# Patient Record
Sex: MALE | Race: OTHER | Employment: OTHER | URBAN - METROPOLITAN AREA
[De-identification: names, ages, dates, MRNs, and addresses within clinical notes are randomized per-mention and may not be internally consistent; named-entity substitution may affect disease eponyms.]

---

## 2017-01-15 RX ORDER — AMPICILLIN TRIHYDRATE 500 MG
CAPSULE ORAL
Qty: 30 CAP | Refills: 5 | Status: SHIPPED | OUTPATIENT
Start: 2017-01-15 | End: 2017-07-28 | Stop reason: SDUPTHER

## 2017-02-13 ENCOUNTER — OFFICE VISIT (OUTPATIENT)
Dept: INTERNAL MEDICINE CLINIC | Age: 77
End: 2017-02-13

## 2017-02-13 VITALS
HEIGHT: 64 IN | OXYGEN SATURATION: 97 % | SYSTOLIC BLOOD PRESSURE: 155 MMHG | RESPIRATION RATE: 20 BRPM | HEART RATE: 74 BPM | TEMPERATURE: 96.8 F | BODY MASS INDEX: 26.43 KG/M2 | WEIGHT: 154.8 LBS | DIASTOLIC BLOOD PRESSURE: 68 MMHG

## 2017-02-13 DIAGNOSIS — G57.90 NEUROPATHY OF FOOT, UNSPECIFIED LATERALITY: Primary | ICD-10-CM

## 2017-02-13 DIAGNOSIS — E11.9 TYPE 2 DIABETES MELLITUS WITHOUT COMPLICATION, WITHOUT LONG-TERM CURRENT USE OF INSULIN (HCC): ICD-10-CM

## 2017-02-13 DIAGNOSIS — E78.00 PURE HYPERCHOLESTEROLEMIA: ICD-10-CM

## 2017-02-13 DIAGNOSIS — I10 ESSENTIAL HYPERTENSION: ICD-10-CM

## 2017-02-13 NOTE — PROGRESS NOTES
Health Maintenance Due   Topic Date Due    DTaP/Tdap/Td series (1 - Tdap) 01/07/1960    ZOSTER VACCINE AGE 60>  01/07/1999    Pneumococcal 65+ Low/Medium Risk (2 of 2 - PCV13) 10/29/2015    EYE EXAM RETINAL OR DILATED Q1  02/14/2016    GLAUCOMA SCREENING Q2Y  02/14/2017       Chief Complaint   Patient presents with    Follow-up     4 month    Hypertension    Diabetes    Cholesterol Problem       1. Have you been to the ER, urgent care clinic since your last visit? Hospitalized since your last visit? No    2. Have you seen or consulted any other health care providers outside of the [address] [place] [address] since your last visit? Include any pap smears or colon screening. No    3) Do you have an Advance Directive on file? no    4) Are you interested in receiving information on Advance Directives? NO      Patient is accompanied by self I have received verbal consent from [facility] [place]  to discuss any/all medical information while they are present in the room.

## 2017-02-13 NOTE — MR AVS SNAPSHOT
Visit Information Date & Time Provider Department Dept. Phone Encounter #  
 2/13/2017 10:00 AM Nabeel Casillas, [address] [facility] Internal Medicine [id number] Upcoming Health Maintenance Date Due DTaP/Tdap/Td series (1 - Tdap) 1/7/1960 ZOSTER VACCINE AGE 60> 1/7/1999 Pneumococcal 65+ Low/Medium Risk (2 of 2 - PCV13) 10/29/2015 EYE EXAM RETINAL OR DILATED Q1 2/14/2016 GLAUCOMA SCREENING Q2Y 2/14/2017 HEMOGLOBIN A1C Q6M 4/10/2017 MEDICARE YEARLY EXAM 5/3/2017 MICROALBUMIN Q1 10/10/2017 LIPID PANEL Q1 10/10/2017 FOOT EXAM Q1 1/18/2018 Allergies as of 2/13/2017  Review Complete On: 2/13/2017 By: Nabeel Casillas MD  
  
 Severity Noted Reaction Type Reactions Amoxicillin  02/23/2012   Systemic Nausea and Vomiting Hydrocod-cpm-pe-acetaminophen  03/19/2010    Other (comments) GI Upset Seafood [Shellfish Containing Products]  04/20/2012   Topical Rash Current Immunizations  Reviewed on 11/20/2014 Name Date Influenza Vaccine 11/18/2014,  Incomplete Influenza Vaccine Split 11/2/2012, 9/27/2011, 10/14/2010 Pneumococcal Polysaccharide (PPSV-23) 10/29/2014 Not reviewed this visit You Were Diagnosed With   
  
 Codes Comments Neuropathy of foot, unspecified laterality    -  Primary ICD-10-CM: G57.90 ICD-9-CM: 355.8 Essential hypertension     ICD-10-CM: I10 
ICD-9-CM: 401.9 Type 2 diabetes mellitus without complication, without long-term current use of insulin (HCC)     ICD-10-CM: E11.9 ICD-9-CM: 250.00 Pure hypercholesterolemia     ICD-10-CM: E78.00 ICD-9-CM: 272.0 Vitals BP Pulse Temp Resp Height(growth percentile) Weight(growth percentile) 155/68 (BP 1 Location: Left arm, BP Patient Position: Sitting) 74 96.8 °F (36 °C) (Oral) 20 5' 4\" (1.626 m) 154 lb 12.8 oz (70.2 kg) SpO2 BMI Smoking Status 97% 26.57 kg/m2 Former Smoker Vitals History BMI and BSA Data Body Mass Index Body Surface Area  
 26.57 kg/m 2 1.78 m 2 Preferred Pharmacy Pharmacy Name Phone [id number] [phone] Your Updated Medication List  
  
   
This list is accurate as of: 2/13/17 10:22 AM.  Always use your most recent med list.  
  
  
  
  
 acetaminophen 500 mg tablet Commonly known as:  TYLENOL Take  by mouth every six (6) hours as needed. aspirin 81 mg chewable tablet Take 1 Tab by mouth every other day. clobetasol 0.05 % ointment Commonly known as:  Caryle Killer Apply  to affected area two (2) times a day. fluticasone 50 mcg/actuation nasal spray Commonly known as:  Cathlyn Bryon INHALE 1 SPRAY EACH NOSTRIL AT BEDTIME  
  
 glipiZIDE SR 2.5 mg CR tablet Commonly known as:  GLUCOTROL XL  
TAKE 1 TABLET BY MOUTH EVERY DAY  
  
 hydrocortisone 2.5 % lotion Commonly known as:  HYTONE  
APPLY A THIN LAYER TO AFFECTED AREA TWICE DAILY Iron 325 mg (65 mg iron) tablet Generic drug:  ferrous sulfate TAKE 1 TABLET BY MOUTH DAILY (BEFORE BREAKFAST). lisinopril 10 mg tablet Commonly known as:  PRINIVIL, ZESTRIL  
TAKE 1 TABLET BY MOUTH DAILY DISCONTINUE HCTZ  
  
 meloxicam 7.5 mg tablet Commonly known as:  MOBIC Take 1 Tab by mouth daily. OTHER  
500 mg. [facility] pain relief SENNA PLUS 8.6-50 mg per tablet Generic drug:  senna-docusate TAKE 1 TAB BY MOUTH DAILY AS NEEDED FOR CONSTIPATION. simvastatin 10 mg tablet Commonly known as:  ZOCOR  
TAKE 1 TABLET BY MOUTH AT BEDTIME  
  
 * JANUMET XR 50-1,000 mg Tm24 Generic drug:  SITagliptin-metFORMIN  
TAKE 2 TABLETS BY MOUTH EVERY DAY  
  
 * SITagliptin-metFORMIN 50-1,000 mg Tm24 Commonly known as:  JANUMET XR  
TAKE 2 TABLETS BY MOUTH EVERY DAY  
  
 SYSTANE (PROPYLENE GLYCOL) 0.4-0.3 % Drop Generic drug:  peg 400-propylene glycol Administer 1 Drop to left eye as needed. tamsulosin 0.4 mg capsule Commonly known as:  FLOMAX TAKE ONE CAPSULE BY MOUTH EVERY DAY  
  
 traMADol 50 mg tablet Commonly known as:  ULTRAM  
TAKE 1 TABLET BY MOUTH EVERY DAY AS NEEDED FOR PAIN  
  
 triamcinolone 55 mcg nasal inhaler Commonly known as:  NASACORT AQ  
1 [place] by Both Nostrils route nightly. VITAMIN D3 1,000 unit Cap Generic drug:  cholecalciferol TAKE ONE CAPSULE BY MOUTH EVERY DAY * VOLTAREN 1 % Gel Generic drug:  diclofenac APPLY 2 G TO AFFECTED AREA TWO (2) TIMES A DAY. * diclofenac 1 % Gel Commonly known as:  VOLTAREN Apply 2 g to affected area two (2) times a day. * Notice: This list has 4 medication(s) that are the same as other medications prescribed for you. Read the directions carefully, and ask your doctor or other care provider to review them with you. We Performed the Following HEMOGLOBIN A1C WITH EAG [49492 CPT(R)] METABOLIC PANEL, COMPREHENSIVE [26290 CPT(R)] Introducing \[facility]\"" & [facility] SERVICES! Dear Sudhir Valente: 
Thank you for requesting a [employer] account. Our records indicate that you have previously registered for a [employer] account but its currently inactive. Please call our [employer] support line at [phone]. Additional Information If you have questions, please visit the Frequently Asked Questions section of the [employer] website at [email or web address] Remember, [employer] is NOT to be used for urgent needs. For medical emergencies, dial 911. Now available from your iPhone and Android! Please provide this summary of care documentation to your next provider. Your primary care clinician is listed as Kristina Plascencia. If you have any questions after today's visit, please call [phone].

## 2017-02-13 NOTE — LETTER
2/15/2017 8:50 AM 
 
Mr. Stacey Ariza 
[address] [place] [address] [address] Dear Stacey Ariza: 
 
Please find your most recent results below. Resulted Orders METABOLIC PANEL, COMPREHENSIVE Result Value Ref Range Glucose 162 (H) 65 - 99 mg/dL BUN 16 8 - 27 mg/dL Creatinine 0.86 0.76 - 1.27 mg/dL GFR est non-AA 83 >59 mL/min/1.73 GFR est AA 96 >59 mL/min/1.73  
 BUN/Creatinine ratio 19 10 - 22 Sodium 144 134 - 144 mmol/L Potassium 4.1 3.5 - 5.2 mmol/L Chloride 101 96 - 106 mmol/L  
 CO2 28 18 - 29 mmol/L Calcium 9.4 8.6 - 10.2 mg/dL Protein, total 6.9 6.0 - 8.5 g/dL Albumin 4.3 3.5 - 4.8 g/dL GLOBULIN, TOTAL 2.6 1.5 - 4.5 g/dL A-G Ratio 1.7 1.1 - 2.5 Comment: **Effective March 13, 2017 the reference interval** 
  for A/G Ratio will be changing to: Age                Male          Female 0 -  7 days       1.1 - 2.3       1.1 - 2.3 
          8 - 30 days       1.2 - 2.8       1.2 - 2.8 
          1 -  6 months     1.3 - 3.6       1.3 - 3.6 
   7 months -  5 years      1.5 - 2.6       1.5 - 2.6 
             > 5 years      1.2 - 2.2       1.2 - 2.2 Bilirubin, total 0.3 0.0 - 1.2 mg/dL Alk. phosphatase 55 39 - 117 IU/L  
 AST (SGOT) 20 0 - 40 IU/L  
 ALT (SGPT) 16 0 - 44 IU/L Narrative Performed at:  [facility] [address]  [id number] : Lokesh Shelley MD, Phone:  [phone] HEMOGLOBIN A1C WITH EAG Result Value Ref Range Hemoglobin A1c 7.9 (H) 4.8 - 5.6 % Comment:  
            Pre-diabetes: 5.7 - 6.4 Diabetes: >6.4 Glycemic control for adults with diabetes: <7.0 Estimated average glucose 180 mg/dL Narrative Performed at:  [address]  [id number] : Lokesh Shelley MD, Phone:  [phone] RECOMMENDATIONS: 
 [address] your labs indicate that your diabetes is better, all other labs are stable. Please call me if you have any questions: [phone] Sincerely, Elle Camacho MD

## 2017-02-13 NOTE — PROGRESS NOTES
HISTORY OF PRESENT ILLNESS  Adrian Liriano is a 65 y.o. male here for follow up. Reports foot pain all the time. he is diabetic.askign to have special diabetic shoe. He is seen for diabetes. Bernarda High He reports medication compliance: compliant all of the time. Diabetic diet compliance: compliant most of the time. Home glucose monitoring: is performed sporadically, values are usually normal. Further diabetic ROS: no polyuria or polydipsia, no chest pain, dyspnea or TIA's, no numbness, tingling or pain in extremities, no hypoglycemia. Lab review: labs are reviewed, up to date and normal, labs reviewed and discussed with patient. Eye exam: up to date. Reports pain in lower back. on voltarin gel. liloderm patch do not cover. Has HTN,BP is stable. Follow-up   Pertinent negatives include no chest pain. Hypertension    Pertinent negatives include no chest pain, no palpitations and no blurred vision. Diabetes   Pertinent negatives include no chest pain. Cholesterol Problem   Pertinent negatives include no chest pain. Pertinent negatives include no chest pain. Review of Systems   Constitutional: Negative. Negative for chills and fever. HENT: Negative. Eyes: Negative. Negative for blurred vision and double vision. Respiratory: Negative. Cardiovascular: Negative. Negative for chest pain and palpitations. Gastrointestinal: Negative. Genitourinary: Negative. Musculoskeletal: Positive for joint pain. Skin: Negative. Neurological: Negative. Psychiatric/Behavioral: Negative. Physical Exam   Constitutional: He appears well-developed and well-nourished. No distress. Neck: Normal range of motion. Neck supple. No JVD present. No thyromegaly present. Cardiovascular: Normal rate, regular rhythm, normal heart sounds and intact distal pulses. Pulmonary/Chest: Effort normal and breath sounds normal. No respiratory distress. He has no wheezes. He has no rales. Musculoskeletal: He exhibits tenderness. He exhibits no edema. Diabetic foot exam:     Left: Reflexes 2+     Vibratory sensation diminished    Proprioception normal   Sharp/dull discrimination diminished    Filament test reduced sensation with micro filament   Pulse DP: 2+ (normal)   Pulse PT: 2+ (normal)   Deformities: None  Right: Reflexes 2+   Vibratory sensation diminished   Proprioception diminished   Sharp/dull discrimination normal   Filament test reduced sensation with micro filament   Pulse DP: 2+ (normal)   Pulse PT: 2+ (normal)   Deformities: None   Skin: Rash noted. Small discoid sandpipper lesion in arms and legs. itchy   Psychiatric: He has a normal mood and affect. His behavior is normal.       ASSESSMENT and PLAN    Isabelle Padilla was seen today for follow-up, hypertension, diabetes and cholesterol problem. Diagnoses and all orders for this visit:    Neuropathy of foot, unspecified laterality    Need to use diabetic shoe. Will fill out papers. Essential hypertension    Stable. will do,  -     METABOLIC PANEL, COMPREHENSIVE    Type 2 diabetes mellitus without complication, without long-term current use of insulin (HCC)    On janumet XR. Will do,  -     METABOLIC PANEL, COMPREHENSIVE  -     HEMOGLOBIN A1C WITH EAG    Pure hypercholesterolemia    On lofibra. Issues reviewed with her: referral to Diabetic Education department, diabetic diet discussed in detail, written exchange diet given, home glucose monitoring emphasized, all medications, side effects and compliance discussed carefully, foot care discussed and Podiatry visits discussed, annual eye examinations at Ophthalmology discussed, long term diabetic complications discussed and labs immediately prior to next visit.

## 2017-02-14 DIAGNOSIS — L30.9 DERMATITIS: ICD-10-CM

## 2017-02-14 LAB
ALBUMIN SERPL-MCNC: 4.3 G/DL (ref 3.5–4.8)
ALBUMIN/GLOB SERPL: 1.7 {RATIO} (ref 1.1–2.5)
ALP SERPL-CCNC: 55 IU/L (ref 39–117)
ALT SERPL-CCNC: 16 IU/L (ref 0–44)
AST SERPL-CCNC: 20 IU/L (ref 0–40)
BILIRUB SERPL-MCNC: 0.3 MG/DL (ref 0–1.2)
BUN SERPL-MCNC: 16 MG/DL (ref 8–27)
BUN/CREAT SERPL: 19 (ref 10–22)
CALCIUM SERPL-MCNC: 9.4 MG/DL (ref 8.6–10.2)
CHLORIDE SERPL-SCNC: 101 MMOL/L (ref 96–106)
CO2 SERPL-SCNC: 28 MMOL/L (ref 18–29)
CREAT SERPL-MCNC: 0.86 MG/DL (ref 0.76–1.27)
EST. AVERAGE GLUCOSE BLD GHB EST-MCNC: 180 MG/DL
GLOBULIN SER CALC-MCNC: 2.6 G/DL (ref 1.5–4.5)
GLUCOSE SERPL-MCNC: 162 MG/DL (ref 65–99)
HBA1C MFR BLD: 7.9 % (ref 4.8–5.6)
POTASSIUM SERPL-SCNC: 4.1 MMOL/L (ref 3.5–5.2)
PROT SERPL-MCNC: 6.9 G/DL (ref 6–8.5)
SODIUM SERPL-SCNC: 144 MMOL/L (ref 134–144)

## 2017-02-14 RX ORDER — CLOBETASOL PROPIONATE 0.5 MG/G
OINTMENT TOPICAL
Qty: 15 G | Refills: 0 | Status: SHIPPED | OUTPATIENT
Start: 2017-02-14 | End: 2017-04-12

## 2017-02-21 ENCOUNTER — TELEPHONE (OUTPATIENT)
Dept: INTERNAL MEDICINE CLINIC | Age: 77
End: 2017-02-21

## 2017-02-21 NOTE — TELEPHONE ENCOUNTER
Something is missing on the forms please call them and discuss. Its something about the visit has to be on the same month that they are ordered  Please call and discuss.  The daughterr called and did not know the speciifics

## 2017-02-22 NOTE — TELEPHONE ENCOUNTER
All forms sent and writer verified with [facility] that they have everything they need

## 2017-02-22 NOTE — TELEPHONE ENCOUNTER
----- Message from Urban Bennett sent at 2/21/2017 10:46 AM EST -----  Regarding: /Telephone   The [facility] C)[phone] (S)[phone], needs a physicians's note sent over for pt's diabetic shoe. Pt needs this sent as soon as possible. Please call the [facility] for more detail.  The best number for pt's son is  [phone] (Jv Hernandez)

## 2017-03-13 RX ORDER — TAMSULOSIN HYDROCHLORIDE 0.4 MG/1
CAPSULE ORAL
Qty: 90 CAP | Refills: 1 | Status: SHIPPED | OUTPATIENT
Start: 2017-03-13 | End: 2017-09-20 | Stop reason: SDUPTHER

## 2017-03-27 DIAGNOSIS — M15.9 PRIMARY OSTEOARTHRITIS INVOLVING MULTIPLE JOINTS: ICD-10-CM

## 2017-03-27 RX ORDER — DICLOFENAC SODIUM 10 MG/G
GEL TOPICAL
Qty: 100 G | Refills: 3 | Status: SHIPPED | OUTPATIENT
Start: 2017-03-27 | End: 2017-08-23 | Stop reason: SDUPTHER

## 2017-03-29 DIAGNOSIS — K59.00 CONSTIPATION, UNSPECIFIED CONSTIPATION TYPE: ICD-10-CM

## 2017-03-29 RX ORDER — AMOXICILLIN 250 MG
1 CAPSULE ORAL DAILY
Qty: 30 TAB | Refills: 3 | Status: SHIPPED | OUTPATIENT
Start: 2017-03-29 | End: 2017-07-28 | Stop reason: SDUPTHER

## 2017-04-12 ENCOUNTER — OFFICE VISIT (OUTPATIENT)
Dept: INTERNAL MEDICINE CLINIC | Age: 77
End: 2017-04-12

## 2017-04-12 VITALS
HEART RATE: 89 BPM | HEIGHT: 64 IN | TEMPERATURE: 97.9 F | DIASTOLIC BLOOD PRESSURE: 75 MMHG | SYSTOLIC BLOOD PRESSURE: 154 MMHG | OXYGEN SATURATION: 96 % | RESPIRATION RATE: 20 BRPM | BODY MASS INDEX: 25.27 KG/M2 | WEIGHT: 148 LBS

## 2017-04-12 DIAGNOSIS — R42 DIZZINESS: ICD-10-CM
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
## 2017-04-12 DIAGNOSIS — T14.8XXA HEMATOMA: Primary | ICD-10-CM

## 2017-04-12 NOTE — LETTER
4/25/2017 4:03 PM 
 
Mr. Ciara [id number] Dear Joe Lima: 
 
Please find your most recent results below. Resulted Orders CBC WITH AUTOMATED DIFF Result Value Ref Range WBC 5.9 3.4 - 10.8 x10E3/uL  
 RBC 4.81 4.14 - 5.80 x10E6/uL HGB 13.6 12.6 - 17.7 g/dL HCT 42.5 37.5 - 51.0 % MCV 88 79 - 97 fL  
 MCH 28.3 26.6 - 33.0 pg  
 MCHC 32.0 31.5 - 35.7 g/dL  
 RDW 14.0 12.3 - 15.4 % PLATELET 221 355 - 301 x10E3/uL NEUTROPHILS 61 % Lymphocytes 29 % MONOCYTES 7 % EOSINOPHILS 3 % BASOPHILS 0 %  
 ABS. NEUTROPHILS 3.6 1.4 - 7.0 x10E3/uL Abs Lymphocytes 1.7 0.7 - 3.1 x10E3/uL  
 ABS. MONOCYTES 0.4 0.1 - 0.9 x10E3/uL  
 ABS. EOSINOPHILS 0.2 0.0 - 0.4 x10E3/uL  
 ABS. BASOPHILS 0.0 0.0 - 0.2 x10E3/uL IMMATURE GRANULOCYTES 0 %  
 ABS. IMM. GRANS. 0.0 0.0 - 0.1 x10E3/uL Narrative Performed at:  [address]  [id number] : Erendira Mcgrath MD, Phone:  [phone] METABOLIC PANEL, COMPREHENSIVE Result Value Ref Range Glucose 196 (H) 65 - 99 mg/dL BUN 19 8 - 27 mg/dL Creatinine 0.90 0.76 - 1.27 mg/dL GFR est non-AA 82 >59 mL/min/1.73 GFR est AA 94 >59 mL/min/1.73  
 BUN/Creatinine ratio 21 10 - 24 Sodium 141 134 - 144 mmol/L Potassium 4.3 3.5 - 5.2 mmol/L Chloride 102 96 - 106 mmol/L  
 CO2 28 18 - 29 mmol/L Calcium 9.1 8.6 - 10.2 mg/dL Protein, total 6.7 6.0 - 8.5 g/dL Albumin 3.8 3.5 - 4.8 g/dL GLOBULIN, TOTAL 2.9 1.5 - 4.5 g/dL A-G Ratio 1.3 1.2 - 2.2 Bilirubin, total 0.2 0.0 - 1.2 mg/dL Alk. phosphatase 55 39 - 117 IU/L  
 AST (SGOT) 23 0 - 40 IU/L  
 ALT (SGPT) 14 0 - 44 IU/L Narrative Performed at:  [address]  [id number] : Erendira Mcgrath MD, Phone:  [phone] TSH 3RD GENERATION Result Value Ref Range TSH 0.253 (L) 0.450 - 4.500 uIU/mL Narrative Performed at:  [address]  [id number] : Claire Raya MD, Phone:  [phone] PROTHROMBIN TIME + INR Result Value Ref Range INR 1.0 0.8 - 1.2 Comment:  
   Reference interval is for non-anticoagulated patients. Suggested INR therapeutic range for Vitamin K 
antagonist therapy: 
   Standard Dose (moderate intensity 
                  therapeutic range):       2.0 - 3.0 Higher intensity therapeutic range       2.5 - 3.5 Prothrombin time 10.8 9.1 - 12.0 sec Narrative Performed at:  [address]  [id number] : Claire Raya MD, Phone:  [phone] DIABETES PATIENT EDUCATION Result Value Ref Range PDF Image WILL FOLLOW Narrative Performed at:  [facility] [place] [address] [place] [address]  [id number] : Pebbles Angel PhD, Phone:  [phone] DIABETES PATIENT EDUCATION Result Value Ref Range PDF Image Not applicable Narrative Performed at:  [facility] [place] [address] [place] [address]  [id number] : Pebbles Angel PhD, Phone:  [phone] HEMOGLOBIN A1C W/O EAG Result Value Ref Range Hemoglobin A1c 8.4 (H) 4.8 - 5.6 % Comment:  
            Pre-diabetes: 5.7 - 6.4 Diabetes: >6.4 Glycemic control for adults with diabetes: <7.0 Narrative Performed at:  [address]  [id number] : Claire Raya MD, Phone:  [phone] T4, FREE Result Value Ref Range T4, Free 1.10 0.82 - 1.77 ng/dL Narrative Performed at:  [address]  [id number] : Claire Raya MD, Phone:  [phone] SPECIMEN STATUS REPORT Result Value Ref Range SPECIMEN STATUS REPORT COMMENT Comment:  
   Written Authorization Written Authorization Written Authorization Received. Authorization received from [address] [place] [address] 04- Logged by Charity Kirkland Narrative Performed at:  [address] [place] [address]  [id number] : Amarjit Batista PhD, Phone:  [phone] RECOMMENDATIONS: 
My nurse left a message on your phone: 
HgbA1c is more elevated to 8.4, from 7.9 two months ago. Please watch diet for sugars and carbohydrates.  Continue Janumet as ordered. Follow-up with Dr. John Berkowitz in 3 months for re-check Please call me if you have any questions: [phone] Sincerely, Hernando Zhang NP

## 2017-04-12 NOTE — PROGRESS NOTES
Chief Complaint   Patient presents with    Fall     fell (10 days ago) trying to move table--stumbling some now    Bleeding/Bruising     check right arm     Reviewed record  In preparation for visit and have obtained necessary documentation. 1. Have you been to the ER, urgent care clinic since your last visit? Hospitalized since your last visit?  no    2. Have you seen or consulted any other health care providers outside of the [address] [place] [address] since your last visit? Include any pap smears or colon screening. No    Used 2 patient I. D. 's  Patient is here today with daughter, Melody Johnson, she works at [employer], though, lives with his son.

## 2017-04-12 NOTE — PATIENT INSTRUCTIONS
Hematoma: Care Instructions  Your Care Instructions  A hematoma is a bad bruise. It happens when an injury causes blood to collect and pool under the skin. The pooling blood gives the skin a spongy, rubbery, lumpy feel. A hematoma usually is not a cause for concern. It is not the same thing as a blood clot in a vein, and it does not cause blood clots. Follow-up care is a key part of your treatment and safety. Be sure to make and go to all appointments, and call your doctor if you are having problems. It's also a good idea to know your test results and keep a list of the medicines you take. How can you care for yourself at home? · Rest and protect the bruised area. · Put ice or a cold pack on the area for 10 to 20 minutes at a time. · Prop up the bruised area on a pillow when you ice it or anytime you sit or lie down during the next 3 days. Try to keep it above the level of your heart. This will help reduce swelling. · Wrapping the bruised area with an elastic bandage such as an Ace wrap will help decrease swelling. Don't wrap it too tightly, as this can cause more swelling below the affected area. · Take an over-the-counter pain medicine, such as acetaminophen (Tylenol), ibuprofen (Advil, Motrin), or naproxen (Aleve). · Do not take two or more pain medicines at the same time unless the doctor told you to. Many pain medicines have acetaminophen, which is Tylenol. Too much acetaminophen (Tylenol) can be harmful. When should you call for help? Call your doctor now or seek immediate medical care if:  · You have signs of skin infection, such as:  ¨ Increased pain, swelling, warmth, or redness. ¨ Red streaks leading from the area. ¨ Pus draining from the area. ¨ A fever. Watch closely for changes in your health, and be sure to contact your doctor if:  · The bruise lasts longer than 4 weeks. · The bruise gets bigger or becomes more painful. · You do not get better as expected.   Where can you learn more?  Go to http://aliyah-leigh.info/. Enter P911 in the search box to learn more about \"Hematoma: Care Instructions. \"  Current as of: May 27, 2016  Content Version: 11.2  © 3258-2381 [employer]. Care instructions adapted under license by [employer] (which disclaims liability or warranty for this information). If you have questions about a medical condition or this instruction, always ask your healthcare professional. [address] any warranty or liability for your use of this information.

## 2017-04-12 NOTE — PROGRESS NOTES
HISTORY OF PRESENT ILLNESS  Lissa Castleman is a 65 y.o. male. This is a patient of Dr. Ezequiel Riedel who presents today with concerns about bruising. The patient apparently suffered a fall after tripping about 10 days ago. He noticed a bruised and swollen area to his right arm about ten days ago. Area is tender; however, ROM of right arm is intact. Patient denies other signs of bleeding/ bruising. The patient takes ASA 81 mg daily. The patient denies chest pain, shortness of breath, and GI/ problems. He does have some complaints of intermittent dizziness over the last several months-- none currently. Patient did not hit head in fall. Patient states he did not fall due to dizziness, but that he tripped. Visit Vitals    /75    Pulse 89    Temp 97.9 °F (36.6 °C) (Oral)    Resp 20    Ht 5' 4\" (1.626 m)    Wt 148 lb (67.1 kg)    SpO2 96%    BMI 25.4 kg/m2     HPI    Review of Systems   Constitutional: Negative for chills, fever and malaise/fatigue. HENT: Negative for congestion. Eyes: Negative for blurred vision. Respiratory: Negative for cough and shortness of breath. Cardiovascular: Negative for chest pain, palpitations and leg swelling. Gastrointestinal: Negative for abdominal pain and blood in stool. Genitourinary: Negative. Musculoskeletal: Positive for falls. Skin: Negative. Bruising right arm   Neurological: Positive for dizziness. Negative for tingling, tremors, sensory change, speech change, focal weakness, weakness and headaches. Endo/Heme/Allergies: Negative. Psychiatric/Behavioral: Negative. Physical Exam   Constitutional: He is oriented to person, place, and time. He appears well-developed and well-nourished. No distress. HENT:   Head: Normocephalic and atraumatic. Eyes: Conjunctivae are normal.   Neck: Neck supple. Cardiovascular: Normal rate, regular rhythm, normal heart sounds and intact distal pulses. No murmur heard.   Pulmonary/Chest: Effort normal and breath sounds normal. No respiratory distress. He has no wheezes. He has no rales. Abdominal: Soft. He exhibits no distension. Musculoskeletal: He exhibits no edema. Neurological: He is alert and oriented to person, place, and time. Skin: Skin is warm and dry. Bruising noted to right upper arm without drainage or surrounding erythema   Psychiatric: He has a normal mood and affect. Nursing note and vitals reviewed. ASSESSMENT and PLAN    ICD-10-CM ICD-9-CM    1. Hematoma T14.8 924.9 Hematoma should slowly heal over the next several weeks. May apply warm compress for comfort. Will order  CBC WITH AUTOMATED DIFF      METABOLIC PANEL, COMPREHENSIVE      TSH 3RD GENERATION      PROTHROMBIN TIME + INR   2. Fall, initial encounter [address] Patricio Stack K132.5 Fall precautions. 3. Dizziness R42 780.4 Lab work as above. Lab results and schedule of future lab studies reviewed with patient  Reviewed diet, exercise and weight control  Reviewed medications and side effects in detail  Patient encouraged to call or return to office if symptoms do not improve or worsen. Reviewed plan of care with patient who acknowledges understanding and agrees.

## 2017-04-13 LAB — Lab: NORMAL

## 2017-04-13 NOTE — PROGRESS NOTES
Blood sugar elevated at time of labs. Please add HgbA1c if able. TSH mildly low. Please add free T4 if able.   Otherwise, stable labs

## 2017-04-18 LAB
ALBUMIN SERPL-MCNC: 3.8 G/DL (ref 3.5–4.8)
ALBUMIN/GLOB SERPL: 1.3 {RATIO} (ref 1.2–2.2)
ALP SERPL-CCNC: 55 IU/L (ref 39–117)
ALT SERPL-CCNC: 14 IU/L (ref 0–44)
AST SERPL-CCNC: 23 IU/L (ref 0–40)
BASOPHILS # BLD AUTO: 0 X10E3/UL (ref 0–0.2)
BASOPHILS NFR BLD AUTO: 0 %
BILIRUB SERPL-MCNC: 0.2 MG/DL (ref 0–1.2)
BUN SERPL-MCNC: 19 MG/DL (ref 8–27)
BUN/CREAT SERPL: 21 (ref 10–24)
CALCIUM SERPL-MCNC: 9.1 MG/DL (ref 8.6–10.2)
CHLORIDE SERPL-SCNC: 102 MMOL/L (ref 96–106)
CO2 SERPL-SCNC: 28 MMOL/L (ref 18–29)
CREAT SERPL-MCNC: 0.9 MG/DL (ref 0.76–1.27)
EOSINOPHIL # BLD AUTO: 0.2 X10E3/UL (ref 0–0.4)
EOSINOPHIL NFR BLD AUTO: 3 %
ERYTHROCYTE [DISTWIDTH] IN BLOOD BY AUTOMATED COUNT: 14 % (ref 12.3–15.4)
GLOBULIN SER CALC-MCNC: 2.9 G/DL (ref 1.5–4.5)
GLUCOSE SERPL-MCNC: 196 MG/DL (ref 65–99)
HBA1C MFR BLD: 8.4 % (ref 4.8–5.6)
HCT VFR BLD AUTO: 42.5 % (ref 37.5–51)
HGB BLD-MCNC: 13.6 G/DL (ref 12.6–17.7)
IMM GRANULOCYTES # BLD: 0 X10E3/UL (ref 0–0.1)
IMM GRANULOCYTES NFR BLD: 0 %
INR PPP: 1 (ref 0.8–1.2)
LYMPHOCYTES # BLD AUTO: 1.7 X10E3/UL (ref 0.7–3.1)
LYMPHOCYTES NFR BLD AUTO: 29 %
Lab: NORMAL
MCH RBC QN AUTO: 28.3 PG (ref 26.6–33)
MCHC RBC AUTO-ENTMCNC: 32 G/DL (ref 31.5–35.7)
MCV RBC AUTO: 88 FL (ref 79–97)
MONOCYTES # BLD AUTO: 0.4 X10E3/UL (ref 0.1–0.9)
MONOCYTES NFR BLD AUTO: 7 %
NEUTROPHILS # BLD AUTO: 3.6 X10E3/UL (ref 1.4–7)
NEUTROPHILS NFR BLD AUTO: 61 %
PLATELET # BLD AUTO: 267 X10E3/UL (ref 150–379)
POTASSIUM SERPL-SCNC: 4.3 MMOL/L (ref 3.5–5.2)
PROT SERPL-MCNC: 6.7 G/DL (ref 6–8.5)
PROTHROMBIN TIME: 10.8 SEC (ref 9.1–12)
RBC # BLD AUTO: 4.81 X10E6/UL (ref 4.14–5.8)
SODIUM SERPL-SCNC: 141 MMOL/L (ref 134–144)
SPECIMEN STATUS REPORT, ROLRST: NORMAL
T4 FREE SERPL-MCNC: 1.1 NG/DL (ref 0.82–1.77)
TSH SERPL DL<=0.005 MIU/L-ACNC: 0.25 UIU/ML (ref 0.45–4.5)
WBC # BLD AUTO: 5.9 X10E3/UL (ref 3.4–10.8)

## 2017-04-18 NOTE — PROGRESS NOTES
HgbA1c is more elevated to 8.4, from 7.9 two months ago. Please watch diet for sugars and carbohydrates. Continue Janumet as ordered.  Follow-up with Dr. Robbie Abdullahi in 3 months for re-check

## 2017-04-20 DIAGNOSIS — L30.9 DERMATITIS: ICD-10-CM

## 2017-04-20 RX ORDER — CLOBETASOL PROPIONATE 0.5 MG/G
OINTMENT TOPICAL
Qty: 15 G | Refills: 0 | Status: SHIPPED | OUTPATIENT
Start: 2017-04-20 | End: 2017-06-12

## 2017-04-25 NOTE — PROGRESS NOTES
Left message patient's daughter's phone (Gilbert Christensen) about lab results blood sugar getting higher. Letter mailed to patient with lab results and recommendations from provider.

## 2017-05-25 RX ORDER — SITAGLIPTIN AND METFORMIN HYDROCHLORIDE 50; 1000 MG/1; MG/1
TABLET, FILM COATED, EXTENDED RELEASE ORAL
Qty: 180 TAB | Refills: 0 | Status: SHIPPED | OUTPATIENT
Start: 2017-05-25 | End: 2017-09-11 | Stop reason: SDUPTHER

## 2017-06-01 RX ORDER — FLUTICASONE PROPIONATE 50 MCG
SPRAY, SUSPENSION (ML) NASAL
Qty: 1 BOTTLE | Refills: 1 | Status: SHIPPED | OUTPATIENT
Start: 2017-06-01 | End: 2018-04-09 | Stop reason: SDUPTHER

## 2017-06-12 ENCOUNTER — OFFICE VISIT (OUTPATIENT)
Dept: INTERNAL MEDICINE CLINIC | Age: 77
End: 2017-06-12

## 2017-06-12 VITALS
HEIGHT: 64 IN | DIASTOLIC BLOOD PRESSURE: 74 MMHG | BODY MASS INDEX: 25.44 KG/M2 | TEMPERATURE: 98.4 F | HEART RATE: 81 BPM | RESPIRATION RATE: 20 BRPM | SYSTOLIC BLOOD PRESSURE: 153 MMHG | OXYGEN SATURATION: 96 % | WEIGHT: 149 LBS

## 2017-06-12 DIAGNOSIS — I10 ESSENTIAL HYPERTENSION: ICD-10-CM

## 2017-06-12 DIAGNOSIS — M17.0 PRIMARY OSTEOARTHRITIS OF BOTH KNEES: ICD-10-CM

## 2017-06-12 DIAGNOSIS — J06.9 URTI (ACUTE UPPER RESPIRATORY INFECTION): Primary | ICD-10-CM

## 2017-06-12 DIAGNOSIS — L30.9 DERMATITIS: ICD-10-CM

## 2017-06-12 DIAGNOSIS — E78.00 PURE HYPERCHOLESTEROLEMIA: ICD-10-CM

## 2017-06-12 DIAGNOSIS — E11.9 TYPE 2 DIABETES MELLITUS WITHOUT COMPLICATION, WITHOUT LONG-TERM CURRENT USE OF INSULIN (HCC): ICD-10-CM

## 2017-06-12 DIAGNOSIS — M15.9 PRIMARY OSTEOARTHRITIS INVOLVING MULTIPLE JOINTS: ICD-10-CM

## 2017-06-12 DIAGNOSIS — Z00.00 MEDICARE ANNUAL WELLNESS VISIT, SUBSEQUENT: ICD-10-CM

## 2017-06-12 DIAGNOSIS — H91.90 HEARING LOSS, UNSPECIFIED HEARING LOSS TYPE, UNSPECIFIED LATERALITY: ICD-10-CM

## 2017-06-12 RX ORDER — LISINOPRIL 10 MG/1
TABLET ORAL
Qty: 30 TAB | Refills: 4 | Status: SHIPPED | OUTPATIENT
Start: 2017-06-12 | End: 2017-11-08 | Stop reason: SDUPTHER

## 2017-06-12 RX ORDER — CLOBETASOL PROPIONATE 0.5 MG/G
OINTMENT TOPICAL
Qty: 15 G | Refills: 0 | Status: SHIPPED | OUTPATIENT
Start: 2017-06-12 | End: 2017-07-17 | Stop reason: SDUPTHER

## 2017-06-12 RX ORDER — SIMVASTATIN 10 MG/1
10 TABLET, FILM COATED ORAL
Qty: 30 TAB | Refills: 6 | Status: SHIPPED | OUTPATIENT
Start: 2017-06-12 | End: 2017-11-06 | Stop reason: SDUPTHER

## 2017-06-12 RX ORDER — HYDROCODONE POLISTIREX AND CHLORPHENIRAMINE POLISTIREX 10; 8 MG/5ML; MG/5ML
1 SUSPENSION, EXTENDED RELEASE ORAL
Qty: 100 ML | Refills: 0 | Status: SHIPPED | OUTPATIENT
Start: 2017-06-12 | End: 2017-09-05

## 2017-06-12 RX ORDER — MELOXICAM 7.5 MG/1
7.5 TABLET ORAL DAILY
Qty: 30 TAB | Refills: 5 | Status: SHIPPED | OUTPATIENT
Start: 2017-06-12 | End: 2017-09-11 | Stop reason: DRUGHIGH

## 2017-06-12 RX ORDER — AZITHROMYCIN 250 MG/1
TABLET, FILM COATED ORAL
Qty: 6 TAB | Refills: 0 | Status: SHIPPED | OUTPATIENT
Start: 2017-06-12 | End: 2017-09-05

## 2017-06-12 RX ORDER — TRAMADOL HYDROCHLORIDE 50 MG/1
50 TABLET ORAL
Qty: 30 TAB | Refills: 0 | Status: SHIPPED | OUTPATIENT
Start: 2017-06-12 | End: 2017-09-05

## 2017-06-12 NOTE — LETTER
6/19/2017 8:34 AM 
 
Mr. Alonso Mast 
[address] [place] [id number] Dear Alonso Mast: 
 
Please find your most recent results below. Resulted Orders METABOLIC PANEL, COMPREHENSIVE Result Value Ref Range Glucose 141 (H) 65 - 99 mg/dL BUN 13 8 - 27 mg/dL Creatinine 0.85 0.76 - 1.27 mg/dL GFR est non-AA 83 >59 mL/min/1.73 GFR est AA 96 >59 mL/min/1.73  
 BUN/Creatinine ratio 15 10 - 24 Sodium 142 134 - 144 mmol/L Potassium 4.1 3.5 - 5.2 mmol/L Chloride 100 96 - 106 mmol/L  
 CO2 25 18 - 29 mmol/L Calcium 9.1 8.6 - 10.2 mg/dL Protein, total 6.7 6.0 - 8.5 g/dL Albumin 4.0 3.5 - 4.8 g/dL GLOBULIN, TOTAL 2.7 1.5 - 4.5 g/dL A-G Ratio 1.5 1.2 - 2.2 Bilirubin, total 0.3 0.0 - 1.2 mg/dL Alk. phosphatase 63 39 - 117 IU/L  
 AST (SGOT) 17 0 - 40 IU/L  
 ALT (SGPT) 16 0 - 44 IU/L Narrative Performed at:  [address]  [id number] : Renu Blood MD, Phone:  [phone] HEMOGLOBIN A1C WITH EAG Result Value Ref Range Hemoglobin A1c 8.4 (H) 4.8 - 5.6 % Comment:  
            Pre-diabetes: 5.7 - 6.4 Diabetes: >6.4 Glycemic control for adults with diabetes: <7.0 Estimated average glucose 194 mg/dL Narrative Performed at:  [address]  [id number] : Renu Blood MD, Phone:  [phone] RECOMMENDATIONS: 
Alonso Mast your labs indicate that overall your labs are stable but your diabetes is still uncontrolled, please be on a diabetic diet and exercise as tolerated. Please call me if you have any questions: [phone] Sincerely, Dea Espinoza MD

## 2017-06-12 NOTE — PROGRESS NOTES
HISTORY OF PRESENT ILLNESS  Darryl Araya is a 65 y.o. male here for follow up. Reports foot pain all the time. he is diabetic.asking to have special diabetic shoe. He is seen for diabetes. Annamaria Ayala He reports medication compliance: compliant all of the time. Diabetic diet compliance: compliant most of the time. Home glucose monitoring: is performed sporadically, values are usually normal. Further diabetic ROS: no polyuria or polydipsia, no chest pain, dyspnea or TIA's, no numbness, tingling or pain in extremities, no hypoglycemia. Lab review: labs are reviewed, up to date and normal, labs reviewed and discussed with patient. Eye exam: up to date. Reports pain in lower back. on voltarin gel. liloderm patch do not cover. Has HTN,BP is stable. Hypertension    Pertinent negatives include no chest pain, no palpitations and no blurred vision. Cholesterol Problem   Pertinent negatives include no chest pain. Diabetes   Pertinent negatives include no chest pain. Cold Symptoms   Pertinent negatives include no chest pain and no chills. Knee Pain   Pertinent negatives include no chest pain. Follow-up   Pertinent negatives include no chest pain. Review of Systems   Constitutional: Negative. Negative for chills and fever. HENT: Positive for congestion. Eyes: Negative. Negative for blurred vision and double vision. Respiratory: Positive for cough. Cardiovascular: Negative. Negative for chest pain and palpitations. Gastrointestinal: Negative. Genitourinary: Negative. Musculoskeletal: Positive for joint pain. Skin: Negative. Neurological: Negative. Psychiatric/Behavioral: Negative. Physical Exam   Constitutional: He appears well-developed and well-nourished. No distress. HENT:   Head: Normocephalic and atraumatic. Right Ear: External ear normal.   Left Ear: External ear normal.   Mouth/Throat: Oropharynx is clear and moist. No oropharyngeal exudate.    Nasal turbinates:red inflamed,NT    Cobble stoning present. Neck: Normal range of motion. Neck supple. No JVD present. No thyromegaly present. Cardiovascular: Normal rate, regular rhythm, normal heart sounds and intact distal pulses. Pulmonary/Chest: Effort normal and breath sounds normal. No respiratory distress. He has no wheezes. Musculoskeletal: He exhibits tenderness. He exhibits no edema. Knee;tender,ROm restricted in both knees   Skin: No rash noted. Psychiatric: He has a normal mood and affect. His behavior is normal.       ASSESSMENT and PLAN    Christopher Mathur was seen today for hypertension, cholesterol problem, diabetes, cold symptoms, knee pain and annual wellness visit. Diagnoses and all orders for this visit:    URTI (acute upper respiratory infection)    Rest and fluid. will call in,  -     azithromycin (ZITHROMAX Z-GRACY) 250 mg tablet; Take two tablets today then one tablet daily  -     chlorpheniramine-HYDROcodone (TUSSIONEX) 10-8 mg/5 mL suspension; Take 5 mL by mouth every twelve (12) hours as needed for Cough. Max Daily Amount: 10 mL. Type 2 diabetes mellitus without complication, without long-term current use of insulin ([address]    On janumet and glucotrol. will do,  -     METABOLIC PANEL, COMPREHENSIVE  -     HEMOGLOBIN A1C WITH EAG    Essential hypertension    On meds. will do,  -     METABOLIC PANEL, COMPREHENSIVE    Pure hypercholesterolemia    Will refill,  -     simvastatin (ZOCOR) 10 mg tablet; Take 1 Tab by mouth nightly. Hearing loss, unspecified hearing loss type, unspecified laterality    From wax. adv to use peroxide. Primary osteoarthritis of both knees    Will refer,  -     REFERRAL TO ORTHOPEDICS  -     traMADol (ULTRAM) 50 mg tablet; Take 1 Tab by mouth daily as needed for Pain. Primary osteoarthritis involving multiple joints  -     meloxicam (MOBIC) 7.5 mg tablet; Take 1 Tab by mouth daily.     Medicare annual wellness visit, subsequent          Issues reviewed with her: referral to Diabetic Education department, diabetic diet discussed in detail, written exchange diet given, home glucose monitoring emphasized, all medications, side effects and compliance discussed carefully, foot care discussed and Podiatry visits discussed, annual eye examinations at Ophthalmology discussed, long term diabetic complications discussed and labs immediately prior to next visit.

## 2017-06-12 NOTE — PROGRESS NOTES
Yanet Panda is a 65 y.o. male and presents for an Annual Medicare Wellness Visit by Luis Gottlieb    Assessment of cognitive impairment: Alert and oriented x 3. Depression Screen:   PHQ over the last two weeks 6/12/2017   Little interest or pleasure in doing things Not at all   Feeling down, depressed or hopeless Not at all   Total Score PHQ 2 0       Fall Risk Assessment:    Fall Risk Assessment, last 12 mths 6/12/2017   Able to walk? Yes   Fall in past 12 months? No   Fall with injury? -   Number of falls in past 12 months -   Fall Risk Score -       Abuse Screen:   Abuse Screening Questionnaire 6/12/2017   Do you ever feel afraid of your partner? (No Data)   Are you in a relationship with someone who physically or mentally threatens you? -   Is it safe for you to go home? -       Activities of Daily Living:  Partial assistance. Requires assistance with: dressing: requires assist with LE  DME: cane   Patient handle his/her own medications  yes Use of pill box  yes  Activities of Daily Living:   ADL Assessment 6/12/2017   Feeding yourself No Help Needed   Getting from bed to chair No Help Needed   Getting dressed No Help Needed   Bathing or showering No Help Needed   Walk across the room (includes cane/walker) No Help Needed   Using the telphone No Help Needed   Taking your medications No Help Needed   Preparing meals No Help Needed   Managing money (expenses/bills) Help Needed   Moderately strenuous housework (laundry) Help Needed   Shopping for personal items (toiletries/medicines) Help Needed   Shopping for groceries Help Needed   Driving Help Needed   Climbing a flight of stairs Help Needed   Getting to places beyond walking distances Help Needed       Health Maintenance:  Daily Aspirin: yes  Bone Density: not indicated. Glaucoma Screening: due. Son is aware. Goes to [facility]. Retinal Screening: due. Son is aware. Goes to [facility].   Immunizations: Tetanus: not indicated. Influenza: up to date for 2016 flu season. Shingles: not up to date - information to be sent to patient/family. PPSV-23: up to date. 10/29/14   Prevnar-13: not up to date - information will be sent to patient. Cancer screening:    Cervical: not indicated due to gender. Breast: not indicated due to gender. Colon: not indicated due to age. Prostate: not indicated due to age. Alcohol Risk Screen:   On any occasion during the past 3 months, have you had more than 3 drinks(female) or 4 drinks (male) containing alcohol in one? No  Do you average more than 7 drinks (female) or 14 drinks (male) per week? No  Type and amount: denies ETOH use    Hearing Loss: mild    Vision Loss:   Wears glasses, contact lenses, or have any other visual impairment  yes wears glasses full time    Adult Nutrition Screen: No risk factors noted. Advance Care Planning:    End of Life Planning: has NO advanced directive - not interested in additional information    Medications/Allergies: Reviewed with patient  Prior to Admission medications    Medication Sig Start Date End Date Taking? Authorizing Provider   lisinopril (PRINIVIL, ZESTRIL) 10 mg tablet TAKE 1 TABLET BY MOUTH DAILY DISCONTINUE HCTZ 6/12/17  Yes Sohan Rodriguez MD   azithromycin (ZITHROMAX Z-GRACY) 250 mg tablet Take two tablets today then one tablet daily 6/12/17  Yes Sohan Rodriguez MD   traMADol Carletha Nikhil) 50 mg tablet Take 1 Tab by mouth daily as needed for Pain. 6/12/17  Yes Sohan Rodriguez MD   simvastatin (ZOCOR) 10 mg tablet Take 1 Tab by mouth nightly. 6/12/17  Yes Sohan Rodriguez MD   meloxicam (MOBIC) 7.5 mg tablet Take 1 Tab by mouth daily. 6/12/17  Yes Sohan Rodriguez MD   chlorpheniramine-HYDROcodone (TUSSIONEX) 10-8 mg/5 mL suspension Take 5 mL by mouth every twelve (12) hours as needed for Cough.  Max Daily Amount: 10 mL. 6/12/17  Yes Sohan Rodriguez MD   fluticasone (FLONASE) 50 mcg/actuation nasal spray INHALE 1 SPRAY EACH NOSTRIL AT BEDTIME 6/1/17  Yes Keyonna Newton MD   JANUMET XR 50-1,000 mg TM24 TAKE 2 TABLETS BY MOUTH EVERY DAY 5/25/17  Yes Keyonna Newton MD   senna-docusate (SENNA PLUS) 8.6-50 mg per tablet Take 1 Tab by mouth daily. 3/29/17  Yes Keyonna Newton MD   diclofenac (VOLTAREN) 1 % gel APPLY 2 GRAMS TO AFFECTED AREA TWO (2) TIMES A DAY. 3/27/17  Yes Keyonna Newton MD   tamsulosin (FLOMAX) 0.4 mg capsule TAKE ONE CAPSULE BY MOUTH EVERY DAY 3/13/17  Yes Michell Kimball NP   VITAMIN D3 1,000 unit cap TAKE ONE CAPSULE BY MOUTH EVERY DAY 1/15/17  Yes Michell Kimball NP   clobetasol (TEMOVATE) 0.05 % ointment Apply  to affected area two (2) times a day. 10/10/16  Yes Keyonna Newton MD   glipiZIDE SR (GLUCOTROL) 2.5 mg CR tablet TAKE 1 TABLET BY MOUTH EVERY DAY 8/10/16  Yes Keyonna Newton MD   triamcinolone (NASACORT AQ) 55 mcg nasal inhaler 1 [place] by Both Nostrils route nightly. 6/20/16  Yes Keyonna Newton MD   hydrocortisone (HYTONE) 2.5 % lotion APPLY A THIN LAYER TO AFFECTED AREA TWICE DAILY 4/13/16  Yes Keyonna Newton MD   IRON 325 mg (65 mg iron) tablet TAKE 1 TABLET BY MOUTH DAILY (BEFORE BREAKFAST). 12/30/14  Yes Keyonna Newton MD   OTHER 500 mg. [facility] pain relief   Yes Historical Provider   peg 400-propylene glycol (SYSTANE) 0.4-0.3 % Drop Administer 1 Drop to left eye as needed. Yes Historical Provider   aspirin 81 mg chewable tablet Take 1 Tab by mouth every other day. 7/31/13  Yes Errol Frances MD   acetaminophen (TYLENOL) 500 mg tablet Take  by mouth every six (6) hours as needed.      Yes Historical Provider       Allergies   Allergen Reactions    Amoxicillin Nausea and Vomiting    Hydrocod-Cpm-Pe-Acetaminophen Other (comments)     GI Upset    Seafood [Shellfish Containing Products] Rash       Objective:  Visit Vitals    /74 (BP 1 Location: Left arm, BP Patient Position: Sitting)    Pulse 81    Temp 98.4 °F (36.9 °C) (Oral)    Resp 20    Ht 5' 4\" (1.626 m)    Wt 149 lb (67.6 kg)    SpO2 96%    BMI 25.58 kg/m2    Body mass index is 25.58 kg/(m^2). Problem List: Reviewed with patient and discussed risk factors. Patient Active Problem List   Diagnosis Code    Hypertension I10    Hyperlipidemia E78.5    DJD (degenerative joint disease) M19.90    Vitamin D deficiency E55.9    Type II or unspecified type diabetes mellitus without mention of complication, not stated as uncontrolled E11.9    Type 2 diabetes mellitus without complication ([facility] [address] Y37.4       PSH: Reviewed with patient  History reviewed. No pertinent surgical history.      SH: Reviewed with patient  Social History   Substance Use Topics    Smoking status: Former Smoker     Packs/day: 2.00     Years: 20.00     Types: Cigarettes     Quit date: 1/1/1982    Smokeless tobacco: Never Used    Alcohol use No       FH: Reviewed with patient  Family History   Problem Relation Age of Onset    Hypertension Mother     Hypertension Father     Diabetes Sister     Cancer Son      colon cancer       Current medical providers:    Patient Care Team:  Ivette Tucker MD as PCP - General (Internal Medicine)  Graciela Triplett LPN  [facility]  Yogesh Bronson RN as Ambulatory Care Navigator (Internal Medicine)    Plan:      Orders Placed This Encounter    METABOLIC PANEL, COMPREHENSIVE    HEMOGLOBIN A1C WITH EAG    REFERRAL TO ORTHOPEDICS    azithromycin (ZITHROMAX Z-GRACY) 250 mg tablet    traMADol (ULTRAM) 50 mg tablet    simvastatin (ZOCOR) 10 mg tablet    meloxicam (MOBIC) 7.5 mg tablet    chlorpheniramine-HYDROcodone (TUSSIONEX) 10-8 mg/5 mL suspension       Health Maintenance   Topic Date Due    ZOSTER VACCINE AGE 60>  01/07/1999    Pneumococcal 65+ Low/Medium Risk (2 of 2 - PCV13) 10/29/2015    EYE EXAM RETINAL OR DILATED Q1  02/14/2016    GLAUCOMA SCREENING Q2Y  02/14/2017    INFLUENZA AGE 9 TO ADULT  08/01/2017    MICROALBUMIN Q1  10/10/2017    LIPID PANEL Q1  10/10/2017    HEMOGLOBIN A1C Q6M  10/12/2017    FOOT EXAM Q1  01/18/2018    MEDICARE YEARLY EXAM  06/13/2018    DTaP/Tdap/Td series (2 - Td) 06/12/2027       *Patient verbalized understanding and agreement with the plan. A copy of the After Visit Summary with personalized health plan was given to the patient today. Physical Exam will be performed by Provider and documented under a separate Progress Note.

## 2017-06-12 NOTE — MR AVS SNAPSHOT
Visit Information Date & Time Provider Department Dept. Phone Encounter #  
 6/12/2017  9:45 AM Graciela Abad MD [facility] Internal Medicine [phone] Your Appointments 10/23/2017  8:45 AM  
ROUTINE CARE with Graciela Abad MD  
[facility] Internal Medicine Oksana Etta) Appt Note: 4 month followup [address] [address] [facility] [address]  
[phone]  
  
   
 [address] [facility] [address] [place] Upcoming Health Maintenance Date Due ZOSTER VACCINE AGE 60> 1/7/1999 Pneumococcal 65+ Low/Medium Risk (2 of 2 - PCV13) 10/29/2015 EYE EXAM RETINAL OR DILATED Q1 2/14/2016 GLAUCOMA SCREENING Q2Y 2/14/2017 INFLUENZA AGE 9 TO ADULT 8/1/2017 MICROALBUMIN Q1 10/10/2017 LIPID PANEL Q1 10/10/2017 HEMOGLOBIN A1C Q6M 10/12/2017 FOOT EXAM Q1 1/18/2018 MEDICARE YEARLY EXAM 6/13/2018 DTaP/Tdap/Td series (2 - Td) 6/12/2027 Allergies as of 6/12/2017  Review Complete On: 6/12/2017 By: Graciela Abad MD  
  
 Severity Noted Reaction Type Reactions Amoxicillin  02/23/2012   Systemic Nausea and Vomiting Hydrocod-cpm-pe-acetaminophen  03/19/2010    Other (comments) GI Upset Seafood [Shellfish Containing Products]  04/20/2012   Topical Rash Current Immunizations  Reviewed on 11/20/2014 Name Date Influenza Vaccine 11/18/2014,  Incomplete Influenza Vaccine Split 11/2/2012, 9/27/2011, 10/14/2010 Pneumococcal Polysaccharide (PPSV-23) 10/29/2014 Not reviewed this visit You Were Diagnosed With   
  
 Codes Comments URTI (acute upper respiratory infection)    -  Primary ICD-10-CM: J06.9 ICD-9-CM: 465.9 Type 2 diabetes mellitus without complication, without long-term current use of insulin (HCC)     ICD-10-CM: E11.9 ICD-9-CM: 250.00 Essential hypertension     ICD-10-CM: I10 
ICD-9-CM: 401.9 Pure hypercholesterolemia     ICD-10-CM: E78.00 ICD-9-CM: 272.0 Hearing loss, unspecified hearing loss type, unspecified laterality     ICD-10-CM: H91.90 ICD-9-CM: 389.9 Primary osteoarthritis of both knees     ICD-10-CM: M17.0 ICD-9-CM: 715.16 Primary osteoarthritis involving multiple joints     ICD-10-CM: M15.0 ICD-9-CM: 715.09 Vitals BP Pulse Temp Resp Height(growth percentile) Weight(growth percentile) 153/74 (BP 1 Location: Left arm, BP Patient Position: Sitting) 81 98.4 °F (36.9 °C) (Oral) 20 5' 4\" (1.626 m) 149 lb (67.6 kg) SpO2 BMI Smoking Status 96% 25.58 kg/m2 Former Smoker Vitals History BMI and BSA Data Body Mass Index Body Surface Area 25.58 kg/m 2 1.75 m 2 Preferred Pharmacy Pharmacy Name Phone [id number] [address] [phone] Your Updated Medication List  
  
   
This list is accurate as of: 6/12/17 11:52 AM.  Always use your most recent med list.  
  
  
  
  
 acetaminophen 500 mg tablet Commonly known as:  TYLENOL Take  by mouth every six (6) hours as needed. aspirin 81 mg chewable tablet Take 1 Tab by mouth every other day. azithromycin 250 mg tablet Commonly known as:  Jacey Mazariegos Take two tablets today then one tablet daily  
  
 chlorpheniramine-HYDROcodone 10-8 mg/5 mL suspension Commonly known as:  Citlaly Santosat Take 5 mL by mouth every twelve (12) hours as needed for Cough. Max Daily Amount: 10 mL. clobetasol 0.05 % ointment Commonly known as:  Ildefonso August Apply  to affected area two (2) times a day. diclofenac 1 % Gel Commonly known as:  VOLTAREN  
APPLY 2 GRAMS TO AFFECTED AREA TWO (2) TIMES A DAY. fluticasone 50 mcg/actuation nasal spray Commonly known as:  Ariana Purcell INHALE 1 SPRAY EACH NOSTRIL AT BEDTIME  
  
 glipiZIDE SR 2.5 mg CR tablet Commonly known as:  GLUCOTROL XL  
TAKE 1 TABLET BY MOUTH EVERY DAY  
  
 hydrocortisone 2.5 % lotion Commonly known as:  HYTONE  
APPLY A THIN LAYER TO AFFECTED AREA TWICE DAILY Iron 325 mg (65 mg iron) tablet Generic drug:  ferrous sulfate TAKE 1 TABLET BY MOUTH DAILY (BEFORE BREAKFAST). JANUMET XR 50-1,000 mg Tm24 Generic drug:  SITagliptin-metFORMIN  
TAKE 2 TABLETS BY MOUTH EVERY DAY  
  
 lisinopril 10 mg tablet Commonly known as:  PRINIVIL, ZESTRIL  
TAKE 1 TABLET BY MOUTH DAILY DISCONTINUE HCTZ  
  
 meloxicam 7.5 mg tablet Commonly known as:  MOBIC Take 1 Tab by mouth daily. OTHER  
500 mg. [facility] pain relief  
  
 senna-docusate 8.6-50 mg per tablet Commonly known as:  SENNA PLUS Take 1 Tab by mouth daily. simvastatin 10 mg tablet Commonly known as:  ZOCOR Take 1 Tab by mouth nightly. SYSTANE (PROPYLENE GLYCOL) 0.4-0.3 % Drop Generic drug:  peg 400-propylene glycol Administer 1 Drop to left eye as needed. tamsulosin 0.4 mg capsule Commonly known as:  FLOMAX TAKE ONE CAPSULE BY MOUTH EVERY DAY  
  
 traMADol 50 mg tablet Commonly known as:  ULTRAM  
Take 1 Tab by mouth daily as needed for Pain.  
  
 triamcinolone 55 mcg nasal inhaler Commonly known as:  NASACORT AQ  
1 [place] by Both Nostrils route nightly. VITAMIN D3 1,000 unit Cap Generic drug:  cholecalciferol TAKE ONE CAPSULE BY MOUTH EVERY DAY Prescriptions Printed Refills  
 traMADol (ULTRAM) 50 mg tablet 0 Sig: Take 1 Tab by mouth daily as needed for Pain. Class: Print Route: Oral  
 chlorpheniramine-HYDROcodone (TUSSIONEX) 10-8 mg/5 mL suspension 0 Sig: Take 5 mL by mouth every twelve (12) hours as needed for Cough. Max Daily Amount: 10 mL. Class: Print Route: Oral  
  
Prescriptions Sent to Pharmacy Refills  
 azithromycin (ZITHROMAX Z-GRACY) 250 mg tablet 0 Sig: Take two tablets today then one tablet daily  Class: Normal  
 Pharmacy: [facility]/pharmacy #[id number]- Elissa [facility] Ph #: [phone]  
 simvastatin (ZOCOR) 10 mg tablet 6 Sig: Take 1 Tab by mouth nightly. Class: Normal  
 Pharmacy: [facility]/pharmacy #[id number] Ph #: [phone] Route: Oral  
 meloxicam (MOBIC) 7.5 mg tablet 5 Sig: Take 1 Tab by mouth daily. Class: Normal  
 Pharmacy: [facility]/pharmacy #[id number] Ph #: [phone] Route: Oral  
  
We Performed the Following HEMOGLOBIN A1C WITH EAG [01000 CPT(R)] METABOLIC PANEL, COMPREHENSIVE [73143 CPT(R)] REFERRAL TO ORTHOPEDICS [NAH611 Custom] Comments:  
 Please evaluate patient for DJD knee Referral Information Referral ID Referred By Referred To  
  
 [id number] Essie BRODERICK, [address] [facility] [address] Phone: [phone] Fax: [phone] Visits Status Start Date End Date 1 New Request 6/12/17 6/12/18 If your referral has a status of pending review or denied, additional information will be sent to support the outcome of this decision. Patient Instructions Knee Arthritis: Exercises Your Care Instructions Here are some examples of exercises for knee arthritis. Start each exercise slowly. Ease off the exercise if you start to have pain. Your doctor or physical therapist will tell you when you can start these exercises and which ones will work best for you. How to do the exercises Knee flexion with heel slide 1. Lie on your back with your knees bent. 2. Slide your heel back by bending your affected knee as far as you can. Then hook your other foot around your ankle to help pull your heel even farther back. 3. Hold for about 6 seconds, then rest for up to 10 seconds. 4. Repeat 8 to 12 times. 5. Switch legs and repeat steps 1 through 4, even if only one knee is sore. Zesty 1. Sit with your affected leg straight and supported on the floor or a firm bed. Place a small, rolled-up towel under your knee. Your other leg should be bent, with that foot flat on the floor. 2. Tighten the thigh muscles of your affected leg by pressing the back of your knee down into the towel. 3. Hold for about 6 seconds, then rest for up to 10 seconds. 4. Repeat 8 to 12 times. 5. Switch legs and repeat steps 1 through 4, even if only one knee is sore. Straight-leg raises to the front 1. Lie on your back with your good knee bent so that your foot rests flat on the floor. Your affected leg should be straight. Make sure that your low back has a normal curve. You should be able to slip your hand in between the floor and the small of your back, with your palm touching the floor and your back touching the back of your hand. 2. Tighten the thigh muscles in your affected leg by pressing the back of your knee flat down to the floor. Hold your knee straight. 3. Keeping the thigh muscles tight and your leg straight, lift your affected leg up so that your heel is about 12 inches off the floor. Hold for about 6 seconds, then lower slowly. 4. Relax for up to 10 seconds between repetitions. 5. Repeat 8 to 12 times. 6. Switch legs and repeat steps 1 through 5, even if only one knee is sore. Active knee flexion 1. Lie on your stomach with your knees straight. If your kneecap is uncomfortable, roll up a washcloth and put it under your leg just above your kneecap. 2. Lift the foot of your affected leg by bending the knee so that you bring the foot up toward your buttock. If this motion hurts, try it without bending your knee quite as far. This may help you avoid any painful motion. 3. Slowly move your leg up and down. 4. Repeat 8 to 12 times. 5. Switch legs and repeat steps 1 through 4, even if only one knee is sore. Quadriceps stretch (facedown) 1. Lie flat on your stomach, and rest your face on the floor. 2. Wrap a towel or belt strap around the lower part of your affected leg. Then use the towel or belt strap to slowly pull your heel toward your buttock until you feel a stretch. 3. Hold for about 15 to 30 seconds, then relax your leg against the towel or belt strap. 4. Repeat 2 to 4 times. 5. Switch legs and repeat steps 1 through 4, even if only one knee is sore. Stationary exercise bike If you do not have a stationary exercise bike at home, you can find one to ride at your local health club or community center. 1. Adjust the height of the bike seat so that your knee is slightly bent when your leg is extended downward. If your knee hurts when the pedal reaches the top, you can raise the seat so that your knee does not bend as much. 2. Start slowly. At first, try to do 5 to 10 minutes of cycling with little to no resistance. Then increase your time and the resistance bit by bit until you can do 20 to 30 minutes without pain. 3. If you start to have pain, rest your knee until your pain gets back to the level that is normal for you. Or cycle for less time or with less effort. Follow-up care is a key part of your treatment and safety. Be sure to make and go to all appointments, and call your doctor if you are having problems. It's also a good idea to know your test results and keep a list of the medicines you take. Where can you learn more? Go to http://aliyah-leigh.info/. Enter C159 in the search box to learn more about \"Knee Arthritis: Exercises. \" Current as of: May 23, 2016 Content Version: 11.2 © 0180-7674 [employer]. Care instructions adapted under license by [employer] (which disclaims liability or warranty for this information).  If you have questions about a medical condition or this instruction, always ask your healthcare professional. [employer] disclaims any warranty or liability for your use of this information. Schedule of Personalized Health Plan (Provide Copy to Patient) The best way to stay healthy is to live a healthy lifestyle. A healthy lifestyle includes regular exercise, eating a well-balanced diet, keeping a healthy weight and not smoking. Regular physical exams and screening tests are another important way to take care of yourself. Preventive exams provided by health care providers can find health problems early when treatment works best and can keep you from getting certain diseases or illnesses. Preventive services include exams, lab tests, screenings, shots, monitoring and information to help you take care of your own health. All people over 65 should have a pneumonia shot. Pneumonia shots are usually only needed once in a lifetime unless your doctor decides differently. All people over 65 should have a yearly flu shot. People over 65 are at medium to high risk for Hepatitis B. Three shots are needed for complete protection. In addition to your physical exam, some screening tests are recommended: 
 
Bone mass measurement (dexa scan) is recommended every two years if you have certain risk factors, such as personal history of vertebral fracture or chronic steroid medication use Diabetes Mellitus screening is recommended every year. Glaucoma is an eye disease caused by high pressure in the eye. An eye exam is recommended every year. Cardiovascular screening tests that check your cholesterol and other blood fat (lipid) levels are recommended every five years. Colorectal Cancer screening tests help to find pre-cancerous polyps (growths in the colon) so they can be removed before they turn into cancer. Tests ordered for screening depend on your personal and family history risk factors.  
 
Screening for Prostate Cancer is recommended yearly with a digital rectal exam and/or a PSA test 
 
 Here is a list of your current Health Maintenance items with a due date: 
Health Maintenance Topic Date Due  
 ZOSTER VACCINE AGE 60>  01/07/1999  Pneumococcal 65+ Low/Medium Risk (2 of 2 - PCV13) 10/29/2015  
 EYE EXAM RETINAL OR DILATED Q1  02/14/2016  GLAUCOMA SCREENING Q2Y  02/14/2017  INFLUENZA AGE 9 TO ADULT  08/01/2017  MICROALBUMIN Q1  10/10/2017  LIPID PANEL Q1  10/10/2017  
 HEMOGLOBIN A1C Q6M  10/12/2017  
 FOOT EXAM Q1  01/18/2018  MEDICARE YEARLY EXAM  06/13/2018  DTaP/Tdap/Td series (2 - Td) 06/12/2027 I am sending information on both the Shingles vaccine and 2nd Pneumonia vaccine for you to consider. Both of these vaccines are able to be given at the pharmacy. The Shingles vaccine has been known to have a high cost on certain insurance coverages, so please speak with the pharmacist regarding the cost on your specific plan. As we discussed when you were in the office, Vernia Ormond is due for his eyes to be checked again. A glaucoma and retinal screening should be checked during this exam. The glaucoma exam checks pressures within the eyes and a retinal exam will assess damage that may be caused from diabetes. It was a pleasure to speak with you and thank you for your time, 
Zarina Liriano, RN Nurse Navigator Shingles Vaccine: What You Need to Know What is shingles? Shingles is a painful skin rash, often with blisters. It is also called herpes zoster, or just zoster. A shingles rash usually appears on one side of the face or body and lasts from 2 to 4 weeks. Its main symptom is pain, which can be quite severe. Other symptoms of shingles can include fever, headache, chills and upset stomach. Very rarely, a shingles infection can lead to pneumonia, hearing problems, blindness, brain inflammation (encephalitis) or death. For about 1 person in 5, severe pain can continue even long after the rash clears up. This is called post-herpetic neuralgia. Shingles is caused by the varicella zoster virus, the same virus that causes chickenpox. Only someone who has had chickenpoxor, rarely, has gotten chickenpox vaccinecan get shingles. The virus stays in your body, and can cause shingles many years later. You can't catch shingles from another person with shingles. However, a person who has never had chickenpox (or chickenpox vaccine) could get chickenpox from someone with shingles. This is not very common. Shingles is far more common in people 48years of age and older than in younger people. It is also more common in people whose immune systems are weakened because of a disease such as cancer, or drugs such as steroids or chemotherapy. At least 1 million people a year in the [facility] get shingles. Shingles vaccine A vaccine for shingles was licensed in 1780. In clinical trials, the vaccine reduced the risk of shingles by 50%. It can also reduce pain in people who still get shingles after being vaccinated. A single dose of shingles vaccine is recommended for adults 61years of age and older. Some people should not get shingles vaccine or should wait A person should not get shingles vaccine who: 
· Has ever had a life-threatening allergic reaction to gelatin, the antibiotic neomycin, or any other component of shingles vaccine. Tell your doctor if you have any severe allergies. · Has a weakened immune system because of current: 
¨ AIDS or another disease that affects the immune system. ¨ Treatment with drugs that affect the immune system, such as prolonged use of high-dose steroids. ¨ Cancer treatment such as radiation or chemotherapy. ¨ Cancer affecting the bone marrow or lymphatic system, such as leukemia or lymphoma. · Is pregnant, or might be pregnant. Women should not become pregnant until at least 4 weeks after getting shingles vaccine. Someone with a minor acute illness, such as a cold, may be vaccinated.  But anyone with a moderate or severe acute illness should usually wait until they recover before getting the vaccine. This includes anyone with a temperature of 101.3° F or higher. What are the risks from shingles vaccine? A vaccine, like any medicine, could possibly cause serious problems, such as severe allergic reactions. However, the risk of a vaccine causing serious harm, or death, is extremely small. No serious problems have been identified with shingles vaccine. Mild problems · Redness, soreness, swelling, or itching at the site of the injection (about 1 person in 3) · Headache (about 1 person in 79) Like all vaccines, shingles vaccine is being closely monitored for unusual or severe problems. What if there is a serious reaction? What should I look for? · Look for anything that concerns you, such as signs of a severe allergic reaction, very high fever, or behavior changes. Signs of a severe allergic reaction can include hives, swelling of the face and throat, difficulty breathing, a fast heartbeat, dizziness, and weakness. These would start a few minutes to a few hours after the vaccination. What should I do? · If you think it is a severe allergic reaction or other emergency that can't wait, call 9-1-1 or get the person to the nearest hospital. Otherwise, call your doctor. · Afterward, the reaction should be reported to the Vaccine Adverse Event Reporting System (VAERS). Your doctor might file this report, or you can do it yourself through the VAERS web site at www.vaers. hhs.gov, or by calling 9-419.230.4110. VAERS is only for reporting reactions. They do not give medical advice. How can I learn more? · Ask your doctor. · Call your local or state health department. · Contact the Centers for Disease Control and Prevention (CDC): 
¨ Call 8-254.753.2551 (5-099-YRY-INFO) or ¨ Visit CDC's website at www.cdc.gov/vaccines Vaccine Information Statement Shingles Vaccine 
(10/6/2009) Department of Health and [place] [employer] Centers for Disease Control and Prevention Many Vaccine Information Statements are available in [place] and other languages. See www.immunize.org/vis. Muchas hojas de información sobre vacunas están disponibles en español y en otros idiomas. Visite www.immunize.org/vis. Care instructions adapted under license by your healthcare professional. If you have questions about a medical condition or this instruction, always ask your healthcare professional. [place] [address] any warranty or liability for your use of this information. Pneumococcal Conjugate Vaccine (PCV13): What You Need to Know Why get vaccinated? Vaccination can protect both children and adults from pneumococcal disease. Pneumococcal disease is caused by bacteria that can spread from person to person through close contact. It can cause ear infections, and it can also lead to more serious infections of the: 
· Lungs (pneumonia). · Blood (bacteremia). · Covering of the brain and spinal cord (meningitis). Pneumococcal pneumonia is most common among adults. Pneumococcal meningitis can cause deafness and brain damage, and it kills about 1 child in 10 who get it. Anyone can get pneumococcal disease, but children under 3years of age and adults 72 years and older, people with certain medical conditions, and cigarette smokers are at the highest risk. Before there was a vaccine, the [facility] saw the following in children under 5 each year from pneumococcal disease: · More than 700 cases of meningitis · About 13,000 blood infections · About 5 million ear infections · About 200 deaths Since the vaccine became available, severe pneumococcal disease in these children has fallen by 88%. About 18,000 older adults die of pneumococcal disease each year in the [place].  
Treatment of pneumococcal infections with penicillin and other drugs is not as effective as it used to be, because some strains of the disease have become resistant to these drugs. This makes prevention of the disease through vaccination even more important. PCV13 vaccine Pneumococcal conjugate vaccine (called PCV13) protects against 13 types of pneumococcal bacteria. PCV13 is routinely given to children at 2, 4, 6, and 1515 months of age. It is also recommended for children and adults 3to 59years of age with certain health conditions, and for all adults 72years of age and older. Your doctor can give you details. Some people should not get this vaccine Anyone who has ever had a life-threatening allergic reaction to a dose of this vaccine, to an earlier pneumococcal vaccine called PCV7, or to any vaccine containing diphtheria toxoid (for example, DTaP), should not get PCV13. Anyone with a severe allergy to any component of PCV13 should not get the vaccine. Tell your doctor if the person being vaccinated has any severe allergies. If the person scheduled for vaccination is not feeling well, your healthcare provider might decide to reschedule the shot on another day. Risks of a vaccine reaction With any medicine, including vaccines, there is a chance of reactions. These are usually mild and go away on their own, but serious reactions are also possible. Problems reported following PCV13 varied by age and dose in the series. The most common problems reported among children were: · About half became drowsy after the shot, had a temporary loss of appetite, or had redness or tenderness where the shot was given. · About 1 out of 3 had swelling where the shot was given. · About 1 out of 3 had a mild fever, and about 1 in 20 had a fever over 102.2°F. 
· Up to about 8 out of 10 became fussy or irritable. Adults have reported pain, redness, and swelling where the shot was given; also mild fever, fatigue, headache, chills, or muscle pain. Cate Fan children who get PCV13 along with inactivated flu vaccine at the same time may be at increased risk for seizures caused by fever. Ask your doctor for more information. Problems that could happen after any vaccine: · People sometimes faint after a medical procedure, including vaccination. Sitting or lying down for about 15 minutes can help prevent fainting and the injuries caused by a fall. Tell your doctor if you feel dizzy or have vision changes or ringing in the ears. · Some older children and adults get severe pain in the shoulder and have difficulty moving the arm where a shot was given. This happens very rarely. · Any medication can cause a severe allergic reaction. Such reactions from a vaccine are very rare, estimated at about 1 in a million doses, and would happen within a few minutes to a few hours after the vaccination. As with any medicine, there is a very small chance of a vaccine causing a serious injury or death. The safety of vaccines is always being monitored. For more information, visit: www.cdc.gov/vaccinesafety. What if there is a serious reaction? What should I look for? · Look for anything that concerns you, such as signs of a severe allergic reaction, very high fever, or unusual behavior. Signs of a severe allergic reaction can include hives, swelling of the face and throat, difficulty breathing, a fast heartbeat, dizziness, and weakness, usually within a few minutes to a few hours after the vaccination. What should I do? · If you think it is a severe allergic reaction or other emergency that can't wait, call 911 or get the person to the nearest hospital. Otherwise, call your doctor. · Reactions should be reported to the Vaccine Adverse Event Reporting System (VAERS). Your doctor should file this report, or you can do it yourself through the VAERS website at www.vaers. [facility].gov, or by calling 7-831.668.5745. VAERS does not give medical advice. The National Vaccine Injury Compensation Program 
The National Vaccine Injury Compensation Program (VICP) is a federal program that was created to compensate people who may have been injured by certain vaccines. Persons who believe they may have been injured by a vaccine can learn about the program and about filing a claim by calling 2-993.116.2170 or visiting the [employer] website at www.[facility].gov/vaccinecompensation. There is a time limit to file a claim for compensation. How can I learn more? · Ask your healthcare provider. He or she can give you the vaccine package insert or suggest other sources of information. · Call your local or state health department. · Contact the Centers for Disease Control and Prevention (CDC): 
¨ Call 9-659.528.5930 (1-800-CDC-INFO) or ¨ Visit CDC's website at www.cdc.gov/vaccines Vaccine Information Statement PCV13 Vaccine 11/5/2015 
42 UJamilah Hernandez [id number] [facility] and [place] Centers for Disease Control and Prevention Many Vaccine Information Statements are available in Uz[place] and other languages. See www.immunize.org/vis. Muchas hojas de información sobre vacunas están disponibles en español y en otros idiomas. Visite www.immunize.org/vis. Care instructions adapted under license by your healthcare professional. If you have questions about a medical condition or this instruction, always ask your healthcare professional. [place] [address] any warranty or liability for your use of this information. Dilated Retinal Exam: About This Test 
What is it? A dilated retinal exam lets your doctor see the inside of the back of your eye. To do the test, the doctor uses a light and a magnifying tool called an ophthalmoscope. Why is this test done? This test is done to look for eye problems and eye diseases. It also can be used to find other problems, such as head injuries or brain tumors. It is usually part of a regular eye exam. You may also have a vision test and a test for glaucoma. What happens during the test? 
Tell your doctor if you or anyone in your family has glaucoma. And tell your doctor if you are allergic to any type of eyedrops. Your doctor will use eyedrops to widen (dilate) your pupils. This makes it easier to see the back of the eye. Your doctor may also use eyedrops to numb the surface of your eyes. It takes about 15 to 20 minutes to fully dilate the pupils. The dilating eyedrops may make your eyes sting. They may also cause a medicine taste in your mouth. When your pupils are dilated, your doctor will shine a bright light into your eyes and examine them. What happens after the test? 
· Your vision will be blurry for several hours. · You will probably be able to go home or back to your usual activities right away. But your eyes will be sensitive. Wear sunglasses to protect them from the sun. · Do not drive for several hours after your eyes were dilated. When should you call for help? Watch closely for changes in your health, and be sure to contact your doctor if you have questions about the test. 
Follow-up care is a key part of your treatment and safety. Be sure to make and go to all appointments, and call your doctor if you are having problems. It's also a good idea to keep a list of the medicines you take. Ask your doctor when you can expect to have your test results. Where can you learn more? Go to http://aliyah-leigh.info/. Enter G936 in the search box to learn more about \"Dilated Retinal Exam: About This Test.\" Current as of: May 23, 2016 Content Version: 11.2 © 1402-9691 The [employer] website. Care instructions adapted under license by [employer] (which disclaims liability or warranty for this information).  If you have questions about a medical condition or this instruction, always ask your healthcare professional. [employer] disclaims any warranty or liability for your use of this information. Introducing [facility]! Dear Sly Rangel: 
Thank you for requesting a [employer] account. Our records indicate that you have previously registered for a [employer] account but its currently inactive. Please call our [employer] support line at [phone]. Additional Information If you have questions, please visit the Frequently Asked Questions section of the [employer] website at [email or web address] Remember, [employer] is NOT to be used for urgent needs. For medical emergencies, dial 911. Now available from your iPhone and Android! Please provide this summary of care documentation to your next provider. Your primary care clinician is listed as Garrison Fajardo. If you have any questions after today's visit, please call [phone].

## 2017-06-12 NOTE — PROGRESS NOTES
Health Maintenance Due   Topic Date Due    DTaP/Tdap/Td series (1 - Tdap) 01[birth year]07/1960    ZOSTER VACCINE AGE 60>  01/07/1999    Pneumococcal 65+ Low/Medium Risk (2 of 2 - PCV13) 10/29/2015    EYE EXAM RETINAL OR DILATED Q1  02/14/2016    GLAUCOMA SCREENING Q2Y  02/14/2017    MEDICARE YEARLY EXAM  05/03/2017       Chief Complaint   Patient presents with    Hypertension     4 month follow up    Cholesterol Problem    Diabetes    Cold Symptoms     nasal and chest congestion       1. Have you been to the ER, urgent care clinic since your last visit? Hospitalized since your last visit? No    2. Have you seen or consulted any other health care providers outside of the [address] [place] [address] since your last visit? Include any pap smears or colon screening. No    3) Do you have an Advance Directive on file? no    4) Are you interested in receiving information on Advance Directives? NO      Patient is accompanied by self I have received verbal consent from [address] [place] [address] to discuss any/all medical information while they are present in the room.

## 2017-06-12 NOTE — PATIENT INSTRUCTIONS
Knee Arthritis: Exercises  Your Care Instructions  Here are some examples of exercises for knee arthritis. Start each exercise slowly. Ease off the exercise if you start to have pain. Your doctor or physical therapist will tell you when you can start these exercises and which ones will work best for you. How to do the exercises  Knee flexion with heel slide    1. Lie on your back with your knees bent. 2. Slide your heel back by bending your affected knee as far as you can. Then hook your other foot around your ankle to help pull your heel even farther back. 3. Hold for about 6 seconds, then rest for up to 10 seconds. 4. Repeat 8 to 12 times. 5. Switch legs and repeat steps 1 through 4, even if only one knee is sore. Quad sets    1. Sit with your affected leg straight and supported on the floor or a firm bed. Place a small, rolled-up towel under your knee. Your other leg should be bent, with that foot flat on the floor. 2. Tighten the thigh muscles of your affected leg by pressing the back of your knee down into the towel. 3. Hold for about 6 seconds, then rest for up to 10 seconds. 4. Repeat 8 to 12 times. 5. Switch legs and repeat steps 1 through 4, even if only one knee is sore. Straight-leg raises to the front    1. Lie on your back with your good knee bent so that your foot rests flat on the floor. Your affected leg should be straight. Make sure that your low back has a normal curve. You should be able to slip your hand in between the floor and the small of your back, with your palm touching the floor and your back touching the back of your hand. 2. Tighten the thigh muscles in your affected leg by pressing the back of your knee flat down to the floor. Hold your knee straight. 3. Keeping the thigh muscles tight and your leg straight, lift your affected leg up so that your heel is about 12 inches off the floor. Hold for about 6 seconds, then lower slowly.   4. Relax for up to 10 seconds between repetitions. 5. Repeat 8 to 12 times. 6. Switch legs and repeat steps 1 through 5, even if only one knee is sore. Active knee flexion    1. Lie on your stomach with your knees straight. If your kneecap is uncomfortable, roll up a washcloth and put it under your leg just above your kneecap. 2. Lift the foot of your affected leg by bending the knee so that you bring the foot up toward your buttock. If this motion hurts, try it without bending your knee quite as far. This may help you avoid any painful motion. 3. Slowly move your leg up and down. 4. Repeat 8 to 12 times. 5. Switch legs and repeat steps 1 through 4, even if only one knee is sore. Quadriceps stretch (facedown)    1. Lie flat on your stomach, and rest your face on the floor. 2. Wrap a towel or belt strap around the lower part of your affected leg. Then use the towel or belt strap to slowly pull your heel toward your buttock until you feel a stretch. 3. Hold for about 15 to 30 seconds, then relax your leg against the towel or belt strap. 4. Repeat 2 to 4 times. 5. Switch legs and repeat steps 1 through 4, even if only one knee is sore. Stationary exercise bike    If you do not have a stationary exercise bike at home, you can find one to ride at your local health club or community center. 1. Adjust the height of the bike seat so that your knee is slightly bent when your leg is extended downward. If your knee hurts when the pedal reaches the top, you can raise the seat so that your knee does not bend as much. 2. Start slowly. At first, try to do 5 to 10 minutes of cycling with little to no resistance. Then increase your time and the resistance bit by bit until you can do 20 to 30 minutes without pain. 3. If you start to have pain, rest your knee until your pain gets back to the level that is normal for you. Or cycle for less time or with less effort. Follow-up care is a key part of your treatment and safety.  Be sure to make and go to all appointments, and call your doctor if you are having problems. It's also a good idea to know your test results and keep a list of the medicines you take. Where can you learn more? Go to http://aliyah-leigh.info/. Enter C159 in the search box to learn more about \"Knee Arthritis: Exercises. \"  Current as of: May 23, 2016  Content Version: 11.2  © [id number] [email or web address]. Care instructions adapted under license by [employer] (which disclaims liability or warranty for this information). If you have questions about a medical condition or this instruction, always ask your healthcare professional. [place] [address] any warranty or liability for your use of this information. Schedule of Personalized Health Plan  (Provide Copy to Patient)  The best way to stay healthy is to live a healthy lifestyle. A healthy lifestyle includes regular exercise, eating a well-balanced diet, keeping a healthy weight and not smoking. Regular physical exams and screening tests are another important way to take care of yourself. Preventive exams provided by health care providers can find health problems early when treatment works best and can keep you from getting certain diseases or illnesses. Preventive services include exams, lab tests, screenings, shots, monitoring and information to help you take care of your own health. All people over 65 should have a pneumonia shot. Pneumonia shots are usually only needed once in a lifetime unless your doctor decides differently. All people over 65 should have a yearly flu shot. People over 65 are at medium to high risk for Hepatitis B. Three shots are needed for complete protection.   In addition to your physical exam, some screening tests are recommended:    Bone mass measurement (dexa scan) is recommended every two years if you have certain risk factors, such as personal history of vertebral fracture or chronic steroid medication use    Diabetes Mellitus screening is recommended every year. Glaucoma is an eye disease caused by high pressure in the eye. An eye exam is recommended every year. Cardiovascular screening tests that check your cholesterol and other blood fat (lipid) levels are recommended every five years. Colorectal Cancer screening tests help to find pre-cancerous polyps (growths in the colon) so they can be removed before they turn into cancer. Tests ordered for screening depend on your personal and family history risk factors. Screening for Prostate Cancer is recommended yearly with a digital rectal exam and/or a PSA test    Here is a list of your current Health Maintenance items with a due date:  Health Maintenance   Topic Date Due    ZOSTER VACCINE AGE 60>  01/07/1999    Pneumococcal 65+ Low/Medium Risk (2 of 2 - PCV13) 10/29/2015    EYE EXAM RETINAL OR DILATED Q1  02/14/2016    GLAUCOMA SCREENING Q2Y  02/14/2017    INFLUENZA AGE 9 TO ADULT  08/01/2017    MICROALBUMIN Q1  10/10/2017    LIPID PANEL Q1  10/10/2017    HEMOGLOBIN A1C Q6M  10/12/2017    FOOT EXAM Q1  01/18/2018    MEDICARE YEARLY EXAM  06/13/2018    DTaP/Tdap/Td series (2 - Td) 06/12/2027     I am sending information on both the Shingles vaccine and 2nd Pneumonia vaccine for you to consider. Both of these vaccines are able to be given at the pharmacy. The Shingles vaccine has been known to have a high cost on certain insurance coverages, so please speak with the pharmacist regarding the cost on your specific plan. As we discussed when you were in the office, Lorrane Goodpasture is due for his eyes to be checked again. A glaucoma and retinal screening should be checked during this exam. The glaucoma exam checks pressures within the eyes and a retinal exam will assess damage that may be caused from diabetes.     It was a pleasure to speak with you and thank you for your time,  Deepika Francis, RN Nurse Navigator         Shingles Vaccine: What You Need to Know  What is shingles? Shingles is a painful skin rash, often with blisters. It is also called herpes zoster, or just zoster. A shingles rash usually appears on one side of the face or body and lasts from 2 to 4 weeks. Its main symptom is pain, which can be quite severe. Other symptoms of shingles can include fever, headache, chills and upset stomach. Very rarely, a shingles infection can lead to pneumonia, hearing problems, blindness, brain inflammation (encephalitis) or death. For about 1 person in 5, severe pain can continue even long after the rash clears up. This is called post-herpetic neuralgia. Shingles is caused by the varicella zoster virus, the same virus that causes chickenpox. Only someone who has had chickenpox--or, rarely, has gotten chickenpox vaccine--can get shingles. The virus stays in your body, and can cause shingles many years later. You can't catch shingles from another person with shingles. However, a person who has never had chickenpox (or chickenpox vaccine) could get chickenpox from someone with shingles. This is not very common. Shingles is far more common in people 48years of age and older than in younger people. It is also more common in people whose immune systems are weakened because of a disease such as cancer, or drugs such as steroids or chemotherapy. At least 1 million people a year in the [facility] get shingles. Shingles vaccine  A vaccine for shingles was licensed in 5336. In clinical trials, the vaccine reduced the risk of shingles by 50%. It can also reduce pain in people who still get shingles after being vaccinated. A single dose of shingles vaccine is recommended for adults 61years of age and older. Some people should not get shingles vaccine or should wait  A person should not get shingles vaccine who:  · Has ever had a life-threatening allergic reaction to gelatin, the antibiotic neomycin, or any other component of shingles vaccine.  Tell your doctor if you have any severe allergies. · Has a weakened immune system because of current:  ¨ AIDS or another disease that affects the immune system. ¨ Treatment with drugs that affect the immune system, such as prolonged use of high-dose steroids. ¨ Cancer treatment such as radiation or chemotherapy. ¨ Cancer affecting the bone marrow or lymphatic system, such as leukemia or lymphoma. · Is pregnant, or might be pregnant. Women should not become pregnant until at least 4 weeks after getting shingles vaccine. Someone with a minor acute illness, such as a cold, may be vaccinated. But anyone with a moderate or severe acute illness should usually wait until they recover before getting the vaccine. This includes anyone with a temperature of 101.3° F or higher. What are the risks from shingles vaccine? A vaccine, like any medicine, could possibly cause serious problems, such as severe allergic reactions. However, the risk of a vaccine causing serious harm, or death, is extremely small. No serious problems have been identified with shingles vaccine. Mild problems  · Redness, soreness, swelling, or itching at the site of the injection (about 1 person in 3)  · Headache (about 1 person in 70)  Like all vaccines, shingles vaccine is being closely monitored for unusual or severe problems. What if there is a serious reaction? What should I look for? · Look for anything that concerns you, such as signs of a severe allergic reaction, very high fever, or behavior changes. Signs of a severe allergic reaction can include hives, swelling of the face and throat, difficulty breathing, a fast heartbeat, dizziness, and weakness. These would start a few minutes to a few hours after the vaccination. What should I do? · If you think it is a severe allergic reaction or other emergency that can't wait, call 9-1-1 or get the person to the nearest hospital. Otherwise, call your doctor.   · Afterward, the reaction should be reported to the Vaccine Adverse Event Reporting System (VAERS). Your doctor might file this report, or you can do it yourself through the VAERS web site at www.vaers. hhs.gov, or by calling 5-962.408.9734. VAERS is only for reporting reactions. They do not give medical advice. How can I learn more? · Ask your doctor. · Call your local or state health department. · Contact the Centers for Disease Control and Prevention (CDC):  ¨ Call 0-242.510.9199 (1-800-CDC-INFO) or  ¨ Visit CDC's website at www.cdc.gov/vaccines  Vaccine Information Statement  Shingles Vaccine  (10/6/2009)  Department of Health and Human Services  Centers for Disease Control and Prevention  Many Vaccine Information Statements are available in Japanese and other languages. See www.immunize.org/vis. Muchas hojas de información sobre vacunas están disponibles en español y en otros idiomas. Visite www.immunize.org/vis. Care instructions adapted under license by your healthcare professional. If you have questions about a medical condition or this instruction, always ask your healthcare professional. [place] [address] any warranty or liability for your use of this information. Pneumococcal Conjugate Vaccine (PCV13): What You Need to Know  Why get vaccinated? Vaccination can protect both children and adults from pneumococcal disease. Pneumococcal disease is caused by bacteria that can spread from person to person through close contact. It can cause ear infections, and it can also lead to more serious infections of the:  · Lungs (pneumonia). · Blood (bacteremia). · Covering of the brain and spinal cord (meningitis). Pneumococcal pneumonia is most common among adults. Pneumococcal meningitis can cause deafness and brain damage, and it kills about 1 child in 10 who get it.   Anyone can get pneumococcal disease, but children under 3years of age and adults 72 years and older, people with certain medical conditions, and cigarette smokers are at the highest risk. Before there was a vaccine, the [facility] saw the following in children under 5 each year from pneumococcal disease:  · More than 700 cases of meningitis  · About 13,000 blood infections  · About 5 million ear infections  · About 200 deaths  Since the vaccine became available, severe pneumococcal disease in these children has fallen by 88%. About 18,000 older adults die of pneumococcal disease each year in the [place]. Treatment of pneumococcal infections with penicillin and other drugs is not as effective as it used to be, because some strains of the disease have become resistant to these drugs. This makes prevention of the disease through vaccination even more important. PCV13 vaccine  Pneumococcal conjugate vaccine (called PCV13) protects against 13 types of pneumococcal bacteria. PCV13 is routinely given to children at 2, 4, 6, and 1515 months of age. It is also recommended for children and adults 3to 59years of age with certain health conditions, and for all adults 72years of age and older. Your doctor can give you details. Some people should not get this vaccine  Anyone who has ever had a life-threatening allergic reaction to a dose of this vaccine, to an earlier pneumococcal vaccine called PCV7, or to any vaccine containing diphtheria toxoid (for example, DTaP), should not get PCV13. Anyone with a severe allergy to any component of PCV13 should not get the vaccine. Tell your doctor if the person being vaccinated has any severe allergies. If the person scheduled for vaccination is not feeling well, your healthcare provider might decide to reschedule the shot on another day. Risks of a vaccine reaction  With any medicine, including vaccines, there is a chance of reactions. These are usually mild and go away on their own, but serious reactions are also possible. Problems reported following PCV13 varied by age and dose in the series.   The most common problems reported among children were:  · About half became drowsy after the shot, had a temporary loss of appetite, or had redness or tenderness where the shot was given. · About 1 out of 3 had swelling where the shot was given. · About 1 out of 3 had a mild fever, and about 1 in 20 had a fever over 102.2°F.  · Up to about 8 out of 10 became fussy or irritable. Adults have reported pain, redness, and swelling where the shot was given; also mild fever, fatigue, headache, chills, or muscle pain. [employer] children who get PCV13 along with inactivated flu vaccine at the same time may be at increased risk for seizures caused by fever. Ask your doctor for more information. Problems that could happen after any vaccine:  · People sometimes faint after a medical procedure, including vaccination. Sitting or lying down for about 15 minutes can help prevent fainting and the injuries caused by a fall. Tell your doctor if you feel dizzy or have vision changes or ringing in the ears. · Some older children and adults get severe pain in the shoulder and have difficulty moving the arm where a shot was given. This happens very rarely. · Any medication can cause a severe allergic reaction. Such reactions from a vaccine are very rare, estimated at about 1 in a million doses, and would happen within a few minutes to a few hours after the vaccination. As with any medicine, there is a very small chance of a vaccine causing a serious injury or death. The safety of vaccines is always being monitored. For more information, visit: www.cdc.gov/vaccinesafety. What if there is a serious reaction? What should I look for? · Look for anything that concerns you, such as signs of a severe allergic reaction, very high fever, or unusual behavior.   Signs of a severe allergic reaction can include hives, swelling of the face and throat, difficulty breathing, a fast heartbeat, dizziness, and weakness, usually within a few minutes to a few hours after the vaccination. What should I do? · If you think it is a severe allergic reaction or other emergency that can't wait, call 911 or get the person to the nearest hospital. Otherwise, call your doctor. · Reactions should be reported to the Vaccine Adverse Event Reporting System (VAERS). Your doctor should file this report, or you can do it yourself through the VAERS website at www.vaers. [facility].gov, or by calling 7-582.688.5519. VAERS does not give medical advice. The National Vaccine Injury Compensation Program  The National Vaccine Injury Compensation Program (VICP) is a federal program that was created to compensate people who may have been injured by certain vaccines. Persons who believe they may have been injured by a vaccine can learn about the program and about filing a claim by calling 8-540.161.9174 or visiting the [employer] website at www.[employer].gov/vaccinecompensation. There is a time limit to file a claim for compensation. How can I learn more? · Ask your healthcare provider. He or she can give you the vaccine package insert or suggest other sources of information. · Call your local or state health department. · Contact the Centers for Disease Control and Prevention (CDC):  ¨ Call 9-111.406.8361 (1-800-CDC-INFO) or  ¨ Visit CDC's website at www.cdc.gov/vaccines  Vaccine Information Statement  PCV13 Vaccine  11/5/2015  [id number]  Department of Health and Human Services  Centers for Disease Control and Prevention  Many Vaccine Information Statements are available in [place] and other languages. See www.immunize.org/vis. Muchas hojas de información sobre vacunas están disponibles en español y en otros idiomas. Visite www.immunize.org/vis.   Care instructions adapted under license by your healthcare professional. If you have questions about a medical condition or this instruction, always ask your healthcare professional. [address] any warranty or liability for your use of this information. Dilated Retinal Exam: About This Test  What is it? A dilated retinal exam lets your doctor see the inside of the back of your eye. To do the test, the doctor uses a light and a magnifying tool called an ophthalmoscope. Why is this test done? This test is done to look for eye problems and eye diseases. It also can be used to find other problems, such as head injuries or brain tumors. It is usually part of a regular eye exam. You may also have a vision test and a test for glaucoma. What happens during the test?  Tell your doctor if you or anyone in your family has glaucoma. And tell your doctor if you are allergic to any type of eyedrops. Your doctor will use eyedrops to widen (dilate) your pupils. This makes it easier to see the back of the eye. Your doctor may also use eyedrops to numb the surface of your eyes. It takes about 15 to 20 minutes to fully dilate the pupils. The dilating eyedrops may make your eyes sting. They may also cause a medicine taste in your mouth. When your pupils are dilated, your doctor will shine a bright light into your eyes and examine them. What happens after the test?  · Your vision will be blurry for several hours. · You will probably be able to go home or back to your usual activities right away. But your eyes will be sensitive. Wear sunglasses to protect them from the sun. · Do not drive for several hours after your eyes were dilated. When should you call for help? Watch closely for changes in your health, and be sure to contact your doctor if you have questions about the test.  Follow-up care is a key part of your treatment and safety. Be sure to make and go to all appointments, and call your doctor if you are having problems. It's also a good idea to keep a list of the medicines you take. Ask your doctor when you can expect to have your test results. Where can you learn more?   Go to http://aliyah-leigh.info/. Enter Y450 in the search box to learn more about \"Dilated Retinal Exam: About This Test.\"  Current as of: May 23, 2016  Content Version: 11.2  © 4816-6833 [employer]. Care instructions adapted under license by [employer] (which disclaims liability or warranty for this information). If you have questions about a medical condition or this instruction, always ask your healthcare professional. [place] [address] any warranty or liability for your use of this information.

## 2017-06-13 LAB
ALBUMIN SERPL-MCNC: 4 G/DL (ref 3.5–4.8)
ALBUMIN/GLOB SERPL: 1.5 {RATIO} (ref 1.2–2.2)
ALP SERPL-CCNC: 63 IU/L (ref 39–117)
ALT SERPL-CCNC: 16 IU/L (ref 0–44)
AST SERPL-CCNC: 17 IU/L (ref 0–40)
BILIRUB SERPL-MCNC: 0.3 MG/DL (ref 0–1.2)
BUN SERPL-MCNC: 13 MG/DL (ref 8–27)
BUN/CREAT SERPL: 15 (ref 10–24)
CALCIUM SERPL-MCNC: 9.1 MG/DL (ref 8.6–10.2)
CHLORIDE SERPL-SCNC: 100 MMOL/L (ref 96–106)
CO2 SERPL-SCNC: 25 MMOL/L (ref 18–29)
CREAT SERPL-MCNC: 0.85 MG/DL (ref 0.76–1.27)
EST. AVERAGE GLUCOSE BLD GHB EST-MCNC: 194 MG/DL
GLOBULIN SER CALC-MCNC: 2.7 G/DL (ref 1.5–4.5)
GLUCOSE SERPL-MCNC: 141 MG/DL (ref 65–99)
HBA1C MFR BLD: 8.4 % (ref 4.8–5.6)
POTASSIUM SERPL-SCNC: 4.1 MMOL/L (ref 3.5–5.2)
PROT SERPL-MCNC: 6.7 G/DL (ref 6–8.5)
SODIUM SERPL-SCNC: 142 MMOL/L (ref 134–144)

## 2017-07-17 DIAGNOSIS — L30.9 DERMATITIS: ICD-10-CM

## 2017-07-17 RX ORDER — CLOBETASOL PROPIONATE 0.5 MG/G
OINTMENT TOPICAL
Qty: 15 G | Refills: 0 | Status: SHIPPED | OUTPATIENT
Start: 2017-07-17 | End: 2017-08-23 | Stop reason: SDUPTHER

## 2017-07-28 DIAGNOSIS — K59.00 CONSTIPATION, UNSPECIFIED CONSTIPATION TYPE: ICD-10-CM

## 2017-07-28 RX ORDER — GLUCOSAMINE SULFATE 1500 MG
POWDER IN PACKET (EA) ORAL
Qty: 30 CAP | Refills: 5 | Status: SHIPPED | OUTPATIENT
Start: 2017-07-28 | End: 2018-02-08 | Stop reason: SDUPTHER

## 2017-07-28 RX ORDER — CETIRIZINE HYDROCHLORIDE, PSEUDOEPHEDRINE HYDROCHLORIDE 5; 120 MG/1; MG/1
TABLET, FILM COATED, EXTENDED RELEASE ORAL
Qty: 30 TAB | Refills: 3 | Status: SHIPPED | OUTPATIENT
Start: 2017-07-28 | End: 2017-12-07 | Stop reason: SDUPTHER

## 2017-08-15 RX ORDER — GLIPIZIDE 2.5 MG/1
TABLET, EXTENDED RELEASE ORAL
Qty: 30 TAB | Refills: 5 | Status: SHIPPED | OUTPATIENT
Start: 2017-08-15 | End: 2018-03-23 | Stop reason: SDUPTHER

## 2017-08-23 DIAGNOSIS — L30.9 DERMATITIS: ICD-10-CM

## 2017-08-23 DIAGNOSIS — M15.9 PRIMARY OSTEOARTHRITIS INVOLVING MULTIPLE JOINTS: ICD-10-CM

## 2017-08-23 RX ORDER — DICLOFENAC SODIUM 10 MG/G
GEL TOPICAL
Qty: 100 G | Refills: 3 | Status: SHIPPED | OUTPATIENT
Start: 2017-08-23 | End: 2017-12-29 | Stop reason: SDUPTHER

## 2017-08-23 RX ORDER — CLOBETASOL PROPIONATE 0.5 MG/G
OINTMENT TOPICAL
Qty: 15 G | Refills: 0 | Status: SHIPPED | OUTPATIENT
Start: 2017-08-23 | End: 2017-09-11

## 2017-09-05 ENCOUNTER — HOSPITAL ENCOUNTER (EMERGENCY)
Age: 77
Discharge: HOME | End: 2017-09-05
Attending: EMERGENCY MEDICINE

## 2017-09-05 ENCOUNTER — APPOINTMENT (OUTPATIENT)
Dept: GENERAL RADIOLOGY | Age: 77
End: 2017-09-05
Attending: EMERGENCY MEDICINE

## 2017-09-05 VITALS
BODY MASS INDEX: 25.3 KG/M2 | RESPIRATION RATE: 16 BRPM | SYSTOLIC BLOOD PRESSURE: 190 MMHG | OXYGEN SATURATION: 95 % | HEART RATE: 88 BPM | TEMPERATURE: 98.2 F | DIASTOLIC BLOOD PRESSURE: 84 MMHG | WEIGHT: 147.4 LBS

## 2017-09-05 DIAGNOSIS — M79.621 PAIN IN BOTH UPPER ARMS: ICD-10-CM

## 2017-09-05 DIAGNOSIS — M19.90 ARTHRITIS: ICD-10-CM

## 2017-09-05 DIAGNOSIS — M79.622 PAIN IN BOTH UPPER ARMS: ICD-10-CM

## 2017-09-05 DIAGNOSIS — M17.0 PRIMARY OSTEOARTHRITIS OF BOTH KNEES: ICD-10-CM

## 2017-09-05 DIAGNOSIS — M54.2 NECK PAIN: Primary | ICD-10-CM

## 2017-09-05 RX ORDER — TRAMADOL HYDROCHLORIDE 50 MG/1
50 TABLET ORAL
Qty: 15 TAB | Refills: 0 | Status: SHIPPED | OUTPATIENT
Start: 2017-09-05 | End: 2018-02-26

## 2017-09-05 NOTE — UC PROVIDER NOTE
Patient is a 65 y.o. male presenting with arm pain and neck pain. The history is provided by the patient and a relative. The history is limited by a language barrier (although son is fluent). A  was used (the patient asked that his son be the  for him). Arm Pain    This is a chronic (pain in his neck for a long time but increased for the past 4-5 days with some pain in his arms that seems better with heat. His taking his medications as he should) problem. The current episode started more than 2 days ago. The problem occurs constantly. The problem has been gradually worsening. Pain location: BL arms but rt more than left. The quality of the pain is described as aching. The pain is moderate. Associated symptoms include stiffness and neck pain. Pertinent negatives include no numbness and full range of motion. Treatments tried: home medications and heat. The treatment provided moderate relief. There has been no history of extremity trauma. Neck Pain    This is a chronic problem. The problem has been gradually worsening. The pain is associated with nothing. There has been no fever. The pain is present in the generalized neck. The pain is moderate. Exacerbated by: aching pain. Pertinent negatives include no numbness. Past Medical History:   Diagnosis Date    Chronic kidney disease     DJD (degenerative joint disease)     DM (diabetes mellitus) ([address]     type ll    HTN (hypertension)     Other and unspecified hyperlipidemia         History reviewed. No pertinent surgical history.       Family History   Problem Relation Age of Onset    Hypertension Mother       at 80 yo   [facility] Cancer Mother     Hypertension Father       at 80    Diabetes Sister    [facility] Cancer Son      colon cancer    Diabetes Brother      15 siblings, most with DM    Diabetes Son     No Known Problems Son     No Known Problems Son     No Known Problems Son     No Known Problems Son     No Known Problems Son     No Known Problems Daughter         Social History     Social History    Marital status:      Spouse name: N/A    Number of children: N/A    Years of education: N/A     Occupational History    Not on file. Social History Main Topics    Smoking status: Former Smoker     Packs/day: 2.00     Years: 20.00     Types: Cigarettes     Quit date: 1/1/1982    Smokeless tobacco: Never Used    Alcohol use No    Drug use: No    Sexual activity: Not on file     Other Topics Concern    Blood Transfusions No    Caffeine Concern No    Occupational Exposure No    Hobby Hazards No    Sleep Concern Yes     does not sleep well due to neuropathic pain    Stress Concern No    Weight Concern No    Special Diet No    Exercise No     unable due to pain in legs    Seat Belt Yes     Social History Narrative    Lives with son Isabel Leslie. Uses a cane. Wears glasses. Speaks Wolof. Has 7 sons and 1 daughter. ALLERGIES: Amoxicillin; Hydrocod-cpm-pe-acetaminophen; and Seafood [shellfish containing products]    Review of Systems   Constitutional: Negative. Respiratory: Negative. Negative for chest tightness and shortness of breath. Cardiovascular: Negative. Negative for palpitations and leg swelling. Musculoskeletal: Positive for neck pain, neck stiffness and stiffness. Neck aching and arms aching   Skin: Negative. Neurological: Negative. Negative for numbness. Vitals:    09/05/17 1817   BP: 190/84   Pulse: 88   Resp: 16   Temp: 98.2 °F (36.8 °C)   SpO2: 95%   Weight: 66.9 kg (147 lb 6.4 oz)       Physical Exam   Constitutional: He is oriented to person, place, and time. He appears well-developed and well-nourished. No distress. Agreeable and nontoxic, talkative, rubs the back of his neck and stretches his neck   HENT:   Head: Normocephalic and atraumatic.    Mouth/Throat: Oropharynx is clear and moist.   Eyes: Conjunctivae and EOM are normal.   Neck: Normal range of motion. Neck supple. Increased muscle tension in the posterior neck musculature with ROM preserved but somewhat stiff   Cardiovascular: Normal rate, regular rhythm and normal heart sounds. Pulmonary/Chest: Effort normal and breath sounds normal.   Musculoskeletal: Normal range of motion. He exhibits tenderness. He exhibits no edema or deformity. Some increased tenderness and increased tension on palpation of the BL trapezius and posterior shoulders   Lymphadenopathy:     He has no cervical adenopathy. Neurological: He is alert and oriented to person, place, and time. No cranial nerve deficit. Ambulatory and clear speech and intact coordination   Skin: Skin is warm and dry. No rash noted. No erythema. Psychiatric: He has a normal mood and affect. His behavior is normal. Judgment and thought content normal.   Nursing note and vitals reviewed. MDM     Differential Diagnosis; Clinical Impression; Plan:     CLINICAL IMPRESSION:  Neck pain  (primary encounter diagnosis)  Primary osteoarthritis of both knees  Pain in both upper arms  Arthritis    Plan:  1. tramadol  2. cspine  3. Close fu    Amount and/or Complexity of Data Reviewed:   Tests in the radiology section of CPT®:  Ordered and reviewed  Risk of Significant Complications, Morbidity, and/or Mortality:   Presenting problems: Moderate  Management options:   Moderate  Progress:   Patient progress:  Stable      Procedures

## 2017-09-05 NOTE — DISCHARGE INSTRUCTIONS
Arthritis: Care Instructions  Your Care Instructions  Arthritis, also called osteoarthritis, is a breakdown of the cartilage that cushions your joints. When the cartilage wears down, your bones rub against each other. This causes pain and stiffness. Many people have some arthritis as they age. Arthritis most often affects the joints of the spine, hands, hips, knees, or feet. You can take simple measures to protect your joints, ease your pain, and help you stay active. Follow-up care is a key part of your treatment and safety. Be sure to make and go to all appointments, and call your doctor if you are having problems. It's also a good idea to know your test results and keep a list of the medicines you take. How can you care for yourself at home? · Stay at a healthy weight. Being overweight puts extra strain on your joints. · Talk to your doctor or physical therapist about exercises that will help ease joint pain. ¨ Stretch. You may enjoy gentle forms of yoga to help keep your joints and muscles flexible. ¨ Walk instead of jog. Other types of exercise that are less stressful on the joints include riding a bicycle, swimming, mitchell chi, or water exercise. ¨ Lift weights. Strong muscles help reduce stress on your joints. Stronger thigh muscles, for example, take some of the stress off of the knees and hips. Learn the right way to lift weights so you do not make joint pain worse. · Take your medicines exactly as prescribed. Call your doctor if you think you are having a problem with your medicine. · Take pain medicines exactly as directed. ¨ If the doctor gave you a prescription medicine for pain, take it as prescribed. ¨ If you are not taking a prescription pain medicine, ask your doctor if you can take an over-the-counter medicine. · Use a cane, crutch, walker, or another device if you need help to get around. These can help rest your joints.  You also can use other things to make life easier, such as a higher toilet seat and padded handles on kitchen utensils. · Do not sit in low chairs, which can make it hard to get up. · Put heat or cold on your sore joints as needed. Use whichever helps you most. You also can take turns with hot and cold packs. ¨ Apply heat 2 or 3 times a day for 20 to 30 minutes--using a heating pad, hot shower, or hot pack--to relieve pain and stiffness. ¨ Put ice or a cold pack on your sore joint for 10 to 20 minutes at a time. Put a thin cloth between the ice and your skin. When should you call for help? Call your doctor now or seek immediate medical care if:  · You have sudden swelling, warmth, or pain in any joint. · You have joint pain and a fever or rash. · You have such bad pain that you cannot use a joint. Watch closely for changes in your health, and be sure to contact your doctor if:  · You have mild joint symptoms that continue even with more than 6 weeks of care at home. · You have stomach pain or other problems with your medicine. Where can you learn more? Go to http://aliyahAlaMarkaleigh.info/. Enter B176 in the search box to learn more about \"Arthritis: Care Instructions. \"  Current as of: November 28, 2016  Content Version: 11.3  © 6327-8222 [employer]. Care instructions adapted under license by [employer] (which disclaims liability or warranty for this information). If you have questions about a medical condition or this instruction, always ask your healthcare professional. [place] [address] any warranty or liability for your use of this information. Neck Pain: Care Instructions  Your Care Instructions  You can have neck pain anywhere from the bottom of your head to the top of your shoulders. It can spread to the upper back or arms. Injuries, painting a ceiling, sleeping with your neck twisted, staying in one position for too long, and many other activities can cause neck pain.   Most neck pain gets better with home care. Your doctor may recommend medicine to relieve pain or relax your muscles. He or she may suggest exercise and physical therapy to increase flexibility and relieve stress. You may need to wear a special (cervical) collar to support your neck for a day or two. Follow-up care is a key part of your treatment and safety. Be sure to make and go to all appointments, and call your doctor if you are having problems. It's also a good idea to know your test results and keep a list of the medicines you take. How can you care for yourself at home? · Try using a heating pad on a low or medium setting for 15 to 20 minutes every 2 or 3 hours. Try a warm shower in place of one session with the heating pad. · You can also try an ice pack for 10 to 15 minutes every 2 to 3 hours. Put a thin cloth between the ice and your skin. · Take pain medicines exactly as directed. ¨ If the doctor gave you a prescription medicine for pain, take it as prescribed. ¨ If you are not taking a prescription pain medicine, ask your doctor if you can take an over-the-counter medicine. · If your doctor recommends a cervical collar, wear it exactly as directed. When should you call for help? Call your doctor now or seek immediate medical care if:  · You have new or worsening numbness in your arms, buttocks or legs. · You have new or worsening weakness in your arms or legs. (This could make it hard to stand up.)  · You lose control of your bladder or bowels. Watch closely for changes in your health, and be sure to contact your doctor if:  · Your neck pain is getting worse. · You are not getting better after 1 week. · You do not get better as expected. Where can you learn more? Go to http://aliyah-leigh.info/. Enter [id number] in the search box to learn more about \"Neck Pain: Care Instructions. \"  Current as of: March 21, 2017  Content Version: 11.3  © 2684-8560 [employer].  Care instructions adapted under license by [employer] (which disclaims liability or warranty for this information). If you have questions about a medical condition or this instruction, always ask your healthcare professional. [address] any warranty or liability for your use of this information.

## 2017-09-11 ENCOUNTER — OFFICE VISIT (OUTPATIENT)
Dept: INTERNAL MEDICINE CLINIC | Age: 77
End: 2017-09-11

## 2017-09-11 VITALS
WEIGHT: 147 LBS | HEIGHT: 64 IN | RESPIRATION RATE: 20 BRPM | DIASTOLIC BLOOD PRESSURE: 72 MMHG | SYSTOLIC BLOOD PRESSURE: 161 MMHG | TEMPERATURE: 98.3 F | OXYGEN SATURATION: 97 % | BODY MASS INDEX: 25.1 KG/M2 | HEART RATE: 92 BPM

## 2017-09-11 DIAGNOSIS — E78.00 PURE HYPERCHOLESTEROLEMIA: ICD-10-CM

## 2017-09-11 DIAGNOSIS — M47.22 OSTEOARTHRITIS OF SPINE WITH RADICULOPATHY, CERVICAL REGION: Primary | ICD-10-CM

## 2017-09-11 DIAGNOSIS — I10 ESSENTIAL HYPERTENSION: ICD-10-CM

## 2017-09-11 DIAGNOSIS — E11.9 TYPE 2 DIABETES MELLITUS WITHOUT COMPLICATION, WITHOUT LONG-TERM CURRENT USE OF INSULIN (HCC): ICD-10-CM

## 2017-09-11 RX ORDER — MELOXICAM 15 MG/1
15 TABLET ORAL DAILY
Qty: 30 TAB | Refills: 5 | Status: SHIPPED | OUTPATIENT
Start: 2017-09-11 | End: 2017-11-06 | Stop reason: SDUPTHER

## 2017-09-11 RX ORDER — ACETAMINOPHEN 500 MG
500 TABLET ORAL
Qty: 60 TAB | Refills: 4 | Status: SHIPPED | OUTPATIENT
Start: 2017-09-11 | End: 2017-11-08 | Stop reason: SDUPTHER

## 2017-09-11 NOTE — PATIENT INSTRUCTIONS
Ways to Healthier Living    The best way to live healthy is to have a healthy lifestyle by eating a well-balanced diet, exercising regularly, limiting alcohol and stopping smoking. Regular physical exams and screening tests are another way to keep healthy. Preventive exams provided by your health care provider can find health problems before they become diseases or illnesses. Preventive services including immunizations, screening tests, monitoring and exams can help you take care of your own health. All people over age 72 should have a pneumovax and a prevnar shot to prevent pneumonia. These are once in a lifetime unless you and your provider decide differently. All people over 65 should have a yearly flu shot and a tetanus vaccine every 10 years. A bone mass density to screen for osteoporosis or thinning of the bones should be done every 2 years after 65. Screening for diabetes mellitus with a blood sugar test should be done every year. Glaucoma is a disease of the eye due to increased ocular pressure that can lead to blindness and it should be done every year by an eye professional.    Cardiovascular screening tests that check for elevated lipids (fatty part of blood) which can lead to heart disease and strokes should be done every 5 years. Colorectal screening that evaluates for blood or polyps in your colon should be done yearly as a stool test or every five years as a flexible sigmoidoscope or every 10 years as a colonoscopy up to age 76. Breast cancer screening with a mammogram is recommended biennially for women age 54-69. Screening for cervical cancer with a pap smear and pelvic exam is recommended for women after age 72 years every 2 years up to age 79 or when the provider and patient decide to stop. If there is a history of cervical abnormalities or other increased risk for cancer then the test is recommended yearly.     Hepatitis C screening is also recommended for anyone born between 80 through [address]. A shingles vaccine is also recommended once in a lifetime after age 61. Your Medicare Wellness Exam is recommended annually. Here is a list of your current Health Maintenance items with due dates:    Medicare Part B Preventive Services Limitations Recommendation Scheduled   Cardiovascular Screening Blood Tests (every 5 years)  Total cholesterol, HDL, Triglycerides Order as a panel if possible Completed:  10/10/16    As recommended by your PCP As recommended by your PCP   Colorectal Cancer Screening  -Fecal occult blood test (annual)  -Flexible sigmoidoscopy (5y)  -Screening colonoscopy (10y)  -Barium Enema Age 49-80; after age [de-identified] if history of abnormal results Completed:  2/24/14      Recommended every 5 to every 10 years As recommenced by your PCP/Specialist   Counseling to Prevent Tobacco Use (up to 8 sessions per year)  - Counseling greater than 3 and up to 10 minutes  - Counseling greater than 10 minutes Patients must be asymptomatic of tobacco-related conditions to receive as preventive service N/A N/A   Diabetes Screening Tests (at least every 3 years, Medicare covers annually or at 6-month intervals for prediabetic patients)    Fasting blood sugar (FBS) or glucose tolerance test (GTT)         Patient must be diagnosed with one of the following:  -Hypertension, Dyslipidemia, obesity, previous impaired FBS or GTT  Or any two of the following: overweight, FH of diabetes, age ? 65 Completed:  N/A      Recommended every 3 years for non-diabetics. Recommended every 3-6 moths for Pre-Diabetics and Diabetes  Due Per PCP   Diabetes Self-Management Training (DSMT) (no USPSTF recommendation) Requires referral by treating physician for patient with diabetes or renal disease. 10 hours of initial DSMT session of no less than 30 minutes each in a continuous 12-month period. 2 hours of follow-up DSMT in subsequent years.      Glaucoma Screening (no USPSTF recommendation) Diabetes mellitus, family history, , age 48 or over,  American, age 72 or over Completed:     2/14/15    Recommended 1-2 years DUE: Now   Medical Nutrition Therapy (MNT) (for diabetes or renal disease not recommended schedule)   Requires referral by treating physician for patient with diabetes or renal disease. Can be provided in same year as diabetes self-management training (DSMT), and CMS recommends medical nutrition therapy take place after DSMT. Up to 3 hours for initial year and 2 hours in subsequent years. Prostate Cancer Screening (annually up to age 76)  - Digital rectal exam (HARSHIL)  - Prostate specific antigen (PSA) Annually (age 48 or over), HARSHIL not paid separately when covered E/M service is provided on same date Completed:        Recommended annually to age 76. As recommended by your specialist or PCP. Seasonal Influenza Vaccination (annually)  Completed:  9/13/16     Due: Fall 2017 annually   Pneumococcal Vaccination (once after 65)  Completed:  10/29/14 Due: completed   TDAP Vaccine    Completed:  6/12/17(N/I)  Recommenced   every 10 years Due: complete   Shingles Vaccine    Completed:    Recommended once over the age 48. Due:     Prevnar 13 Vaccine    Completed:  Never  Recommenced once over the age of 72. Due:   Hepatitis B Vaccinations (if medium/high risk) Medium/high risk factors:  End-stage renal disease,  Hemophiliacs who received Factor VIII or IX concentrates, Clients of institutions for the mentally retarded, Persons who live in the same house as a HepB virus carrier, Homosexual men, Illicit injectable drug abusers. As Recommended by your specialist or PCP. Ultrasound Screening for A b dominal Aortic Aneurysm (AAA) (once) Patient must be referred through IPPE and not have had a screening for abdominal aortic aneurysm before under Medicare.   Limited to patients who meet one of the following criteria:  - Men who are 73-68 years old and have smoked more than 100 cigarettes in their lifetime.  -Anyone with a FH of AAA  -Anyone recommended for screening by USPSTF   Not covered by Medicare as preventive. Neck Arthritis: Exercises  Your Care Instructions  Here are some examples of typical rehabilitation exercises for your condition. Start each exercise slowly. Ease off the exercise if you start to have pain. Your doctor or physical therapist will tell you when you can start these exercises and which ones will work best for you. How to do the exercises  Neck stretches to the side    1. This stretch works best if you keep your shoulder down as you lean away from it. To help you remember to do this, start by relaxing your shoulders and lightly holding on to your thighs or your chair. 2. Tilt your head toward your shoulder and hold for 15 to 30 seconds. Let the weight of your head stretch your muscles. 3. Repeat 2 to 4 times toward each shoulder. Chin tuck    1. Lie on the floor with a rolled-up towel under your neck. Your head should be touching the floor. 2. Slowly bring your chin toward your chest.  3. Hold for a count of 6, and then relax for up to 10 seconds. 4. Repeat 8 to 12 times. Active cervical rotation    1. Sit in a firm chair, or stand up straight. 2. Keeping your chin level, turn your head to the right, and hold for 15 to 30 seconds. 3. Turn your head to the left and hold for 15 to 30 seconds. 4. Repeat 2 to 4 times to each side. Shoulder blade squeeze    1. While standing, squeeze your shoulder blades together. 2. Do not raise your shoulders up as you are squeezing. 3. Hold for 6 seconds. 4. Repeat 8 to 12 times. Shoulder rolls    1. Sit comfortably with your feet shoulder-width apart. You can also do this exercise standing up. 2. Roll your shoulders up, then back, and then down in a smooth, circular motion. 3. Repeat 2 to 4 times. Follow-up care is a key part of your treatment and safety.  Be sure to make and go to all appointments, and call your doctor if you are having problems. It's also a good idea to know your test results and keep a list of the medicines you take. Where can you learn more? Go to http://aliyah-leigh.info/. Enter F270 in the search box to learn more about \"Neck Arthritis: Exercises. \"  Current as of: March 21, 2017  Content Version: 11.3  © 3675-0360 [employer]. Care instructions adapted under license by [employer] (which disclaims liability or warranty for this information). If you have questions about a medical condition or this instruction, always ask your healthcare professional. [address] any warranty or liability for your use of this information.

## 2017-09-11 NOTE — PROGRESS NOTES
Health Maintenance Due   Topic Date Due    ZOSTER VACCINE AGE 60>  11/07/1998    Pneumococcal 65+ Low/Medium Risk (2 of 2 - PCV13) 10/29/2015    EYE EXAM RETINAL OR DILATED Q1  02/14/2016    GLAUCOMA SCREENING Q2Y  02/14/2017    INFLUENZA AGE 9 TO ADULT  08/01/2017    MICROALBUMIN Q1  10/10/2017    LIPID PANEL Q1  10/10/2017       Chief Complaint   Patient presents with    ED Follow-up     [facility] 9/5/2017 for arm and neck pain       1. Have you been to the ER, urgent care clinic since your last visit? Hospitalized since your last visit? Yes When: 9/5/2017 Where:  Reason for visit: neck and arm pain    2. Have you seen or consulted any other health care providers outside of the [facility] since your last visit? Include any pap smears or colon screening. No    3) Do you have an Advance Directive on file? no    4) Are you interested in receiving information on Advance Directives? NO      Patient is accompanied by son I have received verbal consent from [address] [place] [address] to discuss any/all medical information while they are present in the room.

## 2017-09-11 NOTE — MR AVS SNAPSHOT
Visit Information Date & Time Provider Department Dept. Phone Encounter #  
 9/11/2017  1:45 PM Lisa Carlos, [address] [facility] Internal Medicine [phone] [id number] Your Appointments 10/23/2017  8:45 AM  
ROUTINE CARE with Lisa Carlos MD  
[facility] Internal Medicine [address]) Appt Note: 4 month followup [address] [facility] [address]  
[phone]  
  
   
 [facility] [address] Upcoming Health Maintenance Date Due ZOSTER VACCINE AGE 60> 11/7/1998 Pneumococcal 65+ Low/Medium Risk (2 of 2 - PCV13) 10/29/2015 EYE EXAM RETINAL OR DILATED Q1 2/14/2016 GLAUCOMA SCREENING Q2Y 2/14/2017 INFLUENZA AGE 9 TO ADULT 8/1/2017 MICROALBUMIN Q1 10/10/2017 LIPID PANEL Q1 10/10/2017 HEMOGLOBIN A1C Q6M 12/12/2017 FOOT EXAM Q1 1/18/2018 MEDICARE YEARLY EXAM 6/13/2018 DTaP/Tdap/Td series (2 - Td) 6/12/2027 Allergies as of 9/11/2017  Review Complete On: 9/11/2017 By: Lisa Carlos MD  
  
 Severity Noted Reaction Type Reactions Amoxicillin  02/23/2012   Systemic Nausea and Vomiting Hydrocod-cpm-pe-acetaminophen  03/19/2010    Other (comments) GI Upset Seafood [Shellfish Containing Products]  04/20/2012   Topical Rash Current Immunizations  Reviewed on 11/20/2014 Name Date Influenza Vaccine 11/18/2014,  Incomplete Influenza Vaccine Split 11/2/2012, 9/27/2011, 10/14/2010 Pneumococcal Polysaccharide (PPSV-23) 10/29/2014 Not reviewed this visit You Were Diagnosed With   
  
 Codes Comments Osteoarthritis of spine with radiculopathy, cervical region    -  Primary ICD-10-CM: M47.22 
ICD-9-CM: 721.0 Type 2 diabetes mellitus without complication, without long-term current use of insulin (HCC)     ICD-10-CM: E11.9 ICD-9-CM: 250.00 Essential hypertension     ICD-10-CM: I10 
ICD-9-CM: 401.9 Pure hypercholesterolemia     ICD-10-CM: E78.00 ICD-9-CM: 272.0 Vitals BP Pulse Temp Resp Height(growth percentile) Weight(growth percentile) 161/72 (BP 1 Location: Left arm, BP Patient Position: Sitting) 92 98.3 °F (36.8 °C) (Oral) 20 5' 4\" (1.626 m) 147 lb (66.7 kg) SpO2 BMI Smoking Status 97% 25.23 kg/m2 Former Smoker Vitals History BMI and BSA Data Body Mass Index Body Surface Area  
 25.23 kg/m 2 1.74 m 2 Preferred Pharmacy Pharmacy Name Phone [id number] Rodriguez, [address] [phone] Your Updated Medication List  
  
   
This list is accurate as of: 9/11/17  2:12 PM.  Always use your most recent med list.  
  
  
  
  
 acetaminophen 500 mg tablet Commonly known as:  TYLENOL Take 1 Tab by mouth two (2) times daily as needed. aspirin 81 mg chewable tablet Take 1 Tab by mouth every other day. cholecalciferol 1,000 unit Cap Commonly known as:  VITAMIN D3  
TAKE ONE CAPSULE BY MOUTH EVERY DAY  
  
 diclofenac 1 % Gel Commonly known as:  VOLTAREN  
APPLY 2 GRAMS TO AFFECTED AREA TWO (2) TIMES A DAY. fluticasone 50 mcg/actuation nasal spray Commonly known as:  Marine Knack INHALE 1 SPRAY EACH NOSTRIL AT BEDTIME  
  
 glipiZIDE SR 2.5 mg CR tablet Commonly known as:  GLUCOTROL XL  
TAKE 1 TABLET BY MOUTH EVERY DAY  
  
 hydrocortisone 2.5 % lotion Commonly known as:  HYTONE  
APPLY A THIN LAYER TO AFFECTED AREA TWICE DAILY Iron 325 mg (65 mg iron) tablet Generic drug:  ferrous sulfate TAKE 1 TABLET BY MOUTH DAILY (BEFORE BREAKFAST). JANUMET XR 50-1,000 mg Tm24 Generic drug:  SITagliptin-metFORMIN  
TAKE 2 TABLETS BY MOUTH EVERY DAY  
  
 lisinopril 10 mg tablet Commonly known as:  PRINIVIL, ZESTRIL  
TAKE 1 TABLET BY MOUTH DAILY DISCONTINUE HCTZ  
  
 meloxicam 15 mg tablet Commonly known as:  MOBIC Take 1 Tab by mouth daily. OTHER  
500 mg. [facility] pain relief SENNA PLUS 8.6-50 mg per tablet Generic drug:  senna-docusate TAKE 1 TAB BY MOUTH DAILY. simvastatin 10 mg tablet Commonly known as:  ZOCOR Take 1 Tab by mouth nightly. SYSTANE (PROPYLENE GLYCOL) 0.4-0.3 % Drop Generic drug:  peg 400-propylene glycol Administer 1 Drop to left eye as needed. tamsulosin 0.4 mg capsule Commonly known as:  FLOMAX TAKE ONE CAPSULE BY MOUTH EVERY DAY  
  
 traMADol 50 mg tablet Commonly known as:  ULTRAM  
Take 1 Tab by mouth daily as needed for Pain.  
  
 triamcinolone 55 mcg nasal inhaler Commonly known as:  NASACORT AQ  
1 [place] by Both Nostrils route nightly. Prescriptions Sent to Pharmacy Refills  
 meloxicam (MOBIC) 15 mg tablet 5 Sig: Take 1 Tab by mouth daily. Class: Normal  
 Pharmacy: [facility]/pharmacy #[id number] Ph #: [phone] Route: Oral  
 acetaminophen (TYLENOL) 500 mg tablet 4 Sig: Take 1 Tab by mouth two (2) times daily as needed. Class: Normal  
 Pharmacy: [facility]/pharmacy #[id number] Ph #: [phone] Route: Oral  
  
We Performed the Following [address] [place] [address] Comments:  
 Please evaluate patient for PT for neck pain with radiation Referral Information Referral ID Referred By Referred To  
  
 [id number] Roddy Pulliam Not Available Visits Status Start Date End Date 1 New Request 9/11/17 9/11/18 If your referral has a status of pending review or denied, additional information will be sent to support the outcome of this decision. Patient Instructions Ways to Healthier Living The best way to live healthy is to have a healthy lifestyle by eating a well-balanced diet, exercising regularly, limiting alcohol and stopping smoking. Regular physical exams and screening tests are another way to keep healthy.  Preventive exams provided by your health care provider can find health problems before they become diseases or illnesses. Preventive services including immunizations, screening tests, monitoring and exams can help you take care of your own health. All people over age 72 should have a pneumovax and a prevnar shot to prevent pneumonia. These are once in a lifetime unless you and your provider decide differently. All people over 65 should have a yearly flu shot and a tetanus vaccine every 10 years. A bone mass density to screen for osteoporosis or thinning of the bones should be done every 2 years after 65. Screening for diabetes mellitus with a blood sugar test should be done every year. Glaucoma is a disease of the eye due to increased ocular pressure that can lead to blindness and it should be done every year by an eye professional. 
 
Cardiovascular screening tests that check for elevated lipids (fatty part of blood) which can lead to heart disease and strokes should be done every 5 years. Colorectal screening that evaluates for blood or polyps in your colon should be done yearly as a stool test or every five years as a flexible sigmoidoscope or every 10 years as a colonoscopy up to age 76. Breast cancer screening with a mammogram is recommended biennially for women age 54-69. Screening for cervical cancer with a pap smear and pelvic exam is recommended for women after age 72 years every 2 years up to age 79 or when the provider and patient decide to stop. If there is a history of cervical abnormalities or other increased risk for cancer then the test is recommended yearly. Hepatitis C screening is also recommended for anyone born between 80 through [address]. A shingles vaccine is also recommended once in a lifetime after age 61. Your Medicare Wellness Exam is recommended annually. Here is a list of your current Health Maintenance items with due dates: 
 
Medicare Part B Preventive Services Limitations Recommendation Scheduled Cardiovascular Screening Blood Tests (every 5 years) Total cholesterol, HDL, Triglycerides Order as a panel if possible Completed: 
10/10/16 As recommended by your PCP As recommended by your PCP Colorectal Cancer Screening 
-Fecal occult blood test (annual) -Flexible sigmoidoscopy (5y) 
-Screening colonoscopy (10y) -Barium Enema Age 49-80; after age [de-identified] if history of abnormal results Completed: 
2/24/14 Recommended every 5 to every 10 years As recommenced by your PCP/Specialist  
Counseling to Prevent Tobacco Use (up to 8 sessions per year) - Counseling greater than 3 and up to 10 minutes - Counseling greater than 10 minutes Patients must be asymptomatic of tobacco-related conditions to receive as preventive service N/A N/A Diabetes Screening Tests (at least every 3 years, Medicare covers annually or at 6-month intervals for prediabetic patients) Fasting blood sugar (FBS) or glucose tolerance test (GTT) Patient must be diagnosed with one of the following: 
-Hypertension, Dyslipidemia, obesity, previous impaired FBS or GTT 
Or any two of the following: overweight, FH of diabetes, age ? 65 Completed: N/A Recommended every 3 years for non-diabetics. Recommended every 3-6 moths for Pre-Diabetics and Diabetes  Due Per PCP Diabetes Self-Management Training (DSMT) (no USPSTF recommendation) Requires referral by treating physician for patient with diabetes or renal disease. 10 hours of initial DSMT session of no less than 30 minutes each in a continuous 12-month period. 2 hours of follow-up DSMT in subsequent years. Glaucoma Screening (no USPSTF recommendation) Diabetes mellitus, family history, , age 48 or over,  American, age 72 or over Completed: 
 
 2/14/15 Recommended 1-2 years DUE: Now  
Medical Nutrition Therapy (MNT) (for diabetes or renal disease not recommended schedule) Requires referral by treating physician for patient with diabetes or renal disease. Can be provided in same year as diabetes self-management training (DSMT), and CMS recommends medical nutrition therapy take place after DSMT. Up to 3 hours for initial year and 2 hours in subsequent years. Prostate Cancer Screening (annually up to age 76) - Digital rectal exam (HARSHIL) - Prostate specific antigen (PSA) Annually (age 48 or over), HARSHIL not paid separately when covered E/M service is provided on same date Completed: 
 
 
 
Recommended annually to age 76. As recommended by your specialist or PCP. Seasonal Influenza Vaccination (annually)  Completed: 
9/13/16 Due: Fall 2017 annually Pneumococcal Vaccination (once after 65)  Completed: 
10/29/14 Due: completed TDAP Vaccine Completed: 
6/12/17(N/I) Recommenced  
every 10 years Due: complete Shingles Vaccine Completed: 
 
Recommended once over the age 48. Due:  
 
Prevnar 13 Vaccine Completed: 
Never Recommenced once over the age of 72. Due:  
Hepatitis B Vaccinations (if medium/high risk) Medium/high risk factors:  End-stage renal disease, Hemophiliacs who received Factor VIII or IX concentrates, Clients of institutions for the mentally retarded, Persons who live in the same house as a HepB virus carrier, Homosexual men, Illicit injectable drug abusers. As Recommended by your specialist or PCP. Ultrasound Screening for A b dominal Aortic Aneurysm (AAA) (once) Patient must be referred through IPPE and not have had a screening for abdominal aortic aneurysm before under Medicare. Limited to patients who meet one of the following criteria: 
- Men who are 73-68 years old and have smoked more than 100 cigarettes in their lifetime. 
-Anyone with a FH of AAA 
-Anyone recommended for screening by USPSTF Not covered by Medicare as preventive. Neck Arthritis: Exercises Your Care Instructions Here are some examples of typical rehabilitation exercises for your condition. Start each exercise slowly. Ease off the exercise if you start to have pain. Your doctor or physical therapist will tell you when you can start these exercises and which ones will work best for you. How to do the exercises Neck stretches to the side 1. This stretch works best if you keep your shoulder down as you lean away from it. To help you remember to do this, start by relaxing your shoulders and lightly holding on to your thighs or your chair. 2. Tilt your head toward your shoulder and hold for 15 to 30 seconds. Let the weight of your head stretch your muscles. 3. Repeat 2 to 4 times toward each shoulder. Chin tuck 1. Lie on the floor with a rolled-up towel under your neck. Your head should be touching the floor. 2. Slowly bring your chin toward your chest. 
3. Hold for a count of 6, and then relax for up to 10 seconds. 4. Repeat 8 to 12 times. Active cervical rotation 1. Sit in a firm chair, or stand up straight. 2. Keeping your chin level, turn your head to the right, and hold for 15 to 30 seconds. 3. Turn your head to the left and hold for 15 to 30 seconds. 4. Repeat 2 to 4 times to each side. Shoulder blade squeeze 1. While standing, squeeze your shoulder blades together. 2. Do not raise your shoulders up as you are squeezing. 3. Hold for 6 seconds. 4. Repeat 8 to 12 times. Shoulder rolls 1. Sit comfortably with your feet shoulder-width apart. You can also do this exercise standing up. 2. Roll your shoulders up, then back, and then down in a smooth, circular motion. 3. Repeat 2 to 4 times. Follow-up care is a key part of your treatment and safety. Be sure to make and go to all appointments, and call your doctor if you are having problems. It's also a good idea to know your test results and keep a list of the medicines you take. Where can you learn more? Go to http://aliyah-leigh.info/. Enter A610 in the search box to learn more about \"Neck Arthritis: Exercises. \" Current as of: March 21, 2017 Content Version: 11.3 © 9995-2409 [employer]. Care instructions adapted under license by [employer] (which disclaims liability or warranty for this information). If you have questions about a medical condition or this instruction, always ask your healthcare professional. [address] any warranty or liability for your use of this information. Introducing [facility]! Dear Dayana Moseley: 
Thank you for requesting a InTown account. Our records indicate that you have previously registered for a InTown account but its currently inactive. Please call our InTown support line at [phone]. Additional Information If you have questions, please visit the Frequently Asked Questions section of the InTown website at [email or web address] Remember, InTown is NOT to be used for urgent needs. For medical emergencies, dial 911. Now available from your iPhone and Android! Please provide this summary of care documentation to your next provider. Your primary care clinician is listed as Cindy Lowry. If you have any questions after today's visit, please call [phone].

## 2017-09-11 NOTE — PROGRESS NOTES
HISTORY OF PRESENT ILLNESS  Cecilio Pruitt is a 65 y.o. male here for follow up  visit. Reports neck pain radiates to her arms. xray c spine shows spondylosis. /on mobic 7.5 mg pO QD. Pain is not controlled. Got tramadol PRn from . He is seen for diabetes. Minor Ronde He reports medication compliance: compliant all of the time. Diabetic diet compliance: compliant most of the time. Home glucose monitoring: is performed sporadically, values are usually normal. Further diabetic ROS: no polyuria or polydipsia, no chest pain, dyspnea or TIA's, no numbness, tingling or pain in extremities, no hypoglycemia. Lab review: labs are reviewed, up to date and normal, labs reviewed and discussed with patient. Eye exam: up to date. Reports pain in lower back. on voltarin gel. Has HTN,BP is stable. Need med refill. ED Follow-up   Pertinent negatives include no chest pain. Neck Pain   Pertinent negatives include no chest pain. Diabetes   Pertinent negatives include no chest pain. Follow-up   Pertinent negatives include no chest pain. Hypertension    Associated symptoms include neck pain. Pertinent negatives include no chest pain, no palpitations and no blurred vision. Review of Systems   Constitutional: Negative. Negative for chills and fever. HENT: Negative. Eyes: Negative. Negative for blurred vision and double vision. Respiratory: Negative. Cardiovascular: Negative. Negative for chest pain and palpitations. Gastrointestinal: Negative. Genitourinary: Negative. Musculoskeletal: Positive for joint pain and neck pain. Skin: Negative. Neurological: Negative. Psychiatric/Behavioral: Negative. Physical Exam   Constitutional: He appears well-developed and well-nourished. No distress. Neck: Normal range of motion. Neck supple. No JVD present. No thyromegaly present. Cardiovascular: Normal rate, regular rhythm, normal heart sounds and intact distal pulses.     Pulmonary/Chest: Effort normal and breath sounds normal. No respiratory distress. He has no wheezes. He has no rales. Musculoskeletal: He exhibits tenderness. He exhibits no edema. Neck;spine NT ROM OK. spurling sign +ve   Psychiatric: He has a normal mood and affect. His behavior is normal.       ASSESSMENT and PLAN    Diagnoses and all orders for this visit:    1. Osteoarthritis of spine with radiculopathy, cervical region    Will increase,  -     meloxicam (MOBIC) 15 mg tablet; Take 1 Tab by mouth daily.  -     REFERRAL TO HOME HEALTH  -     acetaminophen (TYLENOL) 500 mg tablet; Take 1 Tab by mouth two (2) times daily as needed. 2. Type 2 diabetes mellitus without complication, without long-term current use of insulin (HCC)  On glipizide Xr. Will refill,  -     SITagliptin-metFORMIN (JANUMET XR) 50-1,000 mg TM24; 2 tab po QD    3. Essential hypertension    On lisinopril stable. 4. Pure hypercholesterolemia    Stable. Issues reviewed with her: referral to Diabetic Education department, diabetic diet discussed in detail, written exchange diet given, home glucose monitoring emphasized, all medications, side effects and compliance discussed carefully, foot care discussed and Podiatry visits discussed, annual eye examinations at Ophthalmology discussed, long term diabetic complications discussed and labs immediately prior to next visit.

## 2017-09-20 RX ORDER — TAMSULOSIN HYDROCHLORIDE 0.4 MG/1
CAPSULE ORAL
Qty: 90 CAP | Refills: 1 | Status: SHIPPED | OUTPATIENT
Start: 2017-09-20 | End: 2018-03-31 | Stop reason: SDUPTHER

## 2017-10-23 ENCOUNTER — OFFICE VISIT (OUTPATIENT)
Dept: INTERNAL MEDICINE CLINIC | Age: 77
End: 2017-10-23

## 2017-10-23 VITALS
DIASTOLIC BLOOD PRESSURE: 76 MMHG | HEART RATE: 86 BPM | SYSTOLIC BLOOD PRESSURE: 155 MMHG | WEIGHT: 148 LBS | OXYGEN SATURATION: 98 % | HEIGHT: 64 IN | BODY MASS INDEX: 25.27 KG/M2 | RESPIRATION RATE: 18 BRPM

## 2017-10-23 DIAGNOSIS — E78.00 PURE HYPERCHOLESTEROLEMIA: ICD-10-CM

## 2017-10-23 DIAGNOSIS — Z23 ENCOUNTER FOR IMMUNIZATION: ICD-10-CM

## 2017-10-23 DIAGNOSIS — I10 ESSENTIAL HYPERTENSION: ICD-10-CM

## 2017-10-23 DIAGNOSIS — E11.9 TYPE 2 DIABETES MELLITUS WITHOUT COMPLICATION, WITHOUT LONG-TERM CURRENT USE OF INSULIN (HCC): Primary | ICD-10-CM

## 2017-10-23 RX ORDER — GUAIFENESIN 100 MG/5ML
81 LIQUID (ML) ORAL EVERY OTHER DAY
Qty: 30 TAB | Refills: 12 | Status: SHIPPED | OUTPATIENT
Start: 2017-10-23 | End: 2017-11-06 | Stop reason: SDUPTHER

## 2017-10-23 NOTE — PROGRESS NOTES
HISTORY OF PRESENT ILLNESS  Harpreet Rojo is a 65 y.o. male here for follow up . He is seen for diabetes. Edgar Erazo He reports medication compliance: compliant all of the time. Diabetic diet compliance: compliant most of the time. Home glucose monitoring: is performed sporadically, values are usually normal. Further diabetic ROS: no polyuria or polydipsia, no chest pain, dyspnea or TIA's, no numbness, tingling or pain in extremities, no hypoglycemia. Lab review: labs are reviewed, up to date and normal, labs reviewed and discussed with patient. Eye exam: up to date. Reports pain in lower back. on voltarin gel. Has HTN,BP is stable. Need med refill. Need flu shot. Hypertension    Pertinent negatives include no chest pain, no palpitations and no blurred vision. Diabetes   Pertinent negatives include no chest pain. Cholesterol Problem   Pertinent negatives include no chest pain. Follow-up   Pertinent negatives include no chest pain. Review of Systems   Constitutional: Negative. Negative for chills and fever. HENT: Negative. Eyes: Negative. Negative for blurred vision and double vision. Respiratory: Negative. Cardiovascular: Negative. Negative for chest pain and palpitations. Gastrointestinal: Negative. Genitourinary: Negative. Musculoskeletal: Negative. Skin: Negative. Neurological: Negative. Psychiatric/Behavioral: Negative. Physical Exam   Constitutional: He appears well-developed and well-nourished. No distress. Neck: Normal range of motion. Neck supple. No JVD present. No thyromegaly present. Cardiovascular: Normal rate, regular rhythm, normal heart sounds and intact distal pulses. Pulmonary/Chest: Effort normal and breath sounds normal. No respiratory distress. He has no wheezes. Musculoskeletal: He exhibits tenderness. He exhibits no edema. Psychiatric: He has a normal mood and affect.  His behavior is normal.       ASSESSMENT and PLAN    Diagnoses and all orders for this visit:    1. Type 2 diabetes mellitus without complication, without long-term current use of insulin ([facility])    Will do,  -     REFERRAL TO OPHTHALMOLOGY  -     METABOLIC PANEL, COMPREHENSIVE  -     HEMOGLOBIN A1C WITH EAG  -     MICROALBUMIN, UR, RAND    2. Essential hypertension  Stable. On med. Will do,  -     METABOLIC PANEL, COMPREHENSIVE  -     aspirin 81 mg chewable tablet; Take 1 Tab by mouth every other day. 3. Pure hypercholesterolemia  On statin. Will do,  -     METABOLIC PANEL, COMPREHENSIVE    4. Encounter for immunization  Will do,  -     pneumococcal 13 taco conj dip (PREVNAR-13) 0.5 mL syrg injection; 0.5 mL by IntraMUSCular route once for 1 dose. -     INFLUENZA VIRUS VACCINE, HIGH DOSE SEASONAL, PRESERVATIVE FREE            Issues reviewed with her: referral to Diabetic Education department, diabetic diet discussed in detail, written exchange diet given, home glucose monitoring emphasized, all medications, side effects and compliance discussed carefully, foot care discussed and Podiatry visits discussed, annual eye examinations at Ophthalmology discussed, long term diabetic complications discussed and labs immediately prior to next visit.

## 2017-10-23 NOTE — LETTER
10/31/2017 10:20 AM 
 
Mr. Valeriano [id number] Dear Shabbir Adjutant: 
 
Please find your most recent results below. Resulted Orders METABOLIC PANEL, COMPREHENSIVE Result Value Ref Range Glucose 155 (H) 65 - 99 mg/dL BUN 14 8 - 27 mg/dL Creatinine 0.86 0.76 - 1.27 mg/dL GFR est non-AA 83 >59 mL/min/1.73 GFR est AA 96 >59 mL/min/1.73  
 BUN/Creatinine ratio 16 10 - 24 Sodium 142 134 - 144 mmol/L Potassium 4.1 3.5 - 5.2 mmol/L Chloride 102 96 - 106 mmol/L  
 CO2 28 18 - 29 mmol/L Calcium 9.2 8.6 - 10.2 mg/dL Protein, total 6.9 6.0 - 8.5 g/dL Albumin 4.2 3.5 - 4.8 g/dL GLOBULIN, TOTAL 2.7 1.5 - 4.5 g/dL A-G Ratio 1.6 1.2 - 2.2 Bilirubin, total 0.3 0.0 - 1.2 mg/dL Alk. phosphatase 55 39 - 117 IU/L  
 AST (SGOT) 21 0 - 40 IU/L  
 ALT (SGPT) 17 0 - 44 IU/L Narrative Performed at:  [address]  [id number] : Viky Rodriguez MD, Phone:  [phone] HEMOGLOBIN A1C WITH EAG Result Value Ref Range Hemoglobin A1c 7.5 (H) 4.8 - 5.6 % Comment:  
            Pre-diabetes: 5.7 - 6.4 Diabetes: >6.4 Glycemic control for adults with diabetes: <7.0 Estimated average glucose 169 mg/dL Narrative Performed at:  [address]  [id number] : Viky Rodriguez MD, Phone:  [phone] MICROALBUMIN, UR, RAND Result Value Ref Range Microalbumin, urine 9.8 Not Estab. ug/mL Narrative Performed at:  [address]  [id number] : Viky Rodriguez MD, Phone:  [phone] RECOMMENDATIONS: 
Shabbir Adjutant your recent labs indicate that your diabetes is better, please continue on a diabetic diet and exercise as tolerated. All other labs are stable Please call me if you have any questions: [phone] Sincerely, Elayne Soulier, MD

## 2017-10-23 NOTE — PATIENT INSTRUCTIONS

## 2017-10-23 NOTE — MR AVS SNAPSHOT
Visit Information Date & Time Provider Department Dept. Phone Encounter #  
 10/23/2017  8:45 AM Idalia Domingo, [address] [facility] Internal Medicine [phone] [id number] Upcoming Health Maintenance Date Due ZOSTER VACCINE AGE 60> 11/7/1998 Pneumococcal 65+ Low/Medium Risk (2 of 2 - PCV13) 10/29/2015 EYE EXAM RETINAL OR DILATED Q1 2/14/2016 GLAUCOMA SCREENING Q2Y 2/14/2017 INFLUENZA AGE 9 TO ADULT 8/1/2017 MICROALBUMIN Q1 10/10/2017 LIPID PANEL Q1 10/10/2017 HEMOGLOBIN A1C Q6M 12/12/2017 FOOT EXAM Q1 1/18/2018 MEDICARE YEARLY EXAM 6/13/2018 DTaP/Tdap/Td series (2 - Td) 6/12/2027 Allergies as of 10/23/2017  Review Complete On: 10/23/2017 By: Idalia Domingo MD  
  
 Severity Noted Reaction Type Reactions Amoxicillin  02/23/2012   Systemic Nausea and Vomiting Hydrocod-cpm-pe-acetaminophen  03/19/2010    Other (comments) GI Upset Seafood [Shellfish Containing Products]  04/20/2012   Topical Rash Current Immunizations  Reviewed on 10/23/2017 Name Date Influenza High Dose Vaccine PF  Incomplete Influenza Vaccine 11/18/2014,  Incomplete Influenza Vaccine Split 11/2/2012, 9/27/2011, 10/14/2010 Pneumococcal Polysaccharide (PPSV-23) 10/29/2014 Reviewed by Idalia Domingo MD on 10/23/2017 at  9:22 AM  
 Reviewed by Idalia Domingo MD on 10/23/2017 at  9:22 AM  
 Reviewed by Idalia Domingo MD on 10/23/2017 at  9:22 AM  
 Reviewed by Idalia Domingo MD on 10/23/2017 at  9:23 AM  
You Were Diagnosed With   
  
 Codes Comments Type 2 diabetes mellitus without complication, without long-term current use of insulin (HCC)    -  Primary ICD-10-CM: E11.9 ICD-9-CM: 250.00 Essential hypertension     ICD-10-CM: I10 
ICD-9-CM: 401.9 Pure hypercholesterolemia     ICD-10-CM: E78.00 ICD-9-CM: 272.0 Encounter for immunization     ICD-10-CM: K06 ICD-9-CM: V03.89 Vitals BP Pulse Resp Height(growth percentile) Weight(growth percentile) SpO2 155/76 (BP 1 Location: Right arm, BP Patient Position: Sitting) 86 18 5' 4\" (1.626 m) 148 lb (67.1 kg) 98% BMI Smoking Status 25.4 kg/m2 Former Smoker BMI and BSA Data Body Mass Index Body Surface Area  
 25.4 kg/m 2 1.74 m 2 Preferred Pharmacy Pharmacy Name Phone [facility]/PHARMACY #[id number] Alberto, [address] [address] [phone] Your Updated Medication List  
  
   
This list is accurate as of: 10/23/17  9:28 AM.  Always use your most recent med list.  
  
  
  
  
 acetaminophen 500 mg tablet Commonly known as:  TYLENOL Take 1 Tab by mouth two (2) times daily as needed. aspirin 81 mg chewable tablet Take 1 Tab by mouth every other day. cholecalciferol 1,000 unit Cap Commonly known as:  VITAMIN D3  
TAKE ONE CAPSULE BY MOUTH EVERY DAY  
  
 diclofenac 1 % Gel Commonly known as:  VOLTAREN  
APPLY 2 GRAMS TO AFFECTED AREA TWO (2) TIMES A DAY. fluticasone 50 mcg/actuation nasal spray Commonly known as:  Dave [place] INHALE 1 SPRAY EACH NOSTRIL AT BEDTIME  
  
 glipiZIDE SR 2.5 mg CR tablet Commonly known as:  GLUCOTROL XL  
TAKE 1 TABLET BY MOUTH EVERY DAY  
  
 hydrocortisone 2.5 % lotion Commonly known as:  HYTONE  
APPLY A THIN LAYER TO AFFECTED AREA TWICE DAILY Iron 325 mg (65 mg iron) tablet Generic drug:  ferrous sulfate TAKE 1 TABLET BY MOUTH DAILY (BEFORE BREAKFAST). lisinopril 10 mg tablet Commonly known as:  PRINIVIL, ZESTRIL  
TAKE 1 TABLET BY MOUTH DAILY DISCONTINUE HCTZ  
  
 meloxicam 15 mg tablet Commonly known as:  MOBIC Take 1 Tab by mouth daily. OTHER  
500 mg. [facility] pain relief  
  
 pneumococcal 13 taco conj dip 0.5 mL Syrg injection Commonly known as:  PREVNAR-13  
0.5 mL by IntraMUSCular route once for 1 dose. SENNA PLUS 8.6-50 mg per tablet Generic drug:  senna-docusate TAKE 1 TAB BY MOUTH DAILY. simvastatin 10 mg tablet Commonly known as:  ZOCOR Take 1 Tab by mouth nightly. SITagliptin-metFORMIN 50-1,000 mg Tm24 Commonly known as:  JANUMET XR  
2 tab po QD SYSTANE (PROPYLENE GLYCOL) 0.4-0.3 % Drop Generic drug:  peg 400-propylene glycol Administer 1 Drop to left eye as needed. tamsulosin 0.4 mg capsule Commonly known as:  FLOMAX TAKE ONE CAPSULE BY MOUTH EVERY DAY  
  
 traMADol 50 mg tablet Commonly known as:  ULTRAM  
Take 1 Tab by mouth daily as needed for Pain.  
  
 triamcinolone 55 mcg nasal inhaler Commonly known as:  NASACORT AQ  
1 [place] by Both Nostrils route nightly. Prescriptions Sent to Pharmacy Refills  
 pneumococcal 13 taco conj dip (PREVNAR-13) 0.5 mL syrg injection 0 Si.5 mL by IntraMUSCular route once for 1 dose. Class: Normal  
 Pharmacy: [facility]/pharmacy #[address] [place] [address] Ph #: [phone] Route: IntraMUSCular  
 aspirin 81 mg chewable tablet 12 Sig: Take 1 Tab by mouth every other day. Class: Normal  
 Pharmacy: [facility]/pharmacy #[address] [place] [address] Ph #: [phone] Route: Oral  
  
We Performed the Following HEMOGLOBIN A1C WITH EAG [29068 CPT(R)] INFLUENZA VIRUS VACCINE, HIGH DOSE SEASONAL, PRESERVATIVE FREE [41864 CPT(R)] METABOLIC PANEL, COMPREHENSIVE [92760 CPT(R)] MICROALBUMIN, UR, RAND [id number] CPT(R)] REFERRAL TO OPHTHALMOLOGY [REF57 Custom] Comments:  
 DM Referral Information Referral ID Referred By Referred To  
  
 [id number] Nirali Ware [address] [place] [address] Nirali Phone: [phone] Fax: [phone] Visits Status Start Date End Date 1 New Request 10/23/17 10/23/18 If your referral has a status of pending review or denied, additional information will be sent to support the outcome of this decision. Patient Instructions Learning About Diabetes Food Guidelines Your Care Instructions Meal planning is important to manage diabetes. It helps keep your blood sugar at a target level (which you set with your doctor). You don't have to eat special foods. You can eat what your family eats, including sweets once in a while. But you do have to pay attention to how often you eat and how much you eat of certain foods. You may want to work with a dietitian or a certified diabetes educator (CDE) to help you plan meals and snacks. A dietitian or CDE can also help you lose weight if that is one of your goals. What should you know about eating carbs? Managing the amount of carbohydrate (carbs) you eat is an important part of healthy meals when you have diabetes. Carbohydrate is found in many foods. · Learn which foods have carbs. And learn the amounts of carbs in different foods. ¨ Bread, cereal, pasta, and rice have about 15 grams of carbs in a serving. A serving is 1 slice of bread (1 ounce), ½ cup of cooked cereal, or 1/3 cup of cooked pasta or rice. ¨ Fruits have 15 grams of carbs in a serving. A serving is 1 small fresh fruit, such as an apple or orange; ½ of a banana; ½ cup of cooked or canned fruit; ½ cup of fruit juice; 1 cup of melon or raspberries; or 2 tablespoons of dried fruit. ¨ Milk and no-sugar-added yogurt have 15 grams of carbs in a serving. A serving is 1 cup of milk or 2/3 cup of no-sugar-added yogurt. ¨ Starchy vegetables have 15 grams of carbs in a serving. A serving is ½ cup of mashed potatoes or sweet potato; 1 cup winter squash; ½ of a small baked potato; ½ cup of cooked beans; or ½ cup cooked corn or green peas. · Learn how much carbs to eat each day and at each meal. A dietitian or CDE can teach you how to keep track of the amount of carbs you eat. This is called carbohydrate counting.  
· If you are not sure how to count carbohydrate grams, use the Plate Method to plan meals. It is a good, quick way to make sure that you have a balanced meal. It also helps you spread carbs throughout the day. ¨ Divide your plate by types of foods. Put non-starchy vegetables on half the plate, meat or other protein food on one-quarter of the plate, and a grain or starchy vegetable in the final quarter of the plate. To this you can add a small piece of fruit and 1 cup of milk or yogurt, depending on how many carbs you are supposed to eat at a meal. 
· Try to eat about the same amount of carbs at each meal. Do not \"save up\" your daily allowance of carbs to eat at one meal. 
· Proteins have very little or no carbs per serving. Examples of proteins are beef, chicken, turkey, fish, eggs, tofu, cheese, cottage cheese, and peanut butter. A serving size of meat is 3 ounces, which is about the size of a deck of cards. Examples of meat substitute serving sizes (equal to 1 ounce of meat) are 1/4 cup of cottage cheese, 1 egg, 1 tablespoon of peanut butter, and ½ cup of tofu. How can you eat out and still eat healthy? · Learn to estimate the serving sizes of foods that have carbohydrate. If you measure food at home, it will be easier to estimate the amount in a serving of restaurant food. · If the meal you order has too much carbohydrate (such as potatoes, corn, or baked beans), ask to have a low-carbohydrate food instead. Ask for a salad or green vegetables. · If you use insulin, check your blood sugar before and after eating out to help you plan how much to eat in the future. · If you eat more carbohydrate at a meal than you had planned, take a walk or do other exercise. This will help lower your blood sugar. What else should you know? · Limit saturated fat, such as the fat from meat and dairy products. This is a healthy choice because people who have diabetes are at higher risk of heart disease.  So choose lean cuts of meat and nonfat or low-fat dairy products. Use olive or canola oil instead of butter or shortening when cooking. · Don't skip meals. Your blood sugar may drop too low if you skip meals and take insulin or certain medicines for diabetes. · Check with your doctor before you drink alcohol. Alcohol can cause your blood sugar to drop too low. Alcohol can also cause a bad reaction if you take certain diabetes medicines. Follow-up care is a key part of your treatment and safety. Be sure to make and go to all appointments, and call your doctor if you are having problems. It's also a good idea to know your test results and keep a list of the medicines you take. Where can you learn more? Go to http://aliyah-leigh.info/. Enter E758 in the search box to learn more about \"Learning About Diabetes Food Guidelines. \" Current as of: March 13, 2017 Content Version: 11.3 © 0465-9327 [employer]. Care instructions adapted under license by [employer] (which disclaims liability or warranty for this information). If you have questions about a medical condition or this instruction, always ask your healthcare professional. [address] any warranty or liability for your use of this information. Introducing [facility] & HEALTH SERVICES! Dear Nelson Obrien: 
Thank you for requesting a RagingWire account. Our records indicate that you have previously registered for a RagingWire account but its currently inactive. Please call our RagingWire support line at [phone]. Additional Information If you have questions, please visit the Frequently Asked Questions section of the RagingWire website at [email or web address] Remember, RagingWire is NOT to be used for urgent needs. For medical emergencies, dial 911. Now available from your iPhone and Android! Please provide this summary of care documentation to your next provider. Your primary care clinician is listed as Lindsay Abebe. If you have any questions after today's visit, please call [phone].

## 2017-10-24 LAB
ALBUMIN SERPL-MCNC: 4.2 G/DL (ref 3.5–4.8)
ALBUMIN/GLOB SERPL: 1.6 {RATIO} (ref 1.2–2.2)
ALP SERPL-CCNC: 55 IU/L (ref 39–117)
ALT SERPL-CCNC: 17 IU/L (ref 0–44)
AST SERPL-CCNC: 21 IU/L (ref 0–40)
BILIRUB SERPL-MCNC: 0.3 MG/DL (ref 0–1.2)
BUN SERPL-MCNC: 14 MG/DL (ref 8–27)
BUN/CREAT SERPL: 16 (ref 10–24)
CALCIUM SERPL-MCNC: 9.2 MG/DL (ref 8.6–10.2)
CHLORIDE SERPL-SCNC: 102 MMOL/L (ref 96–106)
CO2 SERPL-SCNC: 28 MMOL/L (ref 18–29)
CREAT SERPL-MCNC: 0.86 MG/DL (ref 0.76–1.27)
EST. AVERAGE GLUCOSE BLD GHB EST-MCNC: 169 MG/DL
GFR SERPLBLD CREATININE-BSD FMLA CKD-EPI: 83 ML/MIN/1.73
GFR SERPLBLD CREATININE-BSD FMLA CKD-EPI: 96 ML/MIN/1.73
GLOBULIN SER CALC-MCNC: 2.7 G/DL (ref 1.5–4.5)
GLUCOSE SERPL-MCNC: 155 MG/DL (ref 65–99)
HBA1C MFR BLD: 7.5 % (ref 4.8–5.6)
MICROALBUMIN UR-MCNC: 9.8 UG/ML
POTASSIUM SERPL-SCNC: 4.1 MMOL/L (ref 3.5–5.2)
PROT SERPL-MCNC: 6.9 G/DL (ref 6–8.5)
SODIUM SERPL-SCNC: 142 MMOL/L (ref 134–144)

## 2017-10-30 DIAGNOSIS — L30.9 DERMATITIS: ICD-10-CM

## 2017-10-30 RX ORDER — CLOBETASOL PROPIONATE 0.5 MG/G
OINTMENT TOPICAL
Qty: 15 G | Refills: 0 | Status: SHIPPED | OUTPATIENT
Start: 2017-10-30 | End: 2018-02-01 | Stop reason: SDUPTHER

## 2017-11-06 DIAGNOSIS — E78.00 PURE HYPERCHOLESTEROLEMIA: ICD-10-CM

## 2017-11-06 DIAGNOSIS — M47.22 OSTEOARTHRITIS OF SPINE WITH RADICULOPATHY, CERVICAL REGION: ICD-10-CM

## 2017-11-06 DIAGNOSIS — I10 ESSENTIAL HYPERTENSION: ICD-10-CM

## 2017-11-06 RX ORDER — SIMVASTATIN 10 MG/1
10 TABLET, FILM COATED ORAL
Qty: 90 TAB | Refills: 3 | Status: SHIPPED | OUTPATIENT
Start: 2017-11-06 | End: 2018-09-03 | Stop reason: SDUPTHER

## 2017-11-06 RX ORDER — GUAIFENESIN 100 MG/5ML
81 LIQUID (ML) ORAL EVERY OTHER DAY
Qty: 30 TAB | Refills: 12 | Status: SHIPPED | OUTPATIENT
Start: 2017-11-06 | End: 2017-11-08 | Stop reason: SDUPTHER

## 2017-11-06 RX ORDER — MELOXICAM 15 MG/1
15 TABLET ORAL DAILY
Qty: 30 TAB | Refills: 5 | Status: SHIPPED | OUTPATIENT
Start: 2017-11-06 | End: 2017-11-08 | Stop reason: SDUPTHER

## 2017-11-08 DIAGNOSIS — M47.22 OSTEOARTHRITIS OF SPINE WITH RADICULOPATHY, CERVICAL REGION: ICD-10-CM

## 2017-11-08 DIAGNOSIS — I10 ESSENTIAL HYPERTENSION: ICD-10-CM

## 2017-11-08 RX ORDER — LISINOPRIL 10 MG/1
10 TABLET ORAL DAILY
Qty: 30 TAB | Refills: 4 | Status: SHIPPED | OUTPATIENT
Start: 2017-11-08 | End: 2017-11-20 | Stop reason: SDUPTHER

## 2017-11-08 RX ORDER — ACETAMINOPHEN 500 MG
500 TABLET ORAL
Qty: 60 TAB | Refills: 4 | Status: SHIPPED | OUTPATIENT
Start: 2017-11-08 | End: 2018-02-26 | Stop reason: SDUPTHER

## 2017-11-08 RX ORDER — MELOXICAM 15 MG/1
15 TABLET ORAL DAILY
Qty: 30 TAB | Refills: 5 | Status: SHIPPED | OUTPATIENT
Start: 2017-11-08 | End: 2018-09-25 | Stop reason: SDUPTHER

## 2017-11-08 RX ORDER — GUAIFENESIN 100 MG/5ML
81 LIQUID (ML) ORAL EVERY OTHER DAY
Qty: 30 TAB | Refills: 12 | Status: SHIPPED | OUTPATIENT
Start: 2017-11-08 | End: 2018-09-26 | Stop reason: SDUPTHER

## 2017-11-08 NOTE — TELEPHONE ENCOUNTER
[facility] [employer] [facility] and asked that you send 90 day supply's for Lisinopril and Meloxicam. Phone number is [phone]

## 2017-11-21 RX ORDER — LISINOPRIL 10 MG/1
10 TABLET ORAL DAILY
Qty: 90 TAB | Refills: 2 | Status: SHIPPED | OUTPATIENT
Start: 2017-11-21 | End: 2018-08-22 | Stop reason: SDUPTHER

## 2017-12-07 DIAGNOSIS — K59.00 CONSTIPATION, UNSPECIFIED CONSTIPATION TYPE: ICD-10-CM

## 2017-12-07 RX ORDER — CETIRIZINE HYDROCHLORIDE, PSEUDOEPHEDRINE HYDROCHLORIDE 5; 120 MG/1; MG/1
TABLET, FILM COATED, EXTENDED RELEASE ORAL
Qty: 30 TAB | Refills: 3 | Status: SHIPPED | OUTPATIENT
Start: 2017-12-07 | End: 2018-04-26 | Stop reason: SDUPTHER

## 2017-12-29 DIAGNOSIS — M15.9 PRIMARY OSTEOARTHRITIS INVOLVING MULTIPLE JOINTS: ICD-10-CM

## 2017-12-29 RX ORDER — DICLOFENAC SODIUM 10 MG/G
GEL TOPICAL
Qty: 100 G | Refills: 3 | Status: SHIPPED | OUTPATIENT
Start: 2017-12-29 | End: 2018-06-08 | Stop reason: SDUPTHER

## 2018-01-19 DIAGNOSIS — E11.9 TYPE 2 DIABETES MELLITUS WITHOUT COMPLICATION, WITHOUT LONG-TERM CURRENT USE OF INSULIN (HCC): ICD-10-CM

## 2018-02-01 DIAGNOSIS — L30.9 DERMATITIS: ICD-10-CM

## 2018-02-01 RX ORDER — CLOBETASOL PROPIONATE 0.5 MG/G
OINTMENT TOPICAL
Qty: 15 G | Refills: 0 | Status: SHIPPED | OUTPATIENT
Start: 2018-02-01 | End: 2018-04-09 | Stop reason: SDUPTHER

## 2018-02-05 DIAGNOSIS — E11.9 TYPE 2 DIABETES MELLITUS WITHOUT COMPLICATION, WITHOUT LONG-TERM CURRENT USE OF INSULIN (HCC): Primary | ICD-10-CM

## 2018-02-07 DIAGNOSIS — E11.9 TYPE 2 DIABETES MELLITUS WITHOUT COMPLICATION, WITHOUT LONG-TERM CURRENT USE OF INSULIN (HCC): Primary | ICD-10-CM

## 2018-02-07 RX ORDER — METFORMIN HYDROCHLORIDE 1000 MG/1
1000 TABLET ORAL 2 TIMES DAILY WITH MEALS
Qty: 60 TAB | Refills: 5 | Status: SHIPPED | OUTPATIENT
Start: 2018-02-07 | End: 2018-03-23 | Stop reason: SDUPTHER

## 2018-02-08 RX ORDER — AMPICILLIN TRIHYDRATE 500 MG
CAPSULE ORAL
Qty: 30 CAP | Refills: 5 | Status: SHIPPED | OUTPATIENT
Start: 2018-02-08 | End: 2018-04-16 | Stop reason: SDUPTHER

## 2018-02-26 ENCOUNTER — OFFICE VISIT (OUTPATIENT)
Dept: INTERNAL MEDICINE CLINIC | Age: 78
End: 2018-02-26

## 2018-02-26 ENCOUNTER — HOSPITAL ENCOUNTER (OUTPATIENT)
Dept: LAB | Age: 78
Discharge: HOME | End: 2018-02-26
Payer: MEDICARE

## 2018-02-26 VITALS
RESPIRATION RATE: 20 BRPM | HEIGHT: 64 IN | OXYGEN SATURATION: 97 % | SYSTOLIC BLOOD PRESSURE: 174 MMHG | BODY MASS INDEX: 25.27 KG/M2 | DIASTOLIC BLOOD PRESSURE: 78 MMHG | TEMPERATURE: 98 F | WEIGHT: 148 LBS | HEART RATE: 78 BPM

## 2018-02-26 DIAGNOSIS — G30.9 ALZHEIMER'S DEMENTIA WITHOUT BEHAVIORAL DISTURBANCE, UNSPECIFIED TIMING OF DEMENTIA ONSET: ICD-10-CM

## 2018-02-26 DIAGNOSIS — E11.9 TYPE 2 DIABETES MELLITUS WITHOUT COMPLICATION, WITHOUT LONG-TERM CURRENT USE OF INSULIN (HCC): Primary | ICD-10-CM

## 2018-02-26 DIAGNOSIS — M47.22 OSTEOARTHRITIS OF SPINE WITH RADICULOPATHY, CERVICAL REGION: ICD-10-CM

## 2018-02-26 DIAGNOSIS — F02.80 ALZHEIMER'S DEMENTIA WITHOUT BEHAVIORAL DISTURBANCE, UNSPECIFIED TIMING OF DEMENTIA ONSET: ICD-10-CM

## 2018-02-26 DIAGNOSIS — M17.0 PRIMARY OSTEOARTHRITIS OF BOTH KNEES: ICD-10-CM

## 2018-02-26 DIAGNOSIS — E78.00 PURE HYPERCHOLESTEROLEMIA: ICD-10-CM

## 2018-02-26 PROCEDURE — 82607 VITAMIN B-12: CPT

## 2018-02-26 PROCEDURE — 80053 COMPREHEN METABOLIC PANEL: CPT

## 2018-02-26 PROCEDURE — 36415 COLL VENOUS BLD VENIPUNCTURE: CPT

## 2018-02-26 PROCEDURE — 83721 ASSAY OF BLOOD LIPOPROTEIN: CPT

## 2018-02-26 PROCEDURE — 84443 ASSAY THYROID STIM HORMONE: CPT

## 2018-02-26 PROCEDURE — 83036 HEMOGLOBIN GLYCOSYLATED A1C: CPT

## 2018-02-26 RX ORDER — ACETAMINOPHEN 500 MG
500 TABLET ORAL
Qty: 60 TAB | Refills: 12 | Status: SHIPPED | OUTPATIENT
Start: 2018-02-26

## 2018-02-26 RX ORDER — DONEPEZIL HYDROCHLORIDE 5 MG/1
5 TABLET, FILM COATED ORAL
Qty: 30 TAB | Refills: 5 | Status: SHIPPED | OUTPATIENT
Start: 2018-02-26 | End: 2018-04-16 | Stop reason: SDUPTHER

## 2018-02-26 NOTE — MR AVS SNAPSHOT
[address] [facility] [address] [place] [address] 
[phone] Patient: Lo Moreno MRN:  [id number] Visit Information Date & Time Provider Department Dept. Phone Encounter #  
 2018 10:15 AM Nikki Laguna, [address] [facility] Internal Medicine [phone] [id number] Upcoming Health Maintenance Date Due ZOSTER VACCINE AGE 60> 1998 Pneumococcal 65+ Low/Medium Risk (2 of 2 - PCV13) 10/29/2015 EYE EXAM RETINAL OR DILATED Q1 2016 GLAUCOMA SCREENING Q2Y 2017 LIPID PANEL Q1 10/10/2017 FOOT EXAM Q1 2018 HEMOGLOBIN A1C Q6M 2018 MEDICARE YEARLY EXAM 2018 MICROALBUMIN Q1 10/23/2018 DTaP/Tdap/Td series (2 - Td) 2027 Allergies as of 2018  Review Complete On: 2018 By: Nikki Laguna MD  
  
 Severity Noted Reaction Type Reactions Amoxicillin  2012   Systemic Nausea and Vomiting Hydrocod-cpm-pe-acetaminophen  2010    Other (comments) GI Upset Seafood [Shellfish Containing Products]  2012   Topical Rash Current Immunizations  Reviewed on 10/23/2017 Name Date Influenza High Dose Vaccine PF 10/23/2017 Influenza Vaccine 2014,  Incomplete Influenza Vaccine Split 2012, 2011, 10/14/2010 Pneumococcal Polysaccharide (PPSV-23) 10/29/2014 Not reviewed this visit You Were Diagnosed With   
  
 Codes Comments Type 2 diabetes mellitus without complication, without long-term current use of insulin (HCC)    -  Primary ICD-10-CM: E11.9 ICD-9-CM: 250.00 Osteoarthritis of spine with radiculopathy, cervical region     ICD-10-CM: M47.22 
ICD-9-CM: 721.0 Primary osteoarthritis of both knees     ICD-10-CM: M17.0 ICD-9-CM: 715.16 Pure hypercholesterolemia     ICD-10-CM: E78.00 ICD-9-CM: 272.0  Alzheimer's dementia without behavioral disturbance, unspecified timing of dementia onset     ICD-10-CM: G30.9, F02.80 ICD-9-CM: 331.0, 294.10 Vitals BP Pulse Temp Resp Height(growth percentile) Weight(growth percentile) 174/78 (BP 1 Location: Left arm, BP Patient Position: Sitting) 78 98 °F (36.7 °C) (Oral) 20 5' 4\" (1.626 m) 148 lb (67.1 kg) SpO2 BMI Smoking Status 97% 25.4 kg/m2 Former Smoker Vitals History BMI and BSA Data Body Mass Index Body Surface Area  
 25.4 kg/m 2 1.74 m 2 [facility]erred [id number] [address] [phone] Your Updated Medication List  
  
   
This list is accurate as of 2/26/18 11:42 AM.  Always use your most recent med list.  
  
  
  
  
 acetaminophen 500 mg tablet Commonly known as:  TYLENOL Take 1 Tab by mouth two (2) times daily as needed. aspirin 81 mg chewable tablet Take 1 Tab by mouth every other day. diclofenac 1 % Gel Commonly known as:  VOLTAREN  
APPLY 2 GRAMS TO AFFECTED AREA TWO (2) TIMES A DAY. donepezil 5 mg tablet Commonly known as:  ARICEPT Take 1 Tab by mouth nightly. glipiZIDE SR 2.5 mg CR tablet Commonly known as:  GLUCOTROL XL  
TAKE 1 TABLET BY MOUTH EVERY DAY Iron 325 mg (65 mg iron) tablet Generic drug:  ferrous sulfate TAKE 1 TABLET BY MOUTH DAILY (BEFORE BREAKFAST). linagliptin 5 mg tablet Commonly known as:  Areta Current Take 1 Tab by mouth daily. lisinopril 10 mg tablet Commonly known as:  Markell Gladsy Take 1 Tab by mouth daily. meloxicam 15 mg tablet Commonly known as:  MOBIC Take 1 Tab by mouth daily. metFORMIN 1,000 mg tablet Commonly known as:  GLUCOPHAGE Take 1 Tab by mouth two (2) times daily (with meals). OTHER  
500 mg. [facility] pain relief SENNA PLUS 8.6-50 mg per tablet Generic drug:  senna-docusate TAKE 1 TABLET BY MOUTH EVERY DAY  
  
 simvastatin 10 mg tablet Commonly known as:  ZOCOR Take 1 Tab by mouth nightly. SYSTANE (PROPYLENE GLYCOL) 0.4-0.3 % Drop Generic drug:  peg 400-propylene glycol Administer 1 Drop to left eye as needed. tamsulosin 0.4 mg capsule Commonly known as:  FLOMAX TAKE ONE CAPSULE BY MOUTH EVERY DAY  
  
 triamcinolone 55 mcg nasal inhaler Commonly known as:  NASACORT AQ  
1 [place] by Both Nostrils route nightly. VITAMIN D3 1,000 unit Cap Generic drug:  cholecalciferol TAKE ONE CAPSULE BY MOUTH EVERY DAY Prescriptions Sent to Pharmacy Refills  
 acetaminophen (TYLENOL) 500 mg tablet 12 Sig: Take 1 Tab by mouth two (2) times daily as needed. Class: Normal  
 Pharmacy: [facility]/pharmacy #[id number] Ph #: [phone] Route: Oral  
 donepezil (ARICEPT) 5 mg tablet 5 Sig: Take 1 Tab by mouth nightly. Class: Normal  
 Pharmacy: [facility]/pharmacy #[id number] Ph #: [phone] Route: Oral  
  
We Performed the Following HEMOGLOBIN A1C WITH EAG [99340 CPT(R)] LDL, DIRECT [id number] CPT(R)] METABOLIC PANEL, COMPREHENSIVE [54251 CPT(R)] TSH 3RD GENERATION [86389 CPT(R)] VITAMIN B12 [id number] CPT(R)] Introducing [facility] & HEALTH SERVICES! Dear Miguel Stein: 
Thank you for requesting a CallAround account. Our records indicate that you have previously registered for a CallAround account but its currently inactive. Please call our CallAround support line at [phone]. Additional Information If you have questions, please visit the Frequently Asked Questions section of the CallAround website at [email or web address] [email or web address]/VTL Groupt/. Remember, CallAround is NOT to be used for urgent needs. For medical emergencies, dial 911. Now available from your iPhone and Android! Please provide this summary of care documentation to your next provider. Your primary care clinician is listed as Irma Tee. If you have any questions after today's visit, please call [phone].

## 2018-02-26 NOTE — PROGRESS NOTES
HISTORY OF PRESENT ILLNESS  Tommy Cabot is a 77 y.o. male here for follow up . He is accompanied by his son. Report more forgetfulness lately. Has short-term memory loss. Repeating the same thing over and over. He is seen for diabetes. Jake Myles He reports medication compliance: compliant all of the time. Diabetic diet compliance: compliant most of the time. Home glucose monitoring: is performed sporadically, values are usually normal. Further diabetic ROS: no polyuria or polydipsia, no chest pain, dyspnea or TIA's, no numbness, tingling or pain in extremities, no hypoglycemia. Lab review: labs are reviewed, up to date and normal, labs reviewed and discussed with patient. Eye exam: up to date. Reports pain in lower back. on voltarin gel. Also having knee pain. Ran out of Tylenol. Has HTN,BP is stable. Diabetes   Pertinent negatives include no chest pain. Hypertension    Pertinent negatives include no chest pain, no palpitations and no blurred vision. Cholesterol Problem   Pertinent negatives include no chest pain. Memory Loss    His past medical history is significant for hypertension. Follow-up   Pertinent negatives include no chest pain. Review of Systems   Constitutional: Negative. Negative for chills and fever. HENT: Negative. Eyes: Negative. Negative for blurred vision and double vision. Respiratory: Negative. Cardiovascular: Negative. Negative for chest pain and palpitations. Gastrointestinal: Negative. Genitourinary: Negative. Musculoskeletal: Positive for joint pain. Skin: Negative. Neurological: Negative. Psychiatric/Behavioral: Positive for memory loss. Physical Exam   Constitutional: He appears well-developed and well-nourished. No distress. Neck: Normal range of motion. Neck supple. No JVD present. No thyromegaly present. Cardiovascular: Normal rate, regular rhythm, normal heart sounds and intact distal pulses.     Pulmonary/Chest: Effort normal and breath sounds normal. No respiratory distress. He has no wheezes. Musculoskeletal: He exhibits tenderness. He exhibits no edema. Knee joint: Tender. Range of motion restricted. Psychiatric: He has a normal mood and affect. His behavior is normal.   Memory loss. ASSESSMENT and PLAN  Diagnoses and all orders for this visit:    1. Type 2 diabetes mellitus without complication, without long-term current use of insulin ([address]    Did not cover through insurance. I have suggested switched to Metformin thousand medicine twice a day along with    Tradjenta. Advised him to finished  Janumet and start the new medications. We will do,  -     METABOLIC PANEL, COMPREHENSIVE  -     HEMOGLOBIN A1C WITH EAG  -     LDL, DIRECT    2. Osteoarthritis of spine with radiculopathy, cervical region    Advised to take meloxicam diclofenac gel and Tylenol.  -     acetaminophen (TYLENOL) 500 mg tablet; Take 1 Tab by mouth two (2) times daily as needed. 3. Primary osteoarthritis of both knees    Will call in,  -     acetaminophen (TYLENOL) 500 mg tablet; Take 1 Tab by mouth two (2) times daily as needed. 4. Pure hypercholesterolemia  We will check LDL level. On statin. 5. Alzheimer's dementia without behavioral disturbance, unspecified timing of dementia onset    Probably early dementia. Will start,  -     donepezil (ARICEPT) 5 mg tablet; Take 1 Tab by mouth nightly.  -     TSH 3RD GENERATION  -     VITAMIN B12      Issues reviewed with her: referral to Diabetic Education department, diabetic diet discussed in detail, written exchange diet given, home glucose monitoring emphasized, all medications, side effects and compliance discussed carefully, foot care discussed and Podiatry visits discussed, annual eye examinations at Ophthalmology discussed, long term diabetic complications discussed and labs immediately prior to next visit.

## 2018-02-26 NOTE — PROGRESS NOTES
Health Maintenance Due   Topic Date Due    ZOSTER VACCINE AGE 60>  11/07/1998    Pneumococcal 65+ Low/Medium Risk (2 of 2 - PCV13) 10/29/2015    EYE EXAM RETINAL OR DILATED Q1  02/14/2016    GLAUCOMA SCREENING Q2Y  02/14/2017    LIPID PANEL Q1  10/10/2017    FOOT EXAM Q1  01/18/2018       Chief Complaint   Patient presents with    Diabetes     4 month    Hypertension    Cholesterol Problem    Memory Loss     son states memory loss       1. Have you been to the ER, urgent care clinic since your last visit? Hospitalized since your last visit? No    2. Have you seen or consulted any other health care providers outside of the [address] [place] [address] since your last visit? Include any pap smears or colon screening. No    3) Do you have an Advance Directive on file? no    4) Are you interested in receiving information on Advance Directives? NO      Patient is accompanied by self I have received verbal consent from Sony Adams to discuss any/all medical information while they are present in the room.

## 2018-02-26 NOTE — LETTER
2/28/2018 12:56 PM 
 
Mr. Prabhakar Wallace 
[address] [place] [address] Dear Prabhakar Wallace: 
 
Please find your most recent results below. Resulted Orders METABOLIC PANEL, COMPREHENSIVE Result Value Ref Range Glucose 136 (H) 65 - 99 mg/dL BUN 15 8 - 27 mg/dL Creatinine 0.86 0.76 - 1.27 mg/dL GFR est non-AA 82 >59 mL/min/1.73 GFR est AA 95 >59 mL/min/1.73  
 BUN/Creatinine ratio 17 10 - 24 Sodium 144 134 - 144 mmol/L Potassium 4.2 3.5 - 5.2 mmol/L Chloride 102 96 - 106 mmol/L  
 CO2 26 18 - 29 mmol/L Calcium 9.3 8.6 - 10.2 mg/dL Protein, total 7.2 6.0 - 8.5 g/dL Albumin 4.3 3.5 - 4.8 g/dL GLOBULIN, TOTAL 2.9 1.5 - 4.5 g/dL A-G Ratio 1.5 1.2 - 2.2 Bilirubin, total 0.3 0.0 - 1.2 mg/dL Alk. phosphatase 64 39 - 117 IU/L  
 AST (SGOT) 18 0 - 40 IU/L  
 ALT (SGPT) 16 0 - 44 IU/L Narrative Performed at:  [address]  [id number] : Jeramy Lerma MD, Phone:  [phone] HEMOGLOBIN A1C WITH EAG Result Value Ref Range Hemoglobin A1c 7.3 (H) 4.8 - 5.6 % Comment:  
            Pre-diabetes: 5.7 - 6.4 Diabetes: >6.4 Glycemic control for adults with diabetes: <7.0 Estimated average glucose 163 mg/dL Narrative Performed at:  [address]  [id number] : Jeramy Lerma MD, Phone:  [phone] LDL, DIRECT Result Value Ref Range LDL,Direct 90 0 - 99 mg/dL Narrative Performed at:  [address]  [id number] : Jeramy Lerma MD, Phone:  [phone] TSH 3RD GENERATION Result Value Ref Range TSH 0.587 0.450 - 4.500 uIU/mL Narrative Performed at:  [address]  [id number] : Jeramy Lerma MD, Phone:  [phone] VITAMIN B12 Result Value Ref Range Vitamin B12 324 232 - 1245 pg/mL Narrative Performed at:  [address]  [id number] : Sarahy Chan MD, Phone:  [phone] RECOMMENDATIONS: 
None. Keep up the good work! Please call me if you have any questions: [phone] Sincerely, Lili Taylor MD

## 2018-02-27 LAB
ALBUMIN SERPL-MCNC: 4.3 G/DL (ref 3.5–4.8)
ALBUMIN/GLOB SERPL: 1.5 {RATIO} (ref 1.2–2.2)
ALP SERPL-CCNC: 64 IU/L (ref 39–117)
ALT SERPL-CCNC: 16 IU/L (ref 0–44)
AST SERPL-CCNC: 18 IU/L (ref 0–40)
BILIRUB SERPL-MCNC: 0.3 MG/DL (ref 0–1.2)
BUN SERPL-MCNC: 15 MG/DL (ref 8–27)
BUN/CREAT SERPL: 17 (ref 10–24)
CALCIUM SERPL-MCNC: 9.3 MG/DL (ref 8.6–10.2)
CHLORIDE SERPL-SCNC: 102 MMOL/L (ref 96–106)
CO2 SERPL-SCNC: 26 MMOL/L (ref 18–29)
CREAT SERPL-MCNC: 0.86 MG/DL (ref 0.76–1.27)
EST. AVERAGE GLUCOSE BLD GHB EST-MCNC: 163 MG/DL
GFR SERPLBLD CREATININE-BSD FMLA CKD-EPI: 82 ML/MIN/1.73
GFR SERPLBLD CREATININE-BSD FMLA CKD-EPI: 95 ML/MIN/1.73
GLOBULIN SER CALC-MCNC: 2.9 G/DL (ref 1.5–4.5)
GLUCOSE SERPL-MCNC: 136 MG/DL (ref 65–99)
HBA1C MFR BLD: 7.3 % (ref 4.8–5.6)
LDLC SERPL DIRECT ASSAY-MCNC: 90 MG/DL (ref 0–99)
POTASSIUM SERPL-SCNC: 4.2 MMOL/L (ref 3.5–5.2)
PROT SERPL-MCNC: 7.2 G/DL (ref 6–8.5)
SODIUM SERPL-SCNC: 144 MMOL/L (ref 134–144)
TSH SERPL DL<=0.005 MIU/L-ACNC: 0.59 UIU/ML (ref 0.45–4.5)
VIT B12 SERPL-MCNC: 324 PG/ML (ref 232–1245)

## 2018-03-23 DIAGNOSIS — E11.9 TYPE 2 DIABETES MELLITUS WITHOUT COMPLICATION, WITHOUT LONG-TERM CURRENT USE OF INSULIN (HCC): ICD-10-CM

## 2018-03-26 RX ORDER — GLIPIZIDE 2.5 MG/1
TABLET, EXTENDED RELEASE ORAL
Qty: 90 TAB | Refills: 1 | Status: SHIPPED | OUTPATIENT
Start: 2018-03-26 | End: 2018-06-06 | Stop reason: DRUGHIGH

## 2018-03-26 RX ORDER — METFORMIN HYDROCHLORIDE 1000 MG/1
1000 TABLET ORAL 2 TIMES DAILY WITH MEALS
Qty: 180 TAB | Refills: 1 | Status: SHIPPED | OUTPATIENT
Start: 2018-03-26 | End: 2019-01-14 | Stop reason: ALTCHOICE

## 2018-04-02 RX ORDER — TAMSULOSIN HYDROCHLORIDE 0.4 MG/1
CAPSULE ORAL
Qty: 90 CAP | Refills: 1 | Status: SHIPPED | OUTPATIENT
Start: 2018-04-02 | End: 2018-10-10 | Stop reason: SDUPTHER

## 2018-04-07 DIAGNOSIS — L30.9 DERMATITIS: ICD-10-CM

## 2018-04-09 RX ORDER — FLUTICASONE PROPIONATE 50 MCG
SPRAY, SUSPENSION (ML) NASAL
Qty: 1 BOTTLE | Refills: 2 | Status: SHIPPED | COMMUNITY
Start: 2018-04-09 | End: 2018-04-16 | Stop reason: SDUPTHER

## 2018-04-09 RX ORDER — CLOBETASOL PROPIONATE 0.5 MG/G
OINTMENT TOPICAL
Qty: 15 G | Refills: 0 | Status: SHIPPED | COMMUNITY
Start: 2018-04-09 | End: 2018-06-04 | Stop reason: SDUPTHER

## 2018-04-16 DIAGNOSIS — F02.80 ALZHEIMER'S DEMENTIA WITHOUT BEHAVIORAL DISTURBANCE, UNSPECIFIED TIMING OF DEMENTIA ONSET: ICD-10-CM

## 2018-04-16 DIAGNOSIS — G30.9 ALZHEIMER'S DEMENTIA WITHOUT BEHAVIORAL DISTURBANCE, UNSPECIFIED TIMING OF DEMENTIA ONSET: ICD-10-CM

## 2018-04-16 RX ORDER — GLUCOSAMINE SULFATE 1500 MG
1000 POWDER IN PACKET (EA) ORAL DAILY
Qty: 90 CAP | Refills: 2 | Status: SHIPPED | OUTPATIENT
Start: 2018-04-16 | End: 2019-01-26 | Stop reason: SDUPTHER

## 2018-04-16 RX ORDER — DONEPEZIL HYDROCHLORIDE 5 MG/1
5 TABLET, FILM COATED ORAL
Qty: 90 TAB | Refills: 2 | Status: SHIPPED | OUTPATIENT
Start: 2018-04-16 | End: 2019-01-02 | Stop reason: SDUPTHER

## 2018-04-16 RX ORDER — FLUTICASONE PROPIONATE 50 MCG
1 SPRAY, SUSPENSION (ML) NASAL DAILY
Qty: 3 BOTTLE | Refills: 2 | Status: SHIPPED | OUTPATIENT
Start: 2018-04-16 | End: 2018-08-06 | Stop reason: SDUPTHER

## 2018-04-26 DIAGNOSIS — K59.00 CONSTIPATION, UNSPECIFIED CONSTIPATION TYPE: ICD-10-CM

## 2018-04-26 RX ORDER — CETIRIZINE HYDROCHLORIDE, PSEUDOEPHEDRINE HYDROCHLORIDE 5; 120 MG/1; MG/1
TABLET, FILM COATED, EXTENDED RELEASE ORAL
Qty: 30 TAB | Refills: 3 | Status: SHIPPED | OUTPATIENT
Start: 2018-04-26 | End: 2018-09-04 | Stop reason: SDUPTHER

## 2018-05-29 DIAGNOSIS — E11.9 TYPE 2 DIABETES MELLITUS WITHOUT COMPLICATION, WITHOUT LONG-TERM CURRENT USE OF INSULIN (HCC): ICD-10-CM

## 2018-05-30 RX ORDER — SITAGLIPTIN AND METFORMIN HYDROCHLORIDE 50; 1000 MG/1; MG/1
TABLET, FILM COATED, EXTENDED RELEASE ORAL
Qty: 180 TAB | Refills: 0 | Status: SHIPPED | OUTPATIENT
Start: 2018-05-30 | End: 2018-06-04 | Stop reason: SDUPTHER

## 2018-06-04 ENCOUNTER — OFFICE VISIT (OUTPATIENT)
Dept: INTERNAL MEDICINE CLINIC | Age: 78
End: 2018-06-04

## 2018-06-04 VITALS
WEIGHT: 141.4 LBS | HEIGHT: 64 IN | TEMPERATURE: 98.3 F | RESPIRATION RATE: 20 BRPM | BODY MASS INDEX: 24.14 KG/M2 | HEART RATE: 69 BPM | DIASTOLIC BLOOD PRESSURE: 72 MMHG | OXYGEN SATURATION: 96 % | SYSTOLIC BLOOD PRESSURE: 168 MMHG

## 2018-06-04 DIAGNOSIS — Z71.89 ADVANCE CARE PLANNING: ICD-10-CM

## 2018-06-04 DIAGNOSIS — E53.8 VITAMIN B12 DEFICIENCY: ICD-10-CM

## 2018-06-04 DIAGNOSIS — Z23 ENCOUNTER FOR IMMUNIZATION: ICD-10-CM

## 2018-06-04 DIAGNOSIS — J30.1 ALLERGIC RHINITIS DUE TO POLLEN, UNSPECIFIED SEASONALITY: ICD-10-CM

## 2018-06-04 DIAGNOSIS — E11.49 OTHER DIABETIC NEUROLOGICAL COMPLICATION ASSOCIATED WITH TYPE 2 DIABETES MELLITUS (HCC): ICD-10-CM

## 2018-06-04 DIAGNOSIS — Z00.00 MEDICARE ANNUAL WELLNESS VISIT, SUBSEQUENT: ICD-10-CM

## 2018-06-04 DIAGNOSIS — E11.9 TYPE 2 DIABETES MELLITUS WITHOUT COMPLICATION, WITHOUT LONG-TERM CURRENT USE OF INSULIN (HCC): ICD-10-CM

## 2018-06-04 DIAGNOSIS — R20.2 TINGLING: Primary | ICD-10-CM

## 2018-06-04 DIAGNOSIS — L30.9 DERMATITIS: ICD-10-CM

## 2018-06-04 PROBLEM — E11.40 TYPE 2 DIABETES MELLITUS WITH DIABETIC NEUROPATHY (HCC): Status: ACTIVE | Noted: 2018-06-04

## 2018-06-04 RX ORDER — CLOBETASOL PROPIONATE 0.5 MG/G
OINTMENT TOPICAL 2 TIMES DAILY
Qty: 15 G | Refills: 0 | Status: SHIPPED | OUTPATIENT
Start: 2018-06-04 | End: 2018-06-05 | Stop reason: ALTCHOICE

## 2018-06-04 RX ORDER — GABAPENTIN 100 MG/1
100 CAPSULE ORAL 2 TIMES DAILY
Qty: 60 CAP | Refills: 3 | Status: SHIPPED | OUTPATIENT
Start: 2018-06-04 | End: 2018-08-06 | Stop reason: SDUPTHER

## 2018-06-04 RX ORDER — LANOLIN ALCOHOL/MO/W.PET/CERES
500 CREAM (GRAM) TOPICAL DAILY
Qty: 90 TAB | Refills: 0 | Status: SHIPPED | OUTPATIENT
Start: 2018-06-04 | End: 2020-01-13 | Stop reason: ALTCHOICE

## 2018-06-04 RX ORDER — LORATADINE 10 MG/1
10 TABLET ORAL
Qty: 30 TAB | Refills: 0 | Status: SHIPPED | OUTPATIENT
Start: 2018-06-04 | End: 2021-03-03 | Stop reason: ALTCHOICE

## 2018-06-04 NOTE — PROGRESS NOTES
Health Maintenance Due   Topic Date Due    ZOSTER VACCINE AGE 60>  11/07/1998    Pneumococcal 65+ Low/Medium Risk (2 of 2 - PCV13) 10/29/2015    EYE EXAM RETINAL OR DILATED Q1  02/14/2016    GLAUCOMA SCREENING Q2Y  02/14/2017    LIPID PANEL Q1  10/10/2017    FOOT EXAM Q1  01/18/2018       Chief Complaint   Patient presents with    Diabetes     3 month follow up, feet always cold    Cholesterol Problem    Hypertension       1. Have you been to the ER, urgent care clinic since your last visit? Hospitalized since your last visit? No    2. Have you seen or consulted any other health care providers outside of the [facility] since your last visit? Include any pap smears or colon screening. No    3) Do you have an Advance Directive on file? no    4) Are you interested in receiving information on Advance Directives? NO      Patient is accompanied by self I have received verbal consent from Sony Adams to discuss any/all medical information while they are present in the room. Sony Adams is a 77 y.o. male  who presents for routine immunizations. She denies any symptoms , reactions or allergies that would exclude them from being immunized today. Risks and adverse reactions were discussed and the VIS was given to them. All questions were addressed. She was observed for 10 min post injection. There were no reactions observed.     Hussain Christopher LPN

## 2018-06-04 NOTE — PROGRESS NOTES
Meng Cade is a 77 y.o. male and presents for annual Medicare Wellness Visit. Problem List: Reviewed with patient and discussed risk factors. Patient Active Problem List   Diagnosis Code    Hypertension I10    Hyperlipidemia E78.5    DJD (degenerative joint disease) M19.90    Vitamin D deficiency E55.9    Type II or unspecified type diabetes mellitus without mention of complication, not stated as uncontrolled E11.9    Type 2 diabetes mellitus without complication ([facility] [address] B63.1       Current medical providers:  Patient Care Team:  Courtney Cazares MD as PCP - General (Internal Medicine)  Tony Mccabe LPN  [facility]  Sanju Randall RN as Ambulatory Care Navigator (Internal Medicine)    PSH: Reviewed with patient  History reviewed. No pertinent surgical history. SH: Reviewed with patient  Social History   Substance Use Topics    Smoking status: Former Smoker     Packs/day: 2.00     Years: 20.00     Types: Cigarettes     Quit date: 1982    Smokeless tobacco: Never Used    Alcohol use No       FH: Reviewed with patient  Family History   Problem Relation Age of Onset    Hypertension Mother       at 78 yo   Mammie Muster Cancer Mother     Hypertension Father       at 80    Diabetes Sister    Mammie Muster Cancer Son      colon cancer    Diabetes Brother      15 siblings, most with DM    Diabetes Son     No Known Problems Son     No Known Problems Son     No Known Problems Son     No Known Problems Son     No Known Problems Son     No Known Problems Daughter        Medications/Allergies: Reviewed with patient  Current Outpatient Prescriptions on File Prior to Visit   Medication Sig Dispense Refill    SENNA PLUS 8.6-50 mg per tablet TAKE 1 TABLET BY MOUTH EVERY DAY 30 Tab 3    fluticasone (FLONASE) 50 mcg/actuation nasal spray 1 [place] by Both Nostrils route daily. 3 Bottle 2    donepezil (ARICEPT) 5 mg tablet Take 1 Tab by mouth nightly.  90 Tab 2    cholecalciferol (VITAMIN D3) 1,000 unit cap Take 1 Cap by mouth daily. 90 Cap 2    tamsulosin (FLOMAX) 0.4 mg capsule TAKE ONE CAPSULE BY MOUTH EVERY DAY 90 Cap 1    glipiZIDE SR (GLUCOTROL XL) 2.5 mg CR tablet TAKE 1 TABLET BY MOUTH EVERY DAY 90 Tab 1    metFORMIN (GLUCOPHAGE) 1,000 mg tablet Take 1 Tab by mouth two (2) times daily (with meals). 180 Tab 1    linagliptin (TRADJENTA) 5 mg tablet Take 1 Tab by mouth daily. 90 Tab 1    acetaminophen (TYLENOL) 500 mg tablet Take 1 Tab by mouth two (2) times daily as needed. 60 Tab 12    diclofenac (VOLTAREN) 1 % gel APPLY 2 GRAMS TO AFFECTED AREA TWO (2) TIMES A DAY. 100 g 3    lisinopril (PRINIVIL, ZESTRIL) 10 mg tablet Take 1 Tab by mouth daily. 90 Tab 2    aspirin 81 mg chewable tablet Take 1 Tab by mouth every other day. 30 Tab 12    meloxicam (MOBIC) 15 mg tablet Take 1 Tab by mouth daily. 30 Tab 5    simvastatin (ZOCOR) 10 mg tablet Take 1 Tab by mouth nightly. 90 Tab 3    triamcinolone (NASACORT AQ) 55 mcg nasal inhaler 1 [place] by Both Nostrils route nightly. 1 Bottle 3    IRON 325 mg (65 mg iron) tablet TAKE 1 TABLET BY MOUTH DAILY (BEFORE BREAKFAST). 30 tablet 1    OTHER 500 mg. [facility] pain relief      peg 400-propylene glycol (SYSTANE) 0.4-0.3 % Drop Administer 1 Drop to left eye as needed. No current facility-administered medications on file prior to visit. Allergies   Allergen Reactions    Amoxicillin Nausea and Vomiting    Hydrocod-Cpm-Pe-Acetaminophen Other (comments)     GI Upset    Seafood [Shellfish Containing Products] Rash       Objective:  Visit Vitals    /72 (BP 1 Location: Right arm, BP Patient Position: Sitting)    Pulse 69    Temp 98.3 °F (36.8 °C) (Oral)    Resp 20    Ht 5' 4\" (1.626 m)    Wt 141 lb 6.4 oz (64.1 kg)    SpO2 96%    BMI 24.27 kg/m2    Body mass index is 24.27 kg/(m^2).     Assessment of cognitive impairment: Alert and oriented x 2    Depression Screen:   PHQ over the last two weeks 2/26/2018   Little interest or pleasure in doing things Not at all   Feeling down, depressed or hopeless Not at all   Total Score PHQ 2 0       Fall Risk Assessment:    Fall Risk Assessment, last 12 mths 2/26/2018   Able to walk? Yes   Fall in past 12 months? No   Fall with injury? -   Number of falls in past 12 months -   Fall Risk Score -       Functional Ability:   Does the patient exhibit a steady gait?  no   How long did it take the patient to get up and walk from a sitting position? 30 sec   Is the patient self reliant?  (ie can do own laundry, meals, household chores)  yes     Does the patient handle his/her own medications? yes     Does the patient handle his/her own money? no     Is the patients home safe (ie good lighting, handrails on stairs and bath, etc.)? yes     Did you notice or did patient express any hearing difficulties? no     Did you notice or did patient express any vision difficulties?   no     Were distance and reading eye charts used? no       Advance Care Planning:   Patient was offered the opportunity to discuss advance care planning:  yes     Does patient have an Advance Directive:  no   If no, did you provide information on Caring Connections?   yes       Plan:      Orders Placed This Encounter    METABOLIC PANEL, COMPREHENSIVE    HEMOGLOBIN A1C WITH EAG    MICROALBUMIN, UR, RAND    SITagliptin-metFORMIN (JANUMET XR) 50-1,000 mg TM24    clobetasol (TEMOVATE) 0.05 % ointment    cyanocobalamin (VITAMIN B12) 500 mcg tablet    loratadine (CLARITIN) 10 mg tablet    gabapentin (NEURONTIN) 100 mg capsule       Health Maintenance   Topic Date Due    ZOSTER VACCINE AGE 60>  11/07/1998    Pneumococcal 65+ Low/Medium Risk (2 of 2 - PCV13) 10/29/2015    EYE EXAM RETINAL OR DILATED Q1  02/14/2016    GLAUCOMA SCREENING Q2Y  02/14/2017    LIPID PANEL Q1  10/10/2017    FOOT EXAM Q1  01/18/2018    Influenza Age 5 to Adult  08/01/2018    HEMOGLOBIN A1C Q6M  08/26/2018    MICROALBUMIN Q1  10/23/2018    MEDICARE YEARLY EXAM  06/05/2019    DTaP/Tdap/Td series (2 - Td) 06/12/2027       *Patient verbalized understanding and agreement with the plan. A copy of the After Visit Summary with personalized health plan was given to the patient today.

## 2018-06-04 NOTE — PROGRESS NOTES
HISTORY OF PRESENT ILLNESS  Meghna Mendes is a 77 y.o. male here for follow up . He is accompanied by his son. Report nasal congestion and postnasal drip. He feels sore throat. No cough no fever. Report tingling in both feet lately. No fall or injury. He is seen for diabetes. Migdalia Weldon He reports medication compliance: compliant all of the time. Diabetic diet compliance: compliant most of the time. Home glucose monitoring: is performed sporadically, values are usually normal. Further diabetic ROS: no polyuria or polydipsia, no chest pain, dyspnea or TIA's, no numbness, tingling or pain in extremities, no hypoglycemia. Lab review: labs are reviewed, up to date and normal, labs reviewed and discussed with patient. Eye exam: up to date. Reports pain in lower back. on voltarin gel. Also having knee pain. Ran out of Tylenol. Has HTN,BP is stable. Need lab work. Here for Medicare wellness visit, has no living will. Diabetes   Pertinent negatives include no chest pain. Cholesterol Problem   Pertinent negatives include no chest pain. Hypertension    Pertinent negatives include no chest pain, no palpitations and no blurred vision. Allergic Rhinitis   Pertinent negatives include no chest pain. Follow-up   Pertinent negatives include no chest pain. Review of Systems   Constitutional: Negative. Negative for chills and fever. HENT: Positive for congestion. Eyes: Negative. Negative for blurred vision and double vision. Respiratory: Negative. Cardiovascular: Negative. Negative for chest pain and palpitations. Gastrointestinal: Negative. Genitourinary: Negative. Musculoskeletal: Negative. Skin: Negative. Neurological: Positive for tingling. Psychiatric/Behavioral: Positive for memory loss. Physical Exam   Constitutional: He appears well-developed and well-nourished. No distress. HENT:   Head: Normocephalic and atraumatic.    Right Ear: External ear normal.   Left Ear: External ear normal.   Mouth/Throat: Oropharynx is clear and moist. No oropharyngeal exudate. Nasal turbinates:red inflamed,NT    Cobble stoning present. Neck: Normal range of motion. Neck supple. No JVD present. No thyromegaly present. Cardiovascular: Normal rate, regular rhythm, normal heart sounds and intact distal pulses. Pulmonary/Chest: Effort normal and breath sounds normal. No respiratory distress. He has no wheezes. Musculoskeletal: He exhibits tenderness. He exhibits no edema. Diabetic foot exam:     Left Foot:   Visual Exam: normal    Pulse DP: 2+ (normal)   Filament test: normal sensation    Vibratory sensation: normal      Right Foot:   Visual Exam: normal    Pulse DP: 2+ (normal)   Filament test: normal sensation    Vibratory sensation: normal   Psychiatric: He has a normal mood and affect. His behavior is normal.   Memory loss. ASSESSMENT and PLAN  Diagnoses and all orders for this visit:    1. Tingling    Already has a low vitamin B12. Will give vitamin B12 supplement. Probable diabetic neuropathy. Will start medications. 2. Type 2 diabetes mellitus without complication, without long-term current use of insulin (HCC)    Stable. Will refill,  -     SITagliptin-metFORMIN (JANUMET XR) 50-1,000 mg TM24; 2 tab po QD  -     METABOLIC PANEL, COMPREHENSIVE  -     HEMOGLOBIN A1C WITH EAG  -     MICROALBUMIN, UR, RAND    3. Dermatitis    We will refill,  -     clobetasol (TEMOVATE) 0.05 % ointment; Apply  to affected area two (2) times a day. 4. Vitamin B12 deficiency    Will call in,  -     cyanocobalamin (VITAMIN B12) 500 mcg tablet; Take 1 Tab by mouth daily. 5. Allergic rhinitis due to pollen, unspecified seasonality    Will give,  -     loratadine (CLARITIN) 10 mg tablet; Take 1 Tab by mouth daily as needed for Allergies.     6. Other diabetic neurological complication associated with type 2 diabetes mellitus ([facility] [address]    We will start,  -     gabapentin (NEURONTIN) 100 mg capsule; Take 1 Cap by mouth two (2) times a day. 7. Advance care planning    8. Encounter for immunization  -     PNEUMOCOCCAL CONJ VACCINE 13 VALENT IM        Issues reviewed with her: referral to Diabetic Education department, diabetic diet discussed in detail, written exchange diet given, home glucose monitoring emphasized, all medications, side effects and compliance discussed carefully, foot care discussed and Podiatry visits discussed, annual eye examinations at Ophthalmology discussed, long term diabetic complications discussed and labs immediately prior to next visit.

## 2018-06-04 NOTE — ACP (ADVANCE CARE PLANNING)

## 2018-06-04 NOTE — PATIENT INSTRUCTIONS
Schedule of Personalized Health Plan  (Provide Copy to Patient)  The best way to stay healthy is to live a healthy lifestyle. A healthy lifestyle includes regular exercise, eating a well-balanced diet, keeping a healthy weight and not smoking. Regular physical exams and screening tests are another important way to take care of yourself. Preventive exams provided by health care providers can find health problems early when treatment works best and can keep you from getting certain diseases or illnesses. Preventive services include exams, lab tests, screenings, shots, monitoring and information to help you take care of your own health. All people over 65 should have a pneumonia shot. Pneumonia shots are usually only needed once in a lifetime unless your doctor decides differently. Will give. All people over 65 should have a yearly flu shot. People over 65 are at medium to high risk for Hepatitis B. Three shots are needed for complete protection. In addition to your physical exam, some screening tests are recommended:    Bone mass measurement (dexa scan) is recommended every two years if you have certain risk factors, such as personal history of vertebral fracture or chronic steroid medication use. N/A    Diabetes Mellitus screening is recommended every year. up to date    Glaucoma is an eye disease caused by high pressure in the eye. An eye exam is recommended every year. Cardiovascular screening tests that check your cholesterol and other blood fat (lipid) levels are recommended every five years. Colorectal Cancer screening tests help to find pre-cancerous polyps (growths in the colon) so they can be removed before they turn into cancer. Tests ordered for screening depend on your personal and family history risk factors.     Screening for Prostate Cancer is recommended yearly with a digital rectal exam and/or a PSA test.N/A    Here is a list of your current Health Maintenance items with a due date:  Health Maintenance   Topic Date Due    ZOSTER VACCINE AGE 60>  11/07/1998    Pneumococcal 65+ Low/Medium Risk (2 of 2 - PCV13) 10/29/2015    EYE EXAM RETINAL OR DILATED Q1  02/14/2016    GLAUCOMA SCREENING Q2Y  02/14/2017    LIPID PANEL Q1  10/10/2017    FOOT EXAM Q1  01/18/2018    Influenza Age 5 to Adult  08/01/2018    HEMOGLOBIN A1C Q6M  08/26/2018    MICROALBUMIN Q1  10/23/2018    MEDICARE YEARLY EXAM  06/05/2019    DTaP/Tdap/Td series (2 - Td) 06/12/2027

## 2018-06-04 NOTE — MR AVS SNAPSHOT
[address] [facility] 102 1400 [address] [place] [address] 
[phone] Patient: Ora Boas MRN:  [id number] Visit Information Date & Time Provider Department Dept. Phone Encounter #  
 2018 11:30 AM Windy Amin MD [facility] Internal Medicine [phone] Upcoming Health Maintenance Date Due ZOSTER VACCINE AGE 60> 1998 Pneumococcal 65+ Low/Medium Risk (2 of 2 - PCV13) 10/29/2015 EYE EXAM RETINAL OR DILATED Q1 2016 GLAUCOMA SCREENING Q2Y 2017 LIPID PANEL Q1 10/10/2017 FOOT EXAM Q1 2018 Influenza Age 5 to Adult 2018 HEMOGLOBIN A1C Q6M 2018 MICROALBUMIN Q1 10/23/2018 MEDICARE YEARLY EXAM 2019 DTaP/Tdap/Td series (2 - Td) 2027 Allergies as of 2018  Review Complete On: 2018 By: Windy Amin MD  
  
 Severity Noted Reaction Type Reactions Amoxicillin  2012   Systemic Nausea and Vomiting Hydrocod-cpm-pe-acetaminophen  2010    Other (comments) GI Upset Seafood [Shellfish Containing Products]  2012   Topical Rash Current Immunizations  Reviewed on 2018 Name Date Influenza High Dose Vaccine PF 10/23/2017 Influenza Vaccine 2014,  Incomplete Influenza Vaccine Split 2012, 2011, 10/14/2010 Pneumococcal Conjugate (PCV-13)  Incomplete Pneumococcal Polysaccharide (PPSV-23) 10/29/2014 Reviewed by Windy Amin MD on 2018 at 12:04 PM  
 Reviewed by Windy Amin MD on 2018 at 12:04 PM  
You Were Diagnosed With   
  
 Codes Comments Tingling    -  Primary ICD-10-CM: R20.2 ICD-9-CM: 450. 0 Type 2 diabetes mellitus without complication, without long-term current use of insulin (HCC)     ICD-10-CM: E11.9 ICD-9-CM: 250.00 Dermatitis     ICD-10-CM: L30.9 ICD-9-CM: 692.9 Vitamin B12 deficiency     ICD-10-CM: E53.8 ICD-9-CM: 266.2 Allergic rhinitis due to pollen, unspecified seasonality     ICD-10-CM: J30.1 ICD-9-CM: 477.0 Other diabetic neurological complication associated with [facility]     ICD-10-CM: E11.49 
ICD-9-CM: 250.60 Advance care planning     ICD-10-CM: Z71.89 ICD-9-CM: V65.49 Encounter for immunization     ICD-10-CM: F94 ICD-9-CM: V03.89 Vitals BP Pulse Temp Resp Height(growth percentile) Weight(growth percentile) 168/72 (BP 1 Location: Right arm, BP Patient Position: Sitting) 69 98.3 °F (36.8 °C) (Oral) 20 5' 4\" (1.626 m) 141 lb 6.4 oz (64.1 kg) SpO2 BMI Smoking Status 96% 24.27 kg/m2 Former Smoker Vitals History BMI and BSA Data Body Mass Index Body Surface Area  
 24.27 kg/m 2 1.7 m 2 Preferred Pharmacy Pharmacy Name Phone [id number] [address] [phone] Your Updated Medication List  
  
   
This list is accurate as of 6/4/18 12:08 PM.  Always use your most recent med list.  
  
  
  
  
 acetaminophen 500 mg tablet Commonly known as:  TYLENOL Take 1 Tab by mouth two (2) times daily as needed. aspirin 81 mg chewable tablet Take 1 Tab by mouth every other day. cholecalciferol 1,000 unit Cap Commonly known as:  VITAMIN D3 Take 1 Cap by mouth daily. clobetasol 0.05 % ointment Commonly known as:  Darío Roof Apply  to affected area two (2) times a day. cyanocobalamin 500 mcg tablet Commonly known as:  VITAMIN B12 Take 1 Tab by mouth daily. diclofenac 1 % Gel Commonly known as:  VOLTAREN  
APPLY 2 GRAMS TO AFFECTED AREA TWO (2) TIMES A DAY. donepezil 5 mg tablet Commonly known as:  ARICEPT Take 1 Tab by mouth nightly. fluticasone 50 mcg/actuation nasal spray Commonly known as:  FLONASE  
1 [place] by Both Nostrils route daily. gabapentin 100 mg capsule Commonly known as:  NEURONTIN  
 Take 1 Cap by mouth two (2) times a day. glipiZIDE SR 2.5 mg CR tablet Commonly known as:  GLUCOTROL XL  
TAKE 1 TABLET BY MOUTH EVERY DAY Iron 325 mg (65 mg iron) tablet Generic drug:  ferrous sulfate TAKE 1 TABLET BY MOUTH DAILY (BEFORE BREAKFAST). linagliptin 5 mg tablet Commonly known as:  Mor Cutting Take 1 Tab by mouth daily. lisinopril 10 mg tablet Commonly known as:  Jena Dowdy Take 1 Tab by mouth daily. loratadine 10 mg tablet Commonly known as:  Giselle Badder Take 1 Tab by mouth daily as needed for Allergies. meloxicam 15 mg tablet Commonly known as:  MOBIC Take 1 Tab by mouth daily. metFORMIN 1,000 mg tablet Commonly known as:  GLUCOPHAGE Take 1 Tab by mouth two (2) times daily (with meals). OTHER  
500 mg. [facility] pain relief SENNA PLUS 8.6-50 mg per tablet Generic drug:  senna-docusate TAKE 1 TABLET BY MOUTH EVERY DAY  
  
 simvastatin 10 mg tablet Commonly known as:  ZOCOR Take 1 Tab by mouth nightly. SITagliptin-metFORMIN 50-1,000 mg Tm24 Commonly known as:  JANUMET XR  
2 tab po QD SYSTANE (PROPYLENE GLYCOL) 0.4-0.3 % Drop Generic drug:  peg 400-propylene glycol Administer 1 Drop to left eye as needed. tamsulosin 0.4 mg capsule Commonly known as:  FLOMAX TAKE ONE CAPSULE BY MOUTH EVERY DAY  
  
 triamcinolone 55 mcg nasal inhaler Commonly known as:  NASACORT AQ  
1 [address] by Both Nostrils route nightly. Prescriptions Sent to Pharmacy Refills SITagliptin-metFORMIN (JANUMET XR) 50-1,000 mg TM24 3 Si tab po QD Class: Normal  
 Pharmacy: [facility]/pharmacy #[id number] Ph #: [phone]  
 clobetasol (TEMOVATE) 0.05 % ointment 0 Sig: Apply  to affected area two (2) times a day. Class: Normal  
 Pharmacy: [facility]/pharmacy #[id number] Ph #: [phone] Route: Topical  
 cyanocobalamin (VITAMIN B12) 500 mcg tablet 0 Sig: Take 1 Tab by mouth daily. Class: Normal  
 [id number] [address] Ph #: [phone] Route: Oral  
 loratadine (CLARITIN) 10 mg tablet 0 Sig: Take 1 Tab by mouth daily as needed for Allergies. Class: Normal  
 [id number] [address] Ph #: [phone] Route: Oral  
 gabapentin (NEURONTIN) 100 mg capsule 3 Sig: Take 1 Cap by mouth two (2) times a day. Class: Normal  
 Pharmacy: [facility]/pharmacy #[id number] Ph #: [phone] Route: Oral  
  
We Performed the Following HEMOGLOBIN A1C WITH EAG [18218 CPT(R)] METABOLIC PANEL, COMPREHENSIVE [60620 CPT(R)] MICROALBUMIN, UR, RAND [id number] CPT(R)] PNEUMOCOCCAL CONJ VACCINE 13 VALENT IM [id number] CPT(R)] Patient Instructions Schedule of Personalized Health Plan (Provide Copy to Patient) The best way to stay healthy is to live a healthy lifestyle. A healthy lifestyle includes regular exercise, eating a well-balanced diet, keeping a healthy weight and not smoking. Regular physical exams and screening tests are another important way to take care of yourself. Preventive exams provided by health care providers can find health problems early when treatment works best and can keep you from getting certain diseases or illnesses. Preventive services include exams, lab tests, screenings, shots, monitoring and information to help you take care of your own health. All people over 65 should have a pneumonia shot. Pneumonia shots are usually only needed once in a lifetime unless your doctor decides differently. Will give. All people over 65 should have a yearly flu shot. People over 65 are at medium to high risk for Hepatitis B. Three shots are needed for complete protection. In addition to your physical exam, some screening tests are recommended: 
 
Bone mass measurement (dexa scan) is recommended every two years if you have certain risk factors, such as personal history of vertebral fracture or chronic steroid medication use. N/A Diabetes Mellitus screening is recommended every year. up to date Glaucoma is an eye disease caused by high pressure in the eye. An eye exam is recommended every year. Cardiovascular screening tests that check your cholesterol and other blood fat (lipid) levels are recommended every five years. Colorectal Cancer screening tests help to find pre-cancerous polyps (growths in the colon) so they can be removed before they turn into cancer. Tests ordered for screening depend on your personal and family history risk factors. Screening for Prostate Cancer is recommended yearly with a digital rectal exam and/or a PSA test.N/A Here is a list of your current Health Maintenance items with a due date: 
Health Maintenance Topic Date Due  
 ZOSTER VACCINE AGE 60>  11/07/1998  Pneumococcal 65+ Low/Medium Risk (2 of 2 - PCV13) 10/29/2015  
 EYE EXAM RETINAL OR DILATED Q1  02/14/2016  GLAUCOMA SCREENING Q2Y  02/14/2017  LIPID PANEL Q1  10/10/2017  
 FOOT EXAM Q1  01/18/2018  Influenza Age 5 to Adult  08/01/2018  HEMOGLOBIN A1C Q6M  08/26/2018  MICROALBUMIN Q1  10/23/2018  MEDICARE YEARLY EXAM  06/05/2019  
 DTaP/Tdap/Td series (2 - Td) 06/12/2027 Introducing [facility] & HE[facility]! Dear Zoraida Davila: 
Thank you for requesting a [employer] account. Our records indicate that you have previously registered for a [employer] account but its currently inactive. Please call our [employer] support line at [phone]. Additional Information If you have questions, please visit the Frequently Asked Questions section of the [employer] website at [email or web address] Remember, MyChart is NOT to be used for urgent needs. For medical emergencies, dial 911. Now available from your iPhone and Android! Please provide this summary of care documentation to your next provider. Your primary care clinician is listed as Marlene Gibson. If you have any questions after today's visit, please call [phone].

## 2018-06-04 NOTE — LETTER
6/11/2018 9:57 AM 
 
Mr. Bulmaro Montes 
[address] [place] [id number] Dear Bulmaro Monets: 
 
Please find your most recent results below. Resulted Orders METABOLIC PANEL, COMPREHENSIVE Result Value Ref Range Glucose 138 (H) 65 - 99 mg/dL BUN 17 8 - 27 mg/dL Creatinine 0.88 0.76 - 1.27 mg/dL GFR est non-AA 82 >59 mL/min/1.73 GFR est AA 94 >59 mL/min/1.73  
 BUN/Creatinine ratio 19 10 - 24 Sodium 145 (H) 134 - 144 mmol/L Potassium 4.1 3.5 - 5.2 mmol/L Chloride 104 96 - 106 mmol/L  
 CO2 31 (H) 18 - 29 mmol/L Comment: **Effective June 11, 2018 Carbon Dioxide, Total** 
  reference interval will be changing to: Age                  Male          Female 
     0 days   - 30 days         16 - 34        16 - 34 
    31 days   -  1 year         15 - 22        15 - 25 
     2 years  -  5 years        16 - 34        14 - 32 
     6 years  - 15 years        23 - 32        22 - 32 
               >12 years        21 - 32        18 - 34 Calcium 9.0 8.6 - 10.2 mg/dL Protein, total 6.6 6.0 - 8.5 g/dL Albumin 4.1 3.5 - 4.8 g/dL GLOBULIN, TOTAL 2.5 1.5 - 4.5 g/dL A-G Ratio 1.6 1.2 - 2.2 Bilirubin, total 0.2 0.0 - 1.2 mg/dL Alk. phosphatase 57 39 - 117 IU/L  
 AST (SGOT) 20 0 - 40 IU/L  
 ALT (SGPT) 14 0 - 44 IU/L Narrative Performed at:  [address]  [id number] : Loc Larson MD, Phone:  [phone] HEMOGLOBIN A1C WITH EAG Result Value Ref Range Hemoglobin A1c 8.6 (H) 4.8 - 5.6 % Comment:  
            Pre-diabetes: 5.7 - 6.4 Diabetes: >6.4 Glycemic control for adults with diabetes: <7.0 Estimated average glucose 200 mg/dL Narrative Performed at:  [address]  [id number] : Loc Larson MD, Phone:  [phone] MICROALBUMIN, UR, RAND Result Value Ref Range Microalbumin, urine <3.0 Not Estab. ug/mL Narrative Performed at:  [address]  [id number] : Tatyana Magaña MD, Phone:  [phone] RECOMMENDATIONS: 
Chris Feliciano your labs indicate that your diabetes is worse, I have increased your glipizide to 5mg everyday. Please be on a diabetic diet and exercise as tolerated. All other labs are stable Please call me if you have any questions: [phone] Sincerely, Namrata Gomez MD

## 2018-06-05 DIAGNOSIS — L30.9 DERMATITIS: Primary | ICD-10-CM

## 2018-06-05 LAB
ALBUMIN SERPL-MCNC: 4.1 G/DL (ref 3.5–4.8)
ALBUMIN/GLOB SERPL: 1.6 {RATIO} (ref 1.2–2.2)
ALP SERPL-CCNC: 57 IU/L (ref 39–117)
ALT SERPL-CCNC: 14 IU/L (ref 0–44)
AST SERPL-CCNC: 20 IU/L (ref 0–40)
BILIRUB SERPL-MCNC: 0.2 MG/DL (ref 0–1.2)
BUN SERPL-MCNC: 17 MG/DL (ref 8–27)
BUN/CREAT SERPL: 19 (ref 10–24)
CALCIUM SERPL-MCNC: 9 MG/DL (ref 8.6–10.2)
CHLORIDE SERPL-SCNC: 104 MMOL/L (ref 96–106)
CO2 SERPL-SCNC: 31 MMOL/L (ref 18–29)
CREAT SERPL-MCNC: 0.88 MG/DL (ref 0.76–1.27)
EST. AVERAGE GLUCOSE BLD GHB EST-MCNC: 200 MG/DL
GFR SERPLBLD CREATININE-BSD FMLA CKD-EPI: 82 ML/MIN/1.73
GFR SERPLBLD CREATININE-BSD FMLA CKD-EPI: 94 ML/MIN/1.73
GLOBULIN SER CALC-MCNC: 2.5 G/DL (ref 1.5–4.5)
GLUCOSE SERPL-MCNC: 138 MG/DL (ref 65–99)
HBA1C MFR BLD: 8.6 % (ref 4.8–5.6)
MICROALBUMIN UR-MCNC: <3 UG/ML
POTASSIUM SERPL-SCNC: 4.1 MMOL/L (ref 3.5–5.2)
PROT SERPL-MCNC: 6.6 G/DL (ref 6–8.5)
SODIUM SERPL-SCNC: 145 MMOL/L (ref 134–144)

## 2018-06-05 RX ORDER — HYDROCORTISONE 25 MG/ML
LOTION TOPICAL 2 TIMES DAILY
Qty: 60 ML | Refills: 0 | Status: SHIPPED | OUTPATIENT
Start: 2018-06-05 | End: 2018-06-12 | Stop reason: ALTCHOICE

## 2018-06-06 DIAGNOSIS — E11.40 TYPE 2 DIABETES MELLITUS WITH DIABETIC NEUROPATHY, WITHOUT LONG-TERM CURRENT USE OF INSULIN (HCC): Primary | ICD-10-CM

## 2018-06-06 RX ORDER — GLIPIZIDE 5 MG/1
5 TABLET, FILM COATED, EXTENDED RELEASE ORAL DAILY
Qty: 30 TAB | Refills: 5 | Status: SHIPPED | OUTPATIENT
Start: 2018-06-06 | End: 2018-08-06 | Stop reason: SDUPTHER

## 2018-06-06 NOTE — PROGRESS NOTES
Diabetes is worse. Will increase Glucotrol XL to 5 mg once a day. Be on ADA diet and exercise. All other labs are stable.

## 2018-06-08 DIAGNOSIS — M15.9 PRIMARY OSTEOARTHRITIS INVOLVING MULTIPLE JOINTS: ICD-10-CM

## 2018-06-08 RX ORDER — DICLOFENAC SODIUM 10 MG/G
GEL TOPICAL
Qty: 100 G | Refills: 3 | Status: SHIPPED | OUTPATIENT
Start: 2018-06-08 | End: 2018-08-28 | Stop reason: SDUPTHER

## 2018-06-11 DIAGNOSIS — L30.9 DERMATITIS: ICD-10-CM

## 2018-06-11 RX ORDER — CLOBETASOL PROPIONATE 0.5 MG/G
OINTMENT TOPICAL
Qty: 15 G | Refills: 0 | Status: SHIPPED | OUTPATIENT
Start: 2018-06-11 | End: 2018-06-13 | Stop reason: CLARIF

## 2018-06-12 ENCOUNTER — TELEPHONE (OUTPATIENT)
Dept: INTERNAL MEDICINE CLINIC | Age: 78
End: 2018-06-12

## 2018-06-12 DIAGNOSIS — L30.9 DERMATITIS: ICD-10-CM

## 2018-06-12 RX ORDER — CLOBETASOL PROPIONATE 0.5 MG/G
OINTMENT TOPICAL
Qty: 15 G | Refills: 0 | Status: SHIPPED | OUTPATIENT
Start: 2018-06-12 | End: 2018-06-13 | Stop reason: CLARIF

## 2018-06-12 NOTE — TELEPHONE ENCOUNTER
Returning call to Sonam Mc regarding her father  She states it is ok to leave a message on the # [phone] if needed

## 2018-06-13 ENCOUNTER — TELEPHONE (OUTPATIENT)
Dept: INTERNAL MEDICINE CLINIC | Age: 78
End: 2018-06-13

## 2018-06-13 RX ORDER — BETAMETHASONE VALERATE 0.1 %
LOTION (ML) TOPICAL 2 TIMES DAILY
Qty: 60 ML | Refills: 1 | Status: SHIPPED | OUTPATIENT
Start: 2018-06-13 | End: 2018-10-01

## 2018-06-13 NOTE — TELEPHONE ENCOUNTER
Alternate med request:  Pharmacy cannot get betamethasone VA 0.1 right now.    Suggestions: triamcinolone 0.1% lotion or mometasone 0.1% lotion  [facility] can order the betamethasone  Please call pharmacy with answer

## 2018-06-13 NOTE — TELEPHONE ENCOUNTER
Navya Sorto MD   You 17 hours ago (2:53 PM)                 Changed to valisone ointment.  (Routing comment)

## 2018-06-14 RX ORDER — TRIAMCINOLONE ACETONIDE 1 MG/G
CREAM TOPICAL 2 TIMES DAILY
Qty: 15 G | Refills: 0 | Status: SHIPPED | OUTPATIENT
Start: 2018-06-14 | End: 2018-07-11 | Stop reason: SDUPTHER

## 2018-06-20 ENCOUNTER — TELEPHONE (OUTPATIENT)
Dept: INTERNAL MEDICINE CLINIC | Age: 78
End: 2018-06-20

## 2018-06-20 NOTE — TELEPHONE ENCOUNTER
----- Message from Gautam Mccain sent at 6/20/2018 12:15 PM EDT -----  Regarding: Dr Will Gomez with [facility] requesting call back to [phone]. Checking status of order for diabetic supplies that was faxed on 6/18/18.

## 2018-06-25 DIAGNOSIS — E11.9 TYPE 2 DIABETES MELLITUS WITHOUT COMPLICATION, WITHOUT LONG-TERM CURRENT USE OF INSULIN (HCC): ICD-10-CM

## 2018-07-11 RX ORDER — TRIAMCINOLONE ACETONIDE 1 MG/G
CREAM TOPICAL
Qty: 15 G | Refills: 0 | Status: SHIPPED | OUTPATIENT
Start: 2018-07-11 | End: 2018-08-13 | Stop reason: SDUPTHER

## 2018-08-06 DIAGNOSIS — E11.49 OTHER DIABETIC NEUROLOGICAL COMPLICATION ASSOCIATED WITH TYPE 2 DIABETES MELLITUS (HCC): ICD-10-CM

## 2018-08-06 DIAGNOSIS — E11.40 TYPE 2 DIABETES MELLITUS WITH DIABETIC NEUROPATHY, WITHOUT LONG-TERM CURRENT USE OF INSULIN (HCC): ICD-10-CM

## 2018-08-06 RX ORDER — GLIPIZIDE 5 MG/1
5 TABLET, FILM COATED, EXTENDED RELEASE ORAL DAILY
Qty: 90 TAB | Refills: 1 | Status: SHIPPED | OUTPATIENT
Start: 2018-08-06 | End: 2019-01-02 | Stop reason: SDUPTHER

## 2018-08-06 RX ORDER — FLUTICASONE PROPIONATE 50 MCG
1 SPRAY, SUSPENSION (ML) NASAL DAILY
Qty: 3 BOTTLE | Refills: 2 | Status: SHIPPED | OUTPATIENT
Start: 2018-08-06 | End: 2019-04-25 | Stop reason: SDUPTHER

## 2018-08-06 RX ORDER — GABAPENTIN 100 MG/1
100 CAPSULE ORAL 2 TIMES DAILY
Qty: 180 CAP | Refills: 0 | Status: SHIPPED | OUTPATIENT
Start: 2018-08-06 | End: 2018-12-12 | Stop reason: SDUPTHER

## 2018-08-13 RX ORDER — TRIAMCINOLONE ACETONIDE 1 MG/G
CREAM TOPICAL
Qty: 15 G | Refills: 0 | Status: SHIPPED | OUTPATIENT
Start: 2018-08-13 | End: 2018-09-15 | Stop reason: SDUPTHER

## 2018-08-22 RX ORDER — LISINOPRIL 10 MG/1
TABLET ORAL
Qty: 90 TAB | Refills: 2 | Status: SHIPPED | OUTPATIENT
Start: 2018-08-22 | End: 2019-05-24 | Stop reason: SDUPTHER

## 2018-08-28 DIAGNOSIS — M15.9 PRIMARY OSTEOARTHRITIS INVOLVING MULTIPLE JOINTS: ICD-10-CM

## 2018-08-28 RX ORDER — DICLOFENAC SODIUM 10 MG/G
GEL TOPICAL
Qty: 100 G | Refills: 3 | Status: SHIPPED | OUTPATIENT
Start: 2018-08-28 | End: 2018-11-14

## 2018-08-28 NOTE — TELEPHONE ENCOUNTER
Last OV: 6-4-18  Next visit: 10-1-18  Last labs: 6-4-18    Refill request for diclofenac 1% gel 90 day supply forwarded to provider for approval

## 2018-09-03 DIAGNOSIS — E78.00 PURE HYPERCHOLESTEROLEMIA: ICD-10-CM

## 2018-09-04 DIAGNOSIS — K59.00 CONSTIPATION, UNSPECIFIED CONSTIPATION TYPE: ICD-10-CM

## 2018-09-04 RX ORDER — SIMVASTATIN 10 MG/1
TABLET, FILM COATED ORAL
Qty: 90 TAB | Refills: 3 | Status: SHIPPED | OUTPATIENT
Start: 2018-09-04 | End: 2019-02-20 | Stop reason: ALTCHOICE

## 2018-09-04 RX ORDER — AMOXICILLIN 250 MG
1 CAPSULE ORAL DAILY
Qty: 90 TAB | Refills: 1 | Status: SHIPPED | OUTPATIENT
Start: 2018-09-04 | End: 2019-03-24 | Stop reason: SDUPTHER

## 2018-09-17 RX ORDER — TRIAMCINOLONE ACETONIDE 1 MG/G
CREAM TOPICAL
Qty: 15 G | Refills: 0 | Status: SHIPPED | OUTPATIENT
Start: 2018-09-17 | End: 2018-10-01 | Stop reason: SDUPTHER

## 2018-09-25 DIAGNOSIS — M47.22 OSTEOARTHRITIS OF SPINE WITH RADICULOPATHY, CERVICAL REGION: ICD-10-CM

## 2018-09-26 DIAGNOSIS — I10 ESSENTIAL HYPERTENSION: ICD-10-CM

## 2018-09-26 RX ORDER — MELOXICAM 15 MG/1
15 TABLET ORAL DAILY
Qty: 30 TAB | Refills: 5 | Status: SHIPPED | OUTPATIENT
Start: 2018-09-26 | End: 2018-09-27 | Stop reason: SDUPTHER

## 2018-09-26 RX ORDER — GUAIFENESIN 100 MG/5ML
81 LIQUID (ML) ORAL EVERY OTHER DAY
Qty: 90 TAB | Refills: 3 | Status: SHIPPED | OUTPATIENT
Start: 2018-09-26 | End: 2019-12-23

## 2018-09-27 DIAGNOSIS — M47.22 OSTEOARTHRITIS OF SPINE WITH RADICULOPATHY, CERVICAL REGION: ICD-10-CM

## 2018-09-27 RX ORDER — MELOXICAM 15 MG/1
15 TABLET ORAL DAILY
Qty: 30 TAB | Refills: 5 | Status: SHIPPED | OUTPATIENT
Start: 2018-09-27 | End: 2018-10-05 | Stop reason: SDUPTHER

## 2018-10-01 ENCOUNTER — OFFICE VISIT (OUTPATIENT)
Dept: INTERNAL MEDICINE CLINIC | Age: 78
End: 2018-10-01

## 2018-10-01 VITALS
BODY MASS INDEX: 24.21 KG/M2 | RESPIRATION RATE: 15 BRPM | HEART RATE: 73 BPM | SYSTOLIC BLOOD PRESSURE: 165 MMHG | OXYGEN SATURATION: 98 % | DIASTOLIC BLOOD PRESSURE: 69 MMHG | WEIGHT: 141.8 LBS | HEIGHT: 64 IN

## 2018-10-01 DIAGNOSIS — E11.9 TYPE 2 DIABETES MELLITUS WITHOUT COMPLICATION, WITHOUT LONG-TERM CURRENT USE OF INSULIN (HCC): ICD-10-CM

## 2018-10-01 DIAGNOSIS — I10 ESSENTIAL HYPERTENSION: ICD-10-CM

## 2018-10-01 DIAGNOSIS — L25.9 CONTACT DERMATITIS, UNSPECIFIED CONTACT DERMATITIS TYPE, UNSPECIFIED TRIGGER: ICD-10-CM

## 2018-10-01 DIAGNOSIS — R82.90 ABNORMAL URINE: ICD-10-CM

## 2018-10-01 DIAGNOSIS — E11.40 TYPE 2 DIABETES MELLITUS WITH DIABETIC NEUROPATHY, WITHOUT LONG-TERM CURRENT USE OF INSULIN (HCC): Primary | ICD-10-CM

## 2018-10-01 DIAGNOSIS — E78.00 PURE HYPERCHOLESTEROLEMIA: ICD-10-CM

## 2018-10-01 DIAGNOSIS — Z23 ENCOUNTER FOR IMMUNIZATION: ICD-10-CM

## 2018-10-01 RX ORDER — TRIAMCINOLONE ACETONIDE 1 MG/G
CREAM TOPICAL 2 TIMES DAILY
Qty: 15 G | Refills: 0 | Status: SHIPPED | OUTPATIENT
Start: 2018-10-01 | End: 2018-11-11 | Stop reason: SDUPTHER

## 2018-10-01 RX ORDER — PREDNISONE 5 MG/1
TABLET ORAL
Qty: 21 TAB | Refills: 0 | Status: SHIPPED | OUTPATIENT
Start: 2018-10-01 | End: 2019-01-14 | Stop reason: ALTCHOICE

## 2018-10-01 NOTE — PROGRESS NOTES
Health Maintenance Due   Topic Date Due    Shingrix Vaccine Age 49> (1 of 2) 01/07/1989    EYE EXAM RETINAL OR DILATED Q1  02/14/2016    GLAUCOMA SCREENING Q2Y  02/14/2017    LIPID PANEL Q1  10/10/2017    Influenza Age 9 to Adult  08/01/2018       Chief Complaint   Patient presents with    Hypertension    Cholesterol Problem    Diabetes     c/o bilateral foot pain/soreness; states gabapentin is not helping    Rash     hands and back; c/o stinging; betamethasone not helping       1. Have you been to the ER, urgent care clinic since your last visit? Hospitalized since your last visit? No    2. Have you seen or consulted any other health care providers outside of the [facility] since your last visit? Include any pap smears or colon screening. No    3) Do you have an Advance Directive on file? no    4) Are you interested in receiving information on Advance Directives? NO      Patient is accompanied by son I have received verbal consent from Sony Adams to discuss any/all medical information while they are present in the room. Sony Adams is a 77 y.o. male  who presents for routine immunizations. She denies any symptoms , reactions or allergies that would exclude them from being immunized today. Risks and adverse reactions were discussed and the VIS was given to them. All questions were addressed. She was observed for 10 min post injection. There were no reactions observed.     Everton Lundberg LPN

## 2018-10-01 NOTE — PROGRESS NOTES
HISTORY OF PRESENT ILLNESS  Poli Chávez is a 77 y.o. male here for follow up . He is accompanied by his son. Report rash in both forearm for last 2 weeks, has itching sometimes. Using the topical cream, not helping him. He is seen for diabetes. Molly Fernando He reports medication compliance: compliant all of the time. Diabetic diet compliance: compliant most of the time. Home glucose monitoring: is performed sporadically, values are usually normal. Further diabetic ROS: no polyuria or polydipsia, no chest pain, dyspnea or TIA's, no numbness, tingling or pain in extremities, no hypoglycemia. Lab review: labs are reviewed, up to date and normal, labs reviewed and discussed with patient. Eye exam: up to date. Report tingling sensation in both feet. Has toe pain. Has HTN,BP is stable. Need lab work. Need flu shot. Hypertension    Pertinent negatives include no chest pain, no palpitations and no blurred vision. Cholesterol Problem   Pertinent negatives include no chest pain. Diabetes   Pertinent negatives include no chest pain. Rash    Associated symptoms include itching. Follow-up   Pertinent negatives include no chest pain. Review of Systems   Constitutional: Negative. Negative for chills and fever. HENT: Negative. Eyes: Negative. Negative for blurred vision and double vision. Respiratory: Negative. Cardiovascular: Negative. Negative for chest pain and palpitations. Gastrointestinal: Negative. Genitourinary: Negative. Musculoskeletal: Positive for joint pain. Skin: Positive for itching and rash. Neurological: Positive for tingling. Psychiatric/Behavioral: Positive for memory loss. Physical Exam   Constitutional: He appears well-developed and well-nourished. No distress. Neck: Normal range of motion. Neck supple. No JVD present. No thyromegaly present. Cardiovascular: Normal rate, regular rhythm, normal heart sounds and intact distal pulses. Pulmonary/Chest: Effort normal and breath sounds normal. No respiratory distress. He has no wheezes. Musculoskeletal: He exhibits no edema or tenderness. Both feet: Dorsalis pedis pulse normal.  Nontender foot. Skin:   Both forearms. Sporadic papular rash with cobblestoning. Itchy. Psychiatric: He has a normal mood and affect. His behavior is normal.   Memory loss. ASSESSMENT and PLAN  Diagnoses and all orders for this visit:    1. Type 2 diabetes mellitus with diabetic neuropathy, without long-term current use of insulin (HCC)    On multiple medications. Blood sugar stable. Will check,  -     METABOLIC PANEL, COMPREHENSIVE  -     HEMOGLOBIN A1C WITH EAG  -     URINALYSIS W/ RFLX MICROSCOPIC    2. Encounter for immunization     we will give,      -     Influenza Vaccine Inactivated (IIV)(FLUAD), Subunit, Adjuvanted, IM, (19272)  -     Administration fee () for Medicare insured patients    3. Pure hypercholesterolemia  Stable on current regimen. Will continue same. Will check,  -     METABOLIC PANEL, COMPREHENSIVE  -     LIPID PANEL    4. Essential hypertension    On medicine. Will check,   -     METABOLIC PANEL, COMPREHENSIVE    5. Type 2 diabetes mellitus without complication, without long-term current use of insulin (HCC)    will refill,  -     SITagliptin-metFORMIN (JANUMET XR) 50-1,000 mg TM24; 2 tab po QD    6. Contact dermatitis, unspecified contact dermatitis type, unspecified trigger    Will give,  -     predniSONE (STERAPRED) 5 mg dose pack; See administration instruction per 5mg dose pack  -     triamcinolone acetonide (KENALOG) 0.1 % topical cream; Apply  to affected area two (2) times a day. use thin layer    7.  Abnormal urine  -     URINALYSIS W/ RFLX MICROSCOPIC          Issues reviewed with her: referral to Diabetic Education department, diabetic diet discussed in detail, written exchange diet given, home glucose monitoring emphasized, all medications, side effects and compliance discussed carefully, foot care discussed and Podiatry visits discussed, annual eye examinations at Ophthalmology discussed, long term diabetic complications discussed and labs immediately prior to next visit.

## 2018-10-01 NOTE — MR AVS SNAPSHOT
[address] [facility] 102 [address] [place] [address] 
[phone] Patient: Thang Esparza MRN:  [id number] Visit Information Date & Time Provider Department Dept. Phone Encounter #  
 10/1/2018 10:15 AM Fátima Alves, [address] [facility] [phone] [id number] Upcoming Health Maintenance Date Due Shingrix Vaccine Age 50> (1 of 2) 1989 EYE EXAM RETINAL OR DILATED Q1 2016 GLAUCOMA SCREENING Q2Y 2017 LIPID PANEL Q1 10/10/2017 Influenza Age 5 to Adult 2018 HEMOGLOBIN A1C Q6M 2018 FOOT EXAM Q1 2019 MICROALBUMIN Q1 2019 MEDICARE YEARLY EXAM 2019 DTaP/Tdap/Td series (2 - Td) 2027 Allergies as of 10/1/2018  Review Complete On: 10/1/2018 By: Fátima Alves MD  
  
 Severity Noted Reaction Type Reactions Amoxicillin  2012   Systemic Nausea and Vomiting Hydrocod-cpm-pe-acetaminophen  2010    Other (comments) GI Upset Seafood [Shellfish Containing Products]  2012   Topical Rash Current Immunizations  Reviewed on 2018 Name Date Influenza High Dose Vaccine PF 10/23/2017 Influenza Vaccine 2014,  Incomplete Influenza Vaccine (Tri) Adjuvanted  Incomplete Influenza Vaccine Split 2012, 2011, 10/14/2010 Pneumococcal Conjugate (PCV-13) 2018 Pneumococcal Polysaccharide (PPSV-23) 10/29/2014 Not reviewed this visit You Were Diagnosed With   
  
 Codes Comments Type 2 diabetes mellitus with diabetic neuropathy, without long-term current use of insulin (HCC)    -  Primary ICD-10-CM: E11.40 ICD-9-CM: 250.60, 357.2 Encounter for immunization     ICD-10-CM: V86 ICD-9-CM: V03.89 Pure hypercholesterolemia     ICD-10-CM: E78.00 ICD-9-CM: 272.0 Essential hypertension     ICD-10-CM: I10 
ICD-9-CM: 401.9  Type 2 diabetes mellitus without complication, without long-term current use of insulin ([facility]     ICD-10-CM: E11.9 ICD-9-CM: 250.00 Contact dermatitis, unspecified contact dermatitis type, unspecified trigger     ICD-10-CM: L25.9 ICD-9-CM: 692.9 Vitals BP Pulse Resp Height(growth percentile) Weight(growth percentile) SpO2  
 165/69 (BP 1 Location: Right arm, BP Patient Position: Sitting) 73 15 5' 4\" (1.626 m) 141 lb 12.8 oz (64.3 kg) 98% BMI Smoking Status 24.34 kg/m2 Former Smoker Vitals History BMI and BSA Data Body Mass Index Body Surface Area  
 24.34 kg/m 2 1.7 m 2 Preferred Pharmacy Pharmacy Name Phone [facility]/PHARMACY #[id number] [address] [phone] Your Updated Medication List  
  
   
This list is accurate as of 10/1/18 10:27 AM.  Always use your most recent med list.  
  
  
  
  
 acetaminophen 500 mg tablet Commonly known as:  TYLENOL Take 1 Tab by mouth two (2) times daily as needed. aspirin 81 mg chewable tablet Take 1 Tab by mouth every other day. cholecalciferol 1,000 unit Cap Commonly known as:  VITAMIN D3 Take 1 Cap by mouth daily. cyanocobalamin 500 mcg tablet Commonly known as:  VITAMIN B12 Take 1 Tab by mouth daily. diclofenac 1 % Gel Commonly known as:  VOLTAREN  
APPLY 2 GRAMS TO AFFECTED AREA TWO (2) TIMES A DAY. donepezil 5 mg tablet Commonly known as:  ARICEPT Take 1 Tab by mouth nightly. fluticasone 50 mcg/actuation nasal spray Commonly known as:  FLONASE  
1 [place] by Both Nostrils route daily. gabapentin 100 mg capsule Commonly known as:  NEURONTIN Take 1 Cap by mouth two (2) times a day. glipiZIDE SR 5 mg CR tablet Commonly known as:  GLUCOTROL XL Take 1 Tab by mouth daily. Iron 325 mg (65 mg iron) tablet Generic drug:  ferrous sulfate TAKE 1 TABLET BY MOUTH DAILY (BEFORE BREAKFAST). linagliptin 5 mg tablet Commonly known as:  Kole Ni Take 1 Tab by mouth daily. lisinopril 10 mg tablet Commonly known as:  PRINIVIL, ZESTRIL  
TAKE 1 TAB BY MOUTH DAILY. loratadine 10 mg tablet Commonly known as:  Veronica Sidles Take 1 Tab by mouth daily as needed for Allergies. meloxicam 15 mg tablet Commonly known as:  MOBIC Take 1 Tab by mouth daily. metFORMIN 1,000 mg tablet Commonly known as:  GLUCOPHAGE Take 1 Tab by mouth two (2) times daily (with meals). OTHER  
500 mg. [facility] pain relief  
  
 predniSONE 5 mg dose pack Commonly known as:  STERAPRED See administration instruction per 5mg dose pack  
  
 senna-docusate 8.6-50 mg per tablet Commonly known as:  SENNA PLUS Take 1 Tab by mouth daily. simvastatin 10 mg tablet Commonly known as:  ZOCOR  
TAKE 1 TAB BY MOUTH NIGHTLY. SITagliptin-metFORMIN 50-1,000 mg Tm24 Commonly known as:  JANUMET XR  
2 tab po QD SYSTANE (PROPYLENE GLYCOL) 0.4-0.3 % Drop Generic drug:  peg 400-propylene glycol Administer 1 Drop to left eye as needed. tamsulosin 0.4 mg capsule Commonly known as:  FLOMAX TAKE ONE CAPSULE BY MOUTH EVERY DAY  
  
 * triamcinolone 55 mcg nasal inhaler Commonly known as:  NASACORT AQ  
1 [place] by Both Nostrils route nightly. * triamcinolone acetonide 0.1 % topical cream  
Commonly known as:  KENALOG Apply  to affected area two (2) times a day. use thin layer * Notice: This list has 2 medication(s) that are the same as other medications prescribed for you. Read the directions carefully, and ask your doctor or other care provider to review them with you. Prescriptions Sent to Pharmacy Refills SITagliptin-metFORMIN (JANUMET XR) 50-1,000 mg TM24 3 Si tab po QD Class: Normal  
 Pharmacy: [facility]/pharmacy #[id number] Ph #: [phone]  
 predniSONE (STERAPRED) 5 mg dose pack 0 Sig: See administration instruction per 5mg dose pack Class: Normal  
 Pharmacy: [facility]/pharmacy #[id number] Ph #: [phone]  
 triamcinolone acetonide (KENALOG) 0.1 % topical cream 0 Sig: Apply  to affected area two (2) times a day. use thin layer Class: Normal  
 Pharmacy: [facility]/pharmacy #[id number] Ph #: [phone] Route: Topical  
  
We Performed the Following ADMIN INFLUENZA VIRUS VAC [ HCPCS] HEMOGLOBIN A1C WITH EAG [58267 CPT(R)] INFLUENZA VACCINE INACTIVATED (IIV), SUBUNIT, ADJUVANTED, IM [id number] CPT(R)] LIPID PANEL [92001 CPT(R)] METABOLIC PANEL, COMPREHENSIVE [14735 CPT(R)] Patient Instructions Vaccine Information Statement Influenza (Flu) Vaccine (Inactivated or Recombinant): What you need to know Many Vaccine Information Statements are available in Cameroonian and other languages. See www.immunize.org/vis Hojas de Información Sobre Vacunas están disponibles en Español y en muchos otros idiomas. Visite www.immunize.org/vis 1. Why get vaccinated? Influenza (flu) is a contagious disease that spreads around the [place] every year, usually between October and May. Flu is caused by influenza viruses, and is spread mainly by coughing, sneezing, and close contact. Anyone can get flu. Flu strikes suddenly and can last several days. Symptoms vary by age, but can include: 
 fever/chills  sore throat  muscle aches  fatigue  cough  headache  runny or stuffy nose Flu can also lead to pneumonia and blood infections, and cause diarrhea and seizures in children. If you have a medical condition, such as heart or lung disease, flu can make it worse. Flu is more dangerous for some people. Infants and young children, people 72years of age and older, pregnant women, and people with certain health conditions or a weakened immune system are at greatest risk. Each year thousands of people in the [facility] die from flu, and many more are hospitalized. Flu vaccine can: 
 keep you from getting flu, 
 make flu less severe if you do get it, and 
 keep you from spreading flu to your family and other people. 2. Inactivated and recombinant flu vaccines A dose of flu vaccine is recommended every flu season. Children 6 months through 6years of age may need two doses during the same flu season. Everyone else needs only one dose each flu season. Some inactivated flu vaccines contain a very small amount of a mercury-based preservative called thimerosal. Studies have not shown thimerosal in vaccines to be harmful, but flu vaccines that do not contain thimerosal are available. There is no live flu virus in flu shots. They cannot cause the flu. There are many flu viruses, and they are always changing. Each year a new flu vaccine is made to protect against three or four viruses that are likely to cause disease in the upcoming flu season. But even when the vaccine doesnt exactly match these viruses, it may still provide some protection Flu vaccine cannot prevent: 
 flu that is caused by a virus not covered by the vaccine, or 
 illnesses that look like flu but are not. It takes about 2 weeks for protection to develop after vaccination, and protection lasts through the flu season. 3. Some people should not get this vaccine Tell the person who is giving you the vaccine:  If you have any severe, life-threatening allergies. If you ever had a life-threatening allergic reaction after a dose of flu vaccine, or have a severe allergy to any part of this vaccine, you may be advised not to get vaccinated. Most, but not all, types of flu vaccine contain a small amount of egg protein.  If you ever had Guillain-Barré Syndrome (also called GBS). Some people with a history of GBS should not get this vaccine.  This should be discussed with your doctor.  If you are not feeling well. It is usually okay to get flu vaccine when you have a mild illness, but you might be asked to come back when you feel better. 4. Risks of a vaccine reaction With any medicine, including vaccines, there is a chance of reactions. These are usually mild and go away on their own, but serious reactions are also possible. Most people who get a flu shot do not have any problems with it. Minor problems following a flu shot include:  
 soreness, redness, or swelling where the shot was given  hoarseness  sore, red or itchy eyes  cough  fever  aches  headache  itching  fatigue If these problems occur, they usually begin soon after the shot and last 1 or 2 days. More serious problems following a flu shot can include the following:  There may be a small increased risk of Guillain-Barré Syndrome (GBS) after inactivated flu vaccine. This risk has been estimated at 1 or 2 additional cases per million people vaccinated. This is much lower than the risk of severe complications from flu, which can be prevented by flu vaccine.  Young children who get the flu shot along with pneumococcal vaccine (PCV13) and/or DTaP vaccine at the same time might be slightly more likely to have a seizure caused by fever. Ask your doctor for more information. Tell your doctor if a child who is getting flu vaccine has ever had a seizure. Problems that could happen after any injected vaccine:  People sometimes faint after a medical procedure, including vaccination. Sitting or lying down for about 15 minutes can help prevent fainting, and injuries caused by a fall. Tell your doctor if you feel dizzy, or have vision changes or ringing in the ears.  Some people get severe pain in the shoulder and have difficulty moving the arm where a shot was given. This happens very rarely.  Any medication can cause a severe allergic reaction. Such reactions from a vaccine are very rare, estimated at about 1 in a million doses, and would happen within a few minutes to a few hours after the vaccination. As with any medicine, there is a very remote chance of a vaccine causing a serious injury or death. The safety of vaccines is always being monitored. For more information, visit: www.cdc.gov/vaccinesafety/ 
 
5. What if there is a serious reaction? What should I look for?  Look for anything that concerns you, such as signs of a severe allergic reaction, very high fever, or unusual behavior. Signs of a severe allergic reaction can include hives, swelling of the face and throat, difficulty breathing, a fast heartbeat, dizziness, and weakness  usually within a few minutes to a few hours after the vaccination. What should I do?  If you think it is a severe allergic reaction or other emergency that cant wait, call 9-1-1 and get the person to the nearest hospital. Otherwise, call your doctor.  Reactions should be reported to the Vaccine Adverse Event Reporting System (VAERS). Your doctor should file this report, or you can do it yourself through  the VAERS web site at www.vaers. [facility].gov, or by calling 2-533.173.5804. [employer] does not give medical advice. 6. The National Vaccine Injury Compensation Program 
 
The [facility] Vaccine Injury Compensation Program (VICP) is a federal program that was created to compensate people who may have been injured by certain vaccines. Persons who believe they may have been injured by a vaccine can learn about the program and about filing a claim by calling 1-347.579.5113 or visiting the [employer] website at www.[facility].gov/vaccinecompensation. There is a time limit to file a claim for compensation. 7. How can I learn more?  Ask your healthcare provider. He or she can give you the vaccine package insert or suggest other sources of information.  Call your local or state health department.  Contact the Centers for Disease Control and Prevention (CDC): 
- Call 2-952.277.8730 (1-800-CDC-INFO) or 
- Visit CDCs website at www.cdc.gov/flu Vaccine Information Statement Inactivated Influenza Vaccine 8/7/2015 
42 NELLI Woo [id number] Department of Health and [facility] Centers for Disease Control and Prevention Office Use Only Introducing hospitals & HEALTH SERVICES! Nai Laugna introduces Aidin patient portal. Now you can access parts of your medical record, email your doctor's office, and request medication refills online. 1. In your internet browser, go to [email or web address] 2. Click on the First Time User? Click Here link in the Sign In box. You will see the New Member Sign Up page. 3. Enter your Aidin Access Code exactly as it appears below. You will not need to use this code after youve completed the sign-up process. If you do not sign up before the expiration date, you must request a new code. · Aidin Access Code: [email or web address] Expires: 12/30/2018 10:27 AM 
 
4. Enter the last four digits of your Social Security Number (xxxx) and Date of Birth (mm/dd/yyyy) as indicated and click Submit. You will be taken to the next sign-up page. 5. Create a Aidin ID. This will be your Aidin login ID and cannot be changed, so think of one that is secure and easy to remember. 6. Create a Aidin password. You can change your password at any time. 7. Enter your Password Reset Question and Answer. This can be used at a later time if you forget your password. 8. Enter your e-mail address. You will receive e-mail notification when new information is available in [address] 9. Click Sign Up. You can now view and download portions of your medical record. 10. Click the Download Summary menu link to download a portable copy of your medical information. If you have questions, please visit the Frequently Asked Questions section of the [facility] website. Remember, [employer]buy is NOT to be used for urgent needs. For medical emergencies, dial 911. Now available from your iPhone and Android! Please provide this summary of care documentation to your next provider. Your primary care clinician is listed as Berea Coupe. If you have any questions after today's visit, please call [phone].

## 2018-10-01 NOTE — PATIENT INSTRUCTIONS
Vaccine Information Statement    Influenza (Flu) Vaccine (Inactivated or Recombinant): What you need to know    Many Vaccine Information Statements are available in Chinese and other languages. See www.immunize.org/vis  Hojas de Información Sobre Vacunas están disponibles en Español y en muchos otros idiomas. Visite www.immunize.org/vis    1. Why get vaccinated? Influenza (flu) is a contagious disease that spreads around the [place] every year, usually between October and May. Flu is caused by influenza viruses, and is spread mainly by coughing, sneezing, and close contact. Anyone can get flu. Flu strikes suddenly and can last several days. Symptoms vary by age, but can include:   fever/chills   sore throat   muscle aches   fatigue   cough   headache    runny or stuffy nose    Flu can also lead to pneumonia and blood infections, and cause diarrhea and seizures in children. If you have a medical condition, such as heart or lung disease, flu can make it worse. Flu is more dangerous for some people. Infants and young children, people 72years of age and older, pregnant women, and people with certain health conditions or a weakened immune system are at greatest risk. Each year thousands of people in the [facility] die from flu, and many more are hospitalized. Flu vaccine can:   keep you from getting flu,   make flu less severe if you do get it, and   keep you from spreading flu to your family and other people. 2. Inactivated and recombinant flu vaccines    A dose of flu vaccine is recommended every flu season. Children 6 months through 6years of age may need two doses during the same flu season. Everyone else needs only one dose each flu season.        Some inactivated flu vaccines contain a very small amount of a mercury-based preservative called thimerosal. Studies have not shown thimerosal in vaccines to be harmful, but flu vaccines that do not contain thimerosal are available. There is no live flu virus in flu shots. They cannot cause the flu. There are many flu viruses, and they are always changing. Each year a new flu vaccine is made to protect against three or four viruses that are likely to cause disease in the upcoming flu season. But even when the vaccine doesnt exactly match these viruses, it may still provide some protection    Flu vaccine cannot prevent:   flu that is caused by a virus not covered by the vaccine, or   illnesses that look like flu but are not. It takes about 2 weeks for protection to develop after vaccination, and protection lasts through the flu season. 3. Some people should not get this vaccine    Tell the person who is giving you the vaccine:     If you have any severe, life-threatening allergies. If you ever had a life-threatening allergic reaction after a dose of flu vaccine, or have a severe allergy to any part of this vaccine, you may be advised not to get vaccinated. Most, but not all, types of flu vaccine contain a small amount of egg protein.  If you ever had Guillain-Barré Syndrome (also called GBS). Some people with a history of GBS should not get this vaccine. This should be discussed with your doctor.  If you are not feeling well. It is usually okay to get flu vaccine when you have a mild illness, but you might be asked to come back when you feel better. 4. Risks of a vaccine reaction    With any medicine, including vaccines, there is a chance of reactions. These are usually mild and go away on their own, but serious reactions are also possible. Most people who get a flu shot do not have any problems with it.      Minor problems following a flu shot include:    soreness, redness, or swelling where the shot was given     hoarseness   sore, red or itchy eyes   cough   fever   aches   headache   itching   fatigue  If these problems occur, they usually begin soon after the shot and last 1 or 2 days. More serious problems following a flu shot can include the following:     There may be a small increased risk of Guillain-Barré Syndrome (GBS) after inactivated flu vaccine. This risk has been estimated at 1 or 2 additional cases per million people vaccinated. This is much lower than the risk of severe complications from flu, which can be prevented by flu vaccine.  Young children who get the flu shot along with pneumococcal vaccine (PCV13) and/or DTaP vaccine at the same time might be slightly more likely to have a seizure caused by fever. Ask your doctor for more information. Tell your doctor if a child who is getting flu vaccine has ever had a seizure. Problems that could happen after any injected vaccine:      People sometimes faint after a medical procedure, including vaccination. Sitting or lying down for about 15 minutes can help prevent fainting, and injuries caused by a fall. Tell your doctor if you feel dizzy, or have vision changes or ringing in the ears.  Some people get severe pain in the shoulder and have difficulty moving the arm where a shot was given. This happens very rarely.  Any medication can cause a severe allergic reaction. Such reactions from a vaccine are very rare, estimated at about 1 in a million doses, and would happen within a few minutes to a few hours after the vaccination. As with any medicine, there is a very remote chance of a vaccine causing a serious injury or death. The safety of vaccines is always being monitored. For more information, visit: www.cdc.gov/vaccinesafety/    5. What if there is a serious reaction? What should I look for?  Look for anything that concerns you, such as signs of a severe allergic reaction, very high fever, or unusual behavior.     Signs of a severe allergic reaction can include hives, swelling of the face and throat, difficulty breathing, a fast heartbeat, dizziness, and weakness - usually within a few minutes to a few hours after the vaccination. What should I do?  If you think it is a severe allergic reaction or other emergency that cant wait, call 9-1-1 and get the person to the nearest hospital. Otherwise, call your doctor.  Reactions should be reported to the Vaccine Adverse Event Reporting System (VAERS). Your doctor should file this report, or you can do it yourself through  the VAERS web site at www.vaers. [facility].gov, or by calling 3-499.249.9642. VAERS does not give medical advice. 6. The National Vaccine Injury Compensation Program    The [facility] Vaccine Injury Compensation Program (VICP) is a federal program that was created to compensate people who may have been injured by certain vaccines. Persons who believe they may have been injured by a vaccine can learn about the program and about filing a claim by calling 4-953.178.5160 or visiting the [employer] website at www.[facility].gov/vaccinecompensation. There is a time limit to file a claim for compensation. 7. How can I learn more?  Ask your healthcare provider. He or she can give you the vaccine package insert or suggest other sources of information.  Call your local or state health department.  Contact the Centers for Disease Control and Prevention (CDC):  - Call 0-665.855.8956 (1-800-CDC-INFO) or  - Visit CDCs website at www.cdc.gov/flu    Vaccine Information Statement   Inactivated Influenza Vaccine   8/7/2015  42 NELLI Magaña [id number]    Department of Health and Human Services  Centers for Disease Control and Prevention    Office Use Only

## 2018-10-02 LAB
ALBUMIN SERPL-MCNC: 4.2 G/DL (ref 3.5–4.8)
ALBUMIN/GLOB SERPL: 1.5 {RATIO} (ref 1.2–2.2)
ALP SERPL-CCNC: 56 IU/L (ref 39–117)
ALT SERPL-CCNC: 11 IU/L (ref 0–44)
APPEARANCE UR: CLEAR
AST SERPL-CCNC: 21 IU/L (ref 0–40)
BILIRUB SERPL-MCNC: 0.2 MG/DL (ref 0–1.2)
BILIRUB UR QL STRIP: NEGATIVE
BUN SERPL-MCNC: 12 MG/DL (ref 8–27)
BUN/CREAT SERPL: 13 (ref 10–24)
CALCIUM SERPL-MCNC: 9.5 MG/DL (ref 8.6–10.2)
CHLORIDE SERPL-SCNC: 103 MMOL/L (ref 96–106)
CHOLEST SERPL-MCNC: 156 MG/DL (ref 100–199)
CO2 SERPL-SCNC: 29 MMOL/L (ref 20–29)
COLOR UR: YELLOW
CREAT SERPL-MCNC: 0.93 MG/DL (ref 0.76–1.27)
EST. AVERAGE GLUCOSE BLD GHB EST-MCNC: 154 MG/DL
GLOBULIN SER CALC-MCNC: 2.8 G/DL (ref 1.5–4.5)
GLUCOSE SERPL-MCNC: 125 MG/DL (ref 65–99)
GLUCOSE UR QL: NEGATIVE
HBA1C MFR BLD: 7 % (ref 4.8–5.6)
HDLC SERPL-MCNC: 54 MG/DL
HGB UR QL STRIP: NEGATIVE
INTERPRETATION, 910389: NORMAL
KETONES UR QL STRIP: NEGATIVE
LDLC SERPL CALC-MCNC: 89 MG/DL (ref 0–99)
LEUKOCYTE ESTERASE UR QL STRIP: NEGATIVE
Lab: NORMAL
MICRO URNS: NORMAL
NITRITE UR QL STRIP: NEGATIVE
PH UR STRIP: 7.5 [PH] (ref 5–7.5)
POTASSIUM SERPL-SCNC: 4 MMOL/L (ref 3.5–5.2)
PROT SERPL-MCNC: 7 G/DL (ref 6–8.5)
PROT UR QL STRIP: NEGATIVE
SODIUM SERPL-SCNC: 146 MMOL/L (ref 134–144)
SP GR UR: 1.01 (ref 1–1.03)
TRIGL SERPL-MCNC: 66 MG/DL (ref 0–149)
UROBILINOGEN UR STRIP-MCNC: 0.2 MG/DL (ref 0.2–1)
VLDLC SERPL CALC-MCNC: 13 MG/DL (ref 5–40)

## 2018-10-02 NOTE — PROGRESS NOTES
1.  Hemoglobin A1c 7.0, improved from 4 months ago. Encourage patient to eat a healther Mediterranean style diet with 55% or less of calories from carbohydrates. Try to eat more vegetables, whole fruit, nuts, whole grains, yogurt and less refined grains. Eat less red meat. Chicken and fish would be good protein sources. Aim to eat less than 45 grams of carbohydrates per meal.  2.  NA is elevated. Watch salt intake. Other labs stable.

## 2018-10-05 DIAGNOSIS — M47.22 OSTEOARTHRITIS OF SPINE WITH RADICULOPATHY, CERVICAL REGION: ICD-10-CM

## 2018-10-05 DIAGNOSIS — E11.9 TYPE 2 DIABETES MELLITUS WITHOUT COMPLICATION, WITHOUT LONG-TERM CURRENT USE OF INSULIN (HCC): ICD-10-CM

## 2018-10-05 RX ORDER — MELOXICAM 15 MG/1
15 TABLET ORAL DAILY
Qty: 30 TAB | Refills: 5 | Status: SHIPPED | OUTPATIENT
Start: 2018-10-05 | End: 2018-10-23 | Stop reason: SDUPTHER

## 2018-10-10 RX ORDER — TAMSULOSIN HYDROCHLORIDE 0.4 MG/1
CAPSULE ORAL
Qty: 90 CAP | Refills: 1 | Status: SHIPPED | OUTPATIENT
Start: 2018-10-10 | End: 2019-04-07 | Stop reason: SDUPTHER

## 2018-10-22 ENCOUNTER — TELEPHONE (OUTPATIENT)
Dept: INTERNAL MEDICINE CLINIC | Age: 78
End: 2018-10-22

## 2018-10-22 NOTE — TELEPHONE ENCOUNTER
Lisa called and said that she missed a call from the office. I dont see any documentation, so it might have been an automated call. . Please call her back if necessary.

## 2018-10-23 DIAGNOSIS — M47.22 OSTEOARTHRITIS OF SPINE WITH RADICULOPATHY, CERVICAL REGION: ICD-10-CM

## 2018-10-24 RX ORDER — MELOXICAM 15 MG/1
15 TABLET ORAL DAILY
Qty: 30 TAB | Refills: 5 | Status: SHIPPED | OUTPATIENT
Start: 2018-10-24 | End: 2019-11-13 | Stop reason: SDUPTHER

## 2018-10-30 NOTE — PROGRESS NOTES
Called pt's son and advised him of the pt's lab results. He voiced full understanding. Will mail a copy of the pt's las.

## 2018-11-11 DIAGNOSIS — L25.9 CONTACT DERMATITIS, UNSPECIFIED CONTACT DERMATITIS TYPE, UNSPECIFIED TRIGGER: ICD-10-CM

## 2018-11-12 RX ORDER — TRIAMCINOLONE ACETONIDE 1 MG/G
CREAM TOPICAL 2 TIMES DAILY
Qty: 15 G | Refills: 0 | Status: SHIPPED | OUTPATIENT
Start: 2018-11-12 | End: 2018-12-14 | Stop reason: SDUPTHER

## 2018-11-13 ENCOUNTER — TELEPHONE (OUTPATIENT)
Dept: INTERNAL MEDICINE CLINIC | Age: 78
End: 2018-11-13

## 2018-11-14 DIAGNOSIS — M15.9 PRIMARY OSTEOARTHRITIS INVOLVING MULTIPLE JOINTS: ICD-10-CM

## 2018-11-14 RX ORDER — DICLOFENAC SODIUM 10 MG/G
GEL TOPICAL
Qty: 300 G | Refills: 3 | Status: SHIPPED | OUTPATIENT
Start: 2018-11-14 | End: 2019-06-25 | Stop reason: SDUPTHER

## 2018-11-14 NOTE — TELEPHONE ENCOUNTER
Per VO Dr. Stepan Salinas MD, Diclofenac (Voltaren) 1% gel, apply 2 grams to affected area two (2) times a day, Qty 300g Refills 3. escribed to [facility].  RBV

## 2018-11-24 DIAGNOSIS — E11.9 TYPE 2 DIABETES MELLITUS WITHOUT COMPLICATION, WITHOUT LONG-TERM CURRENT USE OF INSULIN (HCC): ICD-10-CM

## 2018-11-26 RX ORDER — LINAGLIPTIN 5 MG/1
TABLET, FILM COATED ORAL
Qty: 90 TAB | Refills: 1 | Status: SHIPPED | OUTPATIENT
Start: 2018-11-26 | End: 2019-05-24 | Stop reason: SDUPTHER

## 2018-12-12 DIAGNOSIS — E11.49 OTHER DIABETIC NEUROLOGICAL COMPLICATION ASSOCIATED WITH TYPE 2 DIABETES MELLITUS (HCC): ICD-10-CM

## 2018-12-12 RX ORDER — GABAPENTIN 100 MG/1
CAPSULE ORAL
Qty: 180 CAP | Refills: 0 | Status: SHIPPED | OUTPATIENT
Start: 2018-12-12 | End: 2019-03-08 | Stop reason: SDUPTHER

## 2018-12-14 DIAGNOSIS — L25.9 CONTACT DERMATITIS, UNSPECIFIED CONTACT DERMATITIS TYPE, UNSPECIFIED TRIGGER: ICD-10-CM

## 2018-12-17 RX ORDER — TRIAMCINOLONE ACETONIDE 1 MG/G
CREAM TOPICAL 2 TIMES DAILY
Qty: 15 G | Refills: 0 | Status: SHIPPED | OUTPATIENT
Start: 2018-12-17 | End: 2019-01-13 | Stop reason: SDUPTHER

## 2019-01-02 DIAGNOSIS — F02.80 ALZHEIMER'S DEMENTIA WITHOUT BEHAVIORAL DISTURBANCE, UNSPECIFIED TIMING OF DEMENTIA ONSET: ICD-10-CM

## 2019-01-02 DIAGNOSIS — E11.40 TYPE 2 DIABETES MELLITUS WITH DIABETIC NEUROPATHY, WITHOUT LONG-TERM CURRENT USE OF INSULIN (HCC): ICD-10-CM

## 2019-01-02 DIAGNOSIS — G30.9 ALZHEIMER'S DEMENTIA WITHOUT BEHAVIORAL DISTURBANCE, UNSPECIFIED TIMING OF DEMENTIA ONSET: ICD-10-CM

## 2019-01-02 RX ORDER — GLIPIZIDE 5 MG/1
TABLET, FILM COATED, EXTENDED RELEASE ORAL
Qty: 90 TAB | Refills: 1 | Status: SHIPPED | OUTPATIENT
Start: 2019-01-02 | End: 2019-09-17 | Stop reason: SDUPTHER

## 2019-01-02 RX ORDER — DONEPEZIL HYDROCHLORIDE 5 MG/1
5 TABLET, FILM COATED ORAL
Qty: 90 TAB | Refills: 2 | Status: SHIPPED | OUTPATIENT
Start: 2019-01-02 | End: 2019-08-05 | Stop reason: SDUPTHER

## 2019-01-13 DIAGNOSIS — L25.9 CONTACT DERMATITIS, UNSPECIFIED CONTACT DERMATITIS TYPE, UNSPECIFIED TRIGGER: ICD-10-CM

## 2019-01-14 ENCOUNTER — HOSPITAL ENCOUNTER (OUTPATIENT)
Dept: LAB | Age: 79
Discharge: HOME | End: 2019-01-14
Payer: MEDICARE

## 2019-01-14 ENCOUNTER — OFFICE VISIT (OUTPATIENT)
Dept: INTERNAL MEDICINE CLINIC | Age: 79
End: 2019-01-14

## 2019-01-14 VITALS
WEIGHT: 149.8 LBS | HEART RATE: 85 BPM | SYSTOLIC BLOOD PRESSURE: 169 MMHG | HEIGHT: 64 IN | TEMPERATURE: 97.2 F | RESPIRATION RATE: 15 BRPM | DIASTOLIC BLOOD PRESSURE: 71 MMHG | OXYGEN SATURATION: 95 % | BODY MASS INDEX: 25.57 KG/M2

## 2019-01-14 DIAGNOSIS — E78.00 PURE HYPERCHOLESTEROLEMIA: ICD-10-CM

## 2019-01-14 DIAGNOSIS — I10 ESSENTIAL HYPERTENSION: Primary | ICD-10-CM

## 2019-01-14 DIAGNOSIS — R26.9 GAIT ABNORMALITY: ICD-10-CM

## 2019-01-14 DIAGNOSIS — E11.9 TYPE 2 DIABETES MELLITUS WITHOUT COMPLICATION, WITHOUT LONG-TERM CURRENT USE OF INSULIN (HCC): ICD-10-CM

## 2019-01-14 DIAGNOSIS — T78.1XXA ALLERGIC REACTION TO FOOD, INITIAL ENCOUNTER: ICD-10-CM

## 2019-01-14 DIAGNOSIS — L30.9 DERMATITIS: ICD-10-CM

## 2019-01-14 PROCEDURE — 36415 COLL VENOUS BLD VENIPUNCTURE: CPT

## 2019-01-14 PROCEDURE — 83036 HEMOGLOBIN GLYCOSYLATED A1C: CPT

## 2019-01-14 PROCEDURE — 80053 COMPREHEN METABOLIC PANEL: CPT

## 2019-01-14 PROCEDURE — 86003 ALLG SPEC IGE CRUDE XTRC EA: CPT

## 2019-01-14 RX ORDER — TRIAMCINOLONE ACETONIDE 1 MG/G
CREAM TOPICAL
Qty: 15 G | Refills: 0 | Status: SHIPPED | OUTPATIENT
Start: 2019-01-14 | End: 2019-01-14 | Stop reason: ALTCHOICE

## 2019-01-14 RX ORDER — BETAMETHASONE VALERATE 1.2 MG/G
OINTMENT TOPICAL DAILY
Qty: 30 G | Refills: 2 | Status: SHIPPED | OUTPATIENT
Start: 2019-01-14 | End: 2019-09-16 | Stop reason: ALTCHOICE

## 2019-01-14 NOTE — PROGRESS NOTES
HISTORY OF PRESENT ILLNESS  Matt Flores is a [de-identified] y.o. male here for follow up . He is accompanied by his son. Report gait weakness and shaking on both knees. He is not able to walk much. Has gained over 7 pound weight. Report rash in both forearm for last 2 weeks, has itching sometimes. Using the topical cream, not helping him. He thinks he might be allergic to food. He is seen for diabetes. Edgardo Woo He reports medication compliance: compliant all of the time. Diabetic diet compliance: compliant most of the time. Home glucose monitoring: is performed sporadically, values are usually normal. Further diabetic ROS: no polyuria or polydipsia, no chest pain, dyspnea or TIA's, no numbness, tingling or pain in extremities, no hypoglycemia. Lab review: labs are reviewed, up to date and normal, labs reviewed and discussed with patient. Eye exam: up to date. Brought blood sugar monitor, blood sugar log reviewed, all are stable. By the date of the blood sugar monitor is not right. Has HTN,BP is stable. Need lab work. Hypertension    Pertinent negatives include no chest pain, no palpitations and no blurred vision. Cholesterol Problem   Pertinent negatives include no chest pain. Diabetes   Pertinent negatives include no chest pain. Rash    Associated symptoms include itching. Follow-up   Pertinent negatives include no chest pain. Review of Systems   Constitutional: Negative. Negative for chills and fever. HENT: Negative. Eyes: Negative. Negative for blurred vision and double vision. Respiratory: Negative. Cardiovascular: Negative. Negative for chest pain and palpitations. Gastrointestinal: Negative. Genitourinary: Negative. Musculoskeletal: Positive for joint pain. Skin: Positive for itching and rash. Neurological: Positive for tingling. Psychiatric/Behavioral: Positive for memory loss. Physical Exam   Constitutional: He appears well-developed and well-nourished.  No distress. Neck: Normal range of motion. Neck supple. No JVD present. No thyromegaly present. Cardiovascular: Normal rate, regular rhythm, normal heart sounds and intact distal pulses. Pulmonary/Chest: Effort normal and breath sounds normal. No respiratory distress. He has no wheezes. Musculoskeletal: He exhibits no edema or tenderness. Both feet: Dorsalis pedis pulse normal.  Nontender foot. Skin:   Both forearms. Sporadic papular rash with cobblestoning. Itchy. Psychiatric: He has a normal mood and affect. His behavior is normal. Judgment and thought content normal.       ASSESSMENT and PLAN    Diagnoses and all orders for this visit:    1. Essential hypertension    On lisinopril. Stable. Will check,  -     METABOLIC PANEL, COMPREHENSIVE    2. Gait abnormality    He is shaky on his feet. Stay alone at home. Will be safe with a walker. Will order,  -     Walker (ULTRA-LIGHT ROLLATOR) misc; Use it everyday    3. Type 2 diabetes mellitus without complication, without long-term current use of insulin (HCC)    Last A1c was 7. Stable with all medications. Will check,  -     METABOLIC PANEL, COMPREHENSIVE  -     HEMOGLOBIN A1C WITH EAG  -     SITagliptin-metFORMIN (JANUMET XR) 50-1,000 mg TM24; 2 tab po QD    4. Pure hypercholesterolemia  On statin. Will check,    -     METABOLIC PANEL, COMPREHENSIVE    5. Allergic reaction to food, initial encounter    Dermatitis is not getting better. He thinks he might have food allergy. Will check,  -     FOOD ALLERGY PROFILE    6. Dermatitis    Was on Kenalog generic. Will change,  -     betamethasone valerate (VALISONE) 0.1 % ointment; Apply  to affected area daily.  D/C triamcelone    Issues reviewed with her: referral to Diabetic Education department, diabetic diet discussed in detail, written exchange diet given, home glucose monitoring emphasized, all medications, side effects and compliance discussed carefully, foot care discussed and Podiatry visits discussed, annual eye examinations at Ophthalmology discussed, long term diabetic complications discussed and labs immediately prior to next visit. Issues reviewed with her: referral to Diabetic Education department, diabetic diet discussed in detail, written exchange diet given, home glucose monitoring emphasized, all medications, side effects and compliance discussed carefully, foot care discussed and Podiatry visits discussed, annual eye examinations at Ophthalmology discussed, long term diabetic complications discussed and labs immediately prior to next visit.

## 2019-01-14 NOTE — PROGRESS NOTES
Health Maintenance Due   Topic Date Due    Shingrix Vaccine Age 49> (1 of 2) 01/07/1989    GLAUCOMA SCREENING Q2Y  02/14/2017    EYE EXAM RETINAL OR DILATED  02/14/2017       Chief Complaint   Patient presents with    Hypertension    Cholesterol Problem    Diabetes       1. Have you been to the ER, urgent care clinic since your last visit? Hospitalized since your last visit? No    2. Have you seen or consulted any other health care providers outside of the [facility] since your last visit? Include any pap smears or colon screening. No    3) Do you have an Advance Directive on file? no    4) Are you interested in receiving information on Advance Directives? NO      Patient is accompanied by son I have received verbal consent from [address] [place] [address] to discuss any/all medical information while they are present in the room.

## 2019-01-15 LAB
ALBUMIN SERPL-MCNC: 4.3 G/DL (ref 3.5–4.7)
ALBUMIN/GLOB SERPL: 1.6 {RATIO} (ref 1.2–2.2)
ALP SERPL-CCNC: 66 IU/L (ref 39–117)
ALT SERPL-CCNC: 15 IU/L (ref 0–44)
AST SERPL-CCNC: 17 IU/L (ref 0–40)
BILIRUB SERPL-MCNC: 0.3 MG/DL (ref 0–1.2)
BUN SERPL-MCNC: 16 MG/DL (ref 8–27)
BUN/CREAT SERPL: 17 (ref 10–24)
CALCIUM SERPL-MCNC: 9.1 MG/DL (ref 8.6–10.2)
CHLORIDE SERPL-SCNC: 105 MMOL/L (ref 96–106)
CO2 SERPL-SCNC: 28 MMOL/L (ref 20–29)
CREAT SERPL-MCNC: 0.93 MG/DL (ref 0.76–1.27)
EST. AVERAGE GLUCOSE BLD GHB EST-MCNC: 160 MG/DL
GLOBULIN SER CALC-MCNC: 2.7 G/DL (ref 1.5–4.5)
GLUCOSE SERPL-MCNC: 116 MG/DL (ref 65–99)
HBA1C MFR BLD: 7.2 % (ref 4.8–5.6)
POTASSIUM SERPL-SCNC: 4.3 MMOL/L (ref 3.5–5.2)
PROT SERPL-MCNC: 7 G/DL (ref 6–8.5)
SODIUM SERPL-SCNC: 146 MMOL/L (ref 134–144)

## 2019-01-16 LAB
CLAM IGE QN: <0.1 KU/L
CODFISH IGE QN: <0.1 KU/L
CORN IGE QN: <0.1 KU/L
COW MILK IGE QN: <0.1 KU/L
EGG WHITE IGE QN: <0.1 KU/L
Lab: ABNORMAL
PEANUT IGE QN: <0.1 KU/L
SCALLOP IGE QN: <0.1 KU/L
SESAME SEED IGE QN: <0.1 KU/L
SHRIMP IGE QN: 0.35 KU/L
SOYBEAN IGE QN: <0.1 KU/L
WALNUT IGE QN: <0.1 KU/L
WHEAT IGE QN: <0.1 KU/L

## 2019-01-17 ENCOUNTER — DOCUMENTATION ONLY (OUTPATIENT)
Dept: INTERNAL MEDICINE CLINIC | Age: 79
End: 2019-01-17

## 2019-01-25 ENCOUNTER — TELEPHONE (OUTPATIENT)
Dept: INTERNAL MEDICINE CLINIC | Age: 79
End: 2019-01-25

## 2019-01-27 RX ORDER — GLUCOSAMINE SULFATE 1500 MG
1000 POWDER IN PACKET (EA) ORAL DAILY
Qty: 90 CAP | Refills: 2 | Status: SHIPPED | OUTPATIENT
Start: 2019-01-27 | End: 2019-11-23 | Stop reason: SDUPTHER

## 2019-02-01 NOTE — TELEPHONE ENCOUNTER
Patients daughter is calling again to see if Stephanie Gutierres was authorized? Patient has been without this med since 1/25/19. She does not want him to change medicine since this one has been working so well for him. Please contact Lisa at [phone].  If no answer she would like for someone to leave a message

## 2019-02-01 NOTE — TELEPHONE ENCOUNTER
Left message on dgter VM stating PA was initiated 2 days ago and when writer called today, stated 24 more hours, writer made PA representative at insurance aware that pt has been without medication awaiting this PA, PA rep voiced understanding

## 2019-02-07 ENCOUNTER — TELEPHONE (OUTPATIENT)
Dept: INTERNAL MEDICINE CLINIC | Age: 79
End: 2019-02-07

## 2019-02-07 NOTE — TELEPHONE ENCOUNTER
----- Message from [address] sent at 2/7/2019  2:25 PM EST -----  Regarding: Dr. Zina Howard with [facility] pharmacy reported that Janumet XR needs prior authorization. Best contact number is [phone].

## 2019-02-11 NOTE — TELEPHONE ENCOUNTER
----- Message from [address] sent at 2/11/2019 10:50 AM EST -----  Regarding: Dr. Todd Lassiter  Pt's daughter, Rodríguez Pate, advised that the medication that was sent to the insurance for prior authorization was Janumet extended release but the pt takes [place]. She would like a callback to know if that is what he should be taking. Best contact number is [phone].

## 2019-02-12 NOTE — TELEPHONE ENCOUNTER
Call placed to amy and clarified that janumet XR is an extended release medication, amy voiced understanding

## 2019-02-13 ENCOUNTER — TELEPHONE (OUTPATIENT)
Dept: INTERNAL MEDICINE CLINIC | Age: 79
End: 2019-02-13

## 2019-02-20 ENCOUNTER — TELEPHONE (OUTPATIENT)
Dept: INTERNAL MEDICINE CLINIC | Age: 79
End: 2019-02-20

## 2019-02-20 RX ORDER — PRAVASTATIN SODIUM 20 MG/1
20 TABLET ORAL
Qty: 90 TAB | Refills: 1 | Status: SHIPPED | OUTPATIENT
Start: 2019-02-20 | End: 2019-08-14 | Stop reason: SDUPTHER

## 2019-02-20 NOTE — TELEPHONE ENCOUNTER
----- Message from Damari Alvarez sent at 2/20/2019 12:08 PM EST -----  Regarding: Dr. Jerri Lomas: [address] [place] [address] with [address]  is calling regarding Rx Janumet XR 50/1000 mg that was sent over for the pt but insurance will not cover it. Please send over Rx Janumet 50/1000 mg without the XR. The pt has been out of medication for a while.

## 2019-02-20 NOTE — PROGRESS NOTES
Requested Prescriptions     Signed Prescriptions Disp Refills    pravastatin (PRAVACHOL) 20 mg tablet 90 Tab 1     Sig: Take 1 Tab by mouth nightly. D/c simvastatin    PerDr. Piotr Ovalle, verbal order received.    Due to interaction between simvastatin and norvasc

## 2019-02-21 DIAGNOSIS — E11.9 TYPE 2 DIABETES MELLITUS WITHOUT COMPLICATION, WITHOUT LONG-TERM CURRENT USE OF INSULIN (HCC): Primary | ICD-10-CM

## 2019-03-08 DIAGNOSIS — E11.49 OTHER DIABETIC NEUROLOGICAL COMPLICATION ASSOCIATED WITH TYPE 2 DIABETES MELLITUS (HCC): ICD-10-CM

## 2019-03-11 RX ORDER — GABAPENTIN 100 MG/1
CAPSULE ORAL
Qty: 180 CAP | Refills: 1 | Status: SHIPPED | OUTPATIENT
Start: 2019-03-11 | End: 2019-09-16 | Stop reason: SDUPTHER

## 2019-03-17 DIAGNOSIS — L25.9 CONTACT DERMATITIS, UNSPECIFIED CONTACT DERMATITIS TYPE, UNSPECIFIED TRIGGER: ICD-10-CM

## 2019-03-18 RX ORDER — TRIAMCINOLONE ACETONIDE 1 MG/G
CREAM TOPICAL
Qty: 15 G | Refills: 0 | Status: SHIPPED | OUTPATIENT
Start: 2019-03-18 | End: 2019-05-13 | Stop reason: SDUPTHER

## 2019-03-24 DIAGNOSIS — K59.00 CONSTIPATION, UNSPECIFIED CONSTIPATION TYPE: ICD-10-CM

## 2019-03-25 RX ORDER — CETIRIZINE HYDROCHLORIDE, PSEUDOEPHEDRINE HYDROCHLORIDE 5; 120 MG/1; MG/1
TABLET, FILM COATED, EXTENDED RELEASE ORAL
Qty: 90 TAB | Refills: 1 | Status: SHIPPED | OUTPATIENT
Start: 2019-03-25

## 2019-04-08 RX ORDER — TAMSULOSIN HYDROCHLORIDE 0.4 MG/1
CAPSULE ORAL
Qty: 90 CAP | Refills: 1 | Status: SHIPPED | OUTPATIENT
Start: 2019-04-08 | End: 2019-07-15 | Stop reason: SDUPTHER

## 2019-04-25 RX ORDER — FLUTICASONE PROPIONATE 50 MCG
SPRAY, SUSPENSION (ML) NASAL
Qty: 1 BOTTLE | Refills: 2 | Status: SHIPPED | OUTPATIENT
Start: 2019-04-25 | End: 2019-07-18 | Stop reason: SDUPTHER

## 2019-05-13 ENCOUNTER — OFFICE VISIT (OUTPATIENT)
Dept: INTERNAL MEDICINE CLINIC | Age: 79
End: 2019-05-13

## 2019-05-13 ENCOUNTER — HOSPITAL ENCOUNTER (OUTPATIENT)
Dept: LAB | Age: 79
Discharge: HOME | End: 2019-05-13
Payer: MEDICARE

## 2019-05-13 VITALS
OXYGEN SATURATION: 98 % | BODY MASS INDEX: 25.99 KG/M2 | SYSTOLIC BLOOD PRESSURE: 148 MMHG | WEIGHT: 152.2 LBS | HEART RATE: 75 BPM | DIASTOLIC BLOOD PRESSURE: 52 MMHG | HEIGHT: 64 IN | TEMPERATURE: 97.6 F | RESPIRATION RATE: 16 BRPM

## 2019-05-13 DIAGNOSIS — E11.9 TYPE 2 DIABETES MELLITUS WITHOUT COMPLICATION, WITHOUT LONG-TERM CURRENT USE OF INSULIN (HCC): ICD-10-CM

## 2019-05-13 DIAGNOSIS — I10 ESSENTIAL HYPERTENSION: ICD-10-CM

## 2019-05-13 DIAGNOSIS — E78.00 PURE HYPERCHOLESTEROLEMIA: ICD-10-CM

## 2019-05-13 DIAGNOSIS — L30.9 DERMATITIS: Primary | ICD-10-CM

## 2019-05-13 DIAGNOSIS — L25.9 CONTACT DERMATITIS, UNSPECIFIED CONTACT DERMATITIS TYPE, UNSPECIFIED TRIGGER: ICD-10-CM

## 2019-05-13 PROCEDURE — 36415 COLL VENOUS BLD VENIPUNCTURE: CPT

## 2019-05-13 PROCEDURE — 83036 HEMOGLOBIN GLYCOSYLATED A1C: CPT

## 2019-05-13 PROCEDURE — 82043 UR ALBUMIN QUANTITATIVE: CPT

## 2019-05-13 PROCEDURE — 80053 COMPREHEN METABOLIC PANEL: CPT

## 2019-05-13 RX ORDER — SIMVASTATIN 10 MG/1
TABLET, FILM COATED ORAL
Refills: 3 | COMMUNITY
Start: 2019-03-06 | End: 2019-09-16 | Stop reason: ALTCHOICE

## 2019-05-13 RX ORDER — HYDROXYZINE HYDROCHLORIDE 10 MG/1
TABLET, FILM COATED ORAL
Refills: 1 | COMMUNITY
Start: 2019-05-02

## 2019-05-13 RX ORDER — PREDNISONE 5 MG/1
5 TABLET ORAL 2 TIMES DAILY
Qty: 14 TAB | Refills: 0 | Status: SHIPPED | OUTPATIENT
Start: 2019-05-13 | End: 2019-09-16 | Stop reason: ALTCHOICE

## 2019-05-13 RX ORDER — TRIAMCINOLONE ACETONIDE 1 MG/G
CREAM TOPICAL
Qty: 15 G | Refills: 0 | Status: SHIPPED | OUTPATIENT
Start: 2019-05-13 | End: 2019-10-26 | Stop reason: SDUPTHER

## 2019-05-13 NOTE — PROGRESS NOTES
Identified pt with two pt identifiers(name and ). Reviewed record in preparation for visit and have obtained necessary documentation. All patient medications has been reviewed. Chief Complaint   Patient presents with    Hypertension     4 month f/u    Neurologic Problem    Diabetes     Health Maintenance Due   Topic    Shingrix Vaccine Age 50> (1 of 2)    GLAUCOMA SCREENING Q2Y     EYE EXAM RETINAL OR DILATED     FOOT EXAM Q1     MICROALBUMIN Q1     MEDICARE YEARLY EXAM      ADL Assessment 2019   Feeding yourself No Help Needed   Getting from bed to chair Help Needed   Getting dressed Help Needed   Bathing or showering Help Needed   Walk across the room (includes cane/walker) Help Needed   Using the telphone Help Needed   Taking your medications Help Needed   Preparing meals No Help Needed   Managing money (expenses/bills) Help Needed   Moderately strenuous housework (laundry) Help Needed   Shopping for personal items (toiletries/medicines) Help Needed   Shopping for groceries Help Needed   Driving Help Needed   Climbing a flight of stairs Help Needed   Getting to places beyond walking distances Help Needed     3 most recent PHQ Screens 2019   Little interest or pleasure in doing things Not at all   Feeling down, depressed, irritable, or hopeless Not at all   Total Score PHQ 2 0     Fall Risk Assessment, last 12 mths 2019   Able to walk? Yes   Fall in past 12 months? No   Fall with injury? -   Number of falls in past 12 months -   Fall Risk Score -     Abuse Screening Questionnaire 2019   Do you ever feel afraid of your partner? N   Are you in a relationship with someone who physically or mentally threatens you? N   Is it safe for you to go home?  Y     Vitals:    19 0950 19 1012   BP: 150/52 148/52   Pulse: 75    Resp: 16    Temp: 97.6 °F (36.4 °C)    TempSrc: Oral    SpO2: 98%    Weight: 152 lb 3.2 oz (69 kg)    Height: 5' 4\" (1.626 m)    PainSc:   6    PainLoc: Leg Coordination of Care Questionnaire:   1. Have you been to the ER, urgent care clinic since your last visit? Hospitalized since your last visit? No    2. Have you seen or consulted any other health care providers outside of the [address] [place] [address] since your last visit? Include any pap smears or colon screening. No    3) Do you have an Advanced Directive/ Living Will in place? NO  If yes, do we have a copy on file NO  If no, would you like information NO    Patient is accompanied by son I have received verbal consent from [address] [place] [address] to discuss any/all medical information while they are present in the room.

## 2019-05-13 NOTE — PROGRESS NOTES
HISTORY OF PRESENT ILLNESS  Christiano Calhoun is a [de-identified] y.o. male here for follow up . He is accompanied by his son. He has been suffering from dry rash in multiple areas on the body including lower extremities and upper extremities. He has seen dermatologist.  New topical agents was ordered, did not cover through insurance. He has been using betamethasone and triamcinolone, not helping him. He is also on hydroxyzine for itching. Making him too sleepy. He is seen for diabetes. Jocelyn Dai He reports medication compliance: compliant all of the time. Diabetic diet compliance: compliant most of the time. Home glucose monitoring: is performed sporadically, values are usually normal. Further diabetic ROS: no polyuria or polydipsia, no chest pain, dyspnea or TIA's, no numbness, tingling or pain in extremities, no hypoglycemia. Lab review: labs are reviewed, up to date and normal, labs reviewed and discussed with patient. Eye exam: Not up to date. 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 Using diclofenac 3 times a day. Has HTN,BP is stable. Need lab work. Hypertension    Pertinent negatives include no chest pain, no palpitations and no blurred vision. Cholesterol Problem   Pertinent negatives include no chest pain. Diabetes   Pertinent negatives include no chest pain. Rash    Associated symptoms include itching. Follow-up   Pertinent negatives include no chest pain. Review of Systems   Constitutional: Negative. Negative for chills and fever. HENT: Negative. Eyes: Negative. Negative for blurred vision and double vision. Respiratory: Negative. Cardiovascular: Negative. Negative for chest pain and palpitations. Gastrointestinal: Negative. Genitourinary: Negative. Musculoskeletal: Positive for joint pain. Skin: Positive for itching and rash. Neurological: Positive for tingling. Psychiatric/Behavioral: Positive for memory loss.        Physical Exam   Constitutional: He appears well-developed and well-nourished. No distress. Neck: Normal range of motion. Neck supple. No JVD present. No thyromegaly present. Cardiovascular: Normal rate, regular rhythm, normal heart sounds and intact distal pulses. Pulmonary/Chest: Effort normal and breath sounds normal. No respiratory distress. He has no wheezes. Musculoskeletal: He exhibits no edema or tenderness. Both feet: Dorsalis pedis pulse normal.  Nontender foot. Skin: Rash noted. Right lower extremities: Few areas of macular patchy dry skin with sandpaper lesion present. Also few areas in the upper forearm too. itychy   Psychiatric: He has a normal mood and affect. His behavior is normal. Judgment and thought content normal.       ASSESSMENT and PLAN    Diagnoses and all orders for this visit:    1. Dermatitis    Already on multiple topical agents, not helping him. Will give,  -     predniSONE (DELTASONE) 5 mg tablet; Take 1 Tab by mouth two (2) times a day. Need to go back and see the dermatologist.  2. Type 2 diabetes mellitus without complication, without long-term current use of insulin ([address]    On Janumet Tradjenta and glipizide. Will check,  -     METABOLIC PANEL, COMPREHENSIVE  -     MICROALBUMIN, UR, RAND W/ MICROALB/CREAT RATIO  -     HEMOGLOBIN A1C WITH EAG  -     REFERRAL TO OPTOMETRY    3. Pure hypercholesterolemia    On pravastatin. Will check,  -     METABOLIC PANEL, COMPREHENSIVE    4. Essential hypertension    Stable blood pressure, on lisinopril. Will check,  -     METABOLIC PANEL, COMPREHENSIVE          Issues reviewed with her: referral to Diabetic Education department, diabetic diet discussed in detail, written exchange diet given, home glucose monitoring emphasized, all medications, side effects and compliance discussed carefully, foot care discussed and Podiatry visits discussed, annual eye examinations at Ophthalmology discussed, long term diabetic complications discussed and labs immediately prior to next visit.

## 2019-05-14 LAB
ALBUMIN SERPL-MCNC: 4.3 G/DL (ref 3.5–4.7)
ALBUMIN/CREAT UR: <11.9 MG/G CREAT (ref 0–30)
ALBUMIN/GLOB SERPL: 1.7 {RATIO} (ref 1.2–2.2)
ALP SERPL-CCNC: 57 IU/L (ref 39–117)
ALT SERPL-CCNC: 15 IU/L (ref 0–44)
AST SERPL-CCNC: 19 IU/L (ref 0–40)
BILIRUB SERPL-MCNC: <0.2 MG/DL (ref 0–1.2)
BUN SERPL-MCNC: 16 MG/DL (ref 8–27)
BUN/CREAT SERPL: 17 (ref 10–24)
CALCIUM SERPL-MCNC: 9.5 MG/DL (ref 8.6–10.2)
CHLORIDE SERPL-SCNC: 104 MMOL/L (ref 96–106)
CO2 SERPL-SCNC: 25 MMOL/L (ref 20–29)
CREAT SERPL-MCNC: 0.96 MG/DL (ref 0.76–1.27)
CREAT UR-MCNC: 25.3 MG/DL
EST. AVERAGE GLUCOSE BLD GHB EST-MCNC: 166 MG/DL
GLOBULIN SER CALC-MCNC: 2.6 G/DL (ref 1.5–4.5)
GLUCOSE SERPL-MCNC: 96 MG/DL (ref 65–99)
HBA1C MFR BLD: 7.4 % (ref 4.8–5.6)
MICROALBUMIN UR-MCNC: <3 UG/ML
POTASSIUM SERPL-SCNC: 4.5 MMOL/L (ref 3.5–5.2)
PROT SERPL-MCNC: 6.9 G/DL (ref 6–8.5)
SODIUM SERPL-SCNC: 145 MMOL/L (ref 134–144)

## 2019-05-24 DIAGNOSIS — E11.9 TYPE 2 DIABETES MELLITUS WITHOUT COMPLICATION, WITHOUT LONG-TERM CURRENT USE OF INSULIN (HCC): ICD-10-CM

## 2019-05-24 RX ORDER — LISINOPRIL 10 MG/1
TABLET ORAL
Qty: 90 TAB | Refills: 2 | Status: SHIPPED | OUTPATIENT
Start: 2019-05-24 | End: 2020-02-17

## 2019-05-24 RX ORDER — LINAGLIPTIN 5 MG/1
TABLET, FILM COATED ORAL
Qty: 90 TAB | Refills: 1 | Status: SHIPPED | OUTPATIENT
Start: 2019-05-24 | End: 2019-11-17 | Stop reason: SDUPTHER

## 2019-05-25 DIAGNOSIS — M15.9 PRIMARY OSTEOARTHRITIS INVOLVING MULTIPLE JOINTS: ICD-10-CM

## 2019-05-28 RX ORDER — DICLOFENAC SODIUM 10 MG/G
GEL TOPICAL
Qty: 100 G | Refills: 3 | Status: SHIPPED | OUTPATIENT
Start: 2019-05-28 | End: 2019-07-15 | Stop reason: ALTCHOICE

## 2019-06-25 DIAGNOSIS — M15.9 PRIMARY OSTEOARTHRITIS INVOLVING MULTIPLE JOINTS: ICD-10-CM

## 2019-06-25 RX ORDER — DICLOFENAC SODIUM 10 MG/G
GEL TOPICAL
Qty: 300 G | Refills: 3 | Status: SHIPPED | OUTPATIENT
Start: 2019-06-25 | End: 2020-01-23

## 2019-07-06 DIAGNOSIS — E11.9 TYPE 2 DIABETES MELLITUS WITHOUT COMPLICATION, WITHOUT LONG-TERM CURRENT USE OF INSULIN (HCC): ICD-10-CM

## 2019-07-08 RX ORDER — SITAGLIPTIN AND METFORMIN HYDROCHLORIDE 50; 1000 MG/1; MG/1
TABLET, FILM COATED ORAL
Qty: 180 TAB | Refills: 1 | Status: SHIPPED | OUTPATIENT
Start: 2019-07-08 | End: 2020-02-10

## 2019-07-10 DIAGNOSIS — M15.9 PRIMARY OSTEOARTHRITIS INVOLVING MULTIPLE JOINTS: ICD-10-CM

## 2019-07-10 DIAGNOSIS — L30.9 DERMATITIS: ICD-10-CM

## 2019-07-10 RX ORDER — PREDNISONE 5 MG/1
5 TABLET ORAL 2 TIMES DAILY
Qty: 14 TAB | Refills: 0 | OUTPATIENT
Start: 2019-07-10

## 2019-07-15 RX ORDER — DICLOFENAC SODIUM 10 MG/G
GEL TOPICAL
Qty: 300 G | Refills: 1 | Status: SHIPPED | OUTPATIENT
Start: 2019-07-15 | End: 2019-09-16 | Stop reason: SDUPTHER

## 2019-07-16 RX ORDER — TAMSULOSIN HYDROCHLORIDE 0.4 MG/1
CAPSULE ORAL
Qty: 90 CAP | Refills: 1 | Status: SHIPPED | OUTPATIENT
Start: 2019-07-16 | End: 2020-03-04

## 2019-07-18 RX ORDER — FLUTICASONE PROPIONATE 50 MCG
SPRAY, SUSPENSION (ML) NASAL
Qty: 1 BOTTLE | Refills: 0 | Status: SHIPPED | OUTPATIENT
Start: 2019-07-18 | End: 2019-08-19 | Stop reason: SDUPTHER

## 2019-08-05 DIAGNOSIS — F02.80 ALZHEIMER'S DEMENTIA WITHOUT BEHAVIORAL DISTURBANCE, UNSPECIFIED TIMING OF DEMENTIA ONSET: ICD-10-CM

## 2019-08-05 DIAGNOSIS — G30.9 ALZHEIMER'S DEMENTIA WITHOUT BEHAVIORAL DISTURBANCE, UNSPECIFIED TIMING OF DEMENTIA ONSET: ICD-10-CM

## 2019-08-05 RX ORDER — DONEPEZIL HYDROCHLORIDE 5 MG/1
5 TABLET, FILM COATED ORAL
Qty: 90 TAB | Refills: 2 | Status: SHIPPED | OUTPATIENT
Start: 2019-08-05 | End: 2020-05-27

## 2019-08-14 RX ORDER — PRAVASTATIN SODIUM 20 MG/1
20 TABLET ORAL
Qty: 90 TAB | Refills: 1 | Status: SHIPPED | OUTPATIENT
Start: 2019-08-14 | End: 2020-02-03

## 2019-09-16 ENCOUNTER — HOSPITAL ENCOUNTER (OUTPATIENT)
Dept: LAB | Age: 79
Discharge: HOME | End: 2019-09-16
Payer: MEDICARE

## 2019-09-16 ENCOUNTER — OFFICE VISIT (OUTPATIENT)
Dept: INTERNAL MEDICINE CLINIC | Age: 79
End: 2019-09-16

## 2019-09-16 VITALS
TEMPERATURE: 98.2 F | HEART RATE: 82 BPM | RESPIRATION RATE: 14 BRPM | SYSTOLIC BLOOD PRESSURE: 140 MMHG | HEIGHT: 64 IN | OXYGEN SATURATION: 98 % | WEIGHT: 146.8 LBS | BODY MASS INDEX: 25.06 KG/M2 | DIASTOLIC BLOOD PRESSURE: 74 MMHG

## 2019-09-16 DIAGNOSIS — E11.40 TYPE 2 DIABETES MELLITUS WITH DIABETIC NEUROPATHY, WITHOUT LONG-TERM CURRENT USE OF INSULIN (HCC): Primary | ICD-10-CM

## 2019-09-16 DIAGNOSIS — Z71.89 ACP (ADVANCE CARE PLANNING): ICD-10-CM

## 2019-09-16 DIAGNOSIS — E11.49 OTHER DIABETIC NEUROLOGICAL COMPLICATION ASSOCIATED WITH TYPE 2 DIABETES MELLITUS (HCC): ICD-10-CM

## 2019-09-16 DIAGNOSIS — Z23 ENCOUNTER FOR IMMUNIZATION: ICD-10-CM

## 2019-09-16 DIAGNOSIS — R11.0 NAUSEA: ICD-10-CM

## 2019-09-16 DIAGNOSIS — I10 ESSENTIAL HYPERTENSION: ICD-10-CM

## 2019-09-16 DIAGNOSIS — E55.9 VITAMIN D DEFICIENCY: ICD-10-CM

## 2019-09-16 DIAGNOSIS — Z00.00 MEDICARE ANNUAL WELLNESS VISIT, SUBSEQUENT: ICD-10-CM

## 2019-09-16 PROCEDURE — 82306 VITAMIN D 25 HYDROXY: CPT

## 2019-09-16 PROCEDURE — 36415 COLL VENOUS BLD VENIPUNCTURE: CPT

## 2019-09-16 PROCEDURE — 83036 HEMOGLOBIN GLYCOSYLATED A1C: CPT

## 2019-09-16 PROCEDURE — 80053 COMPREHEN METABOLIC PANEL: CPT

## 2019-09-16 RX ORDER — CLOBETASOL PROPIONATE 0.5 MG/G
OINTMENT TOPICAL
Refills: 1 | COMMUNITY
Start: 2019-06-12 | End: 2020-01-13 | Stop reason: ALTCHOICE

## 2019-09-16 RX ORDER — RANITIDINE 150 MG/1
150 TABLET, FILM COATED ORAL
Qty: 30 TAB | Refills: 4 | Status: SHIPPED | OUTPATIENT
Start: 2019-09-16 | End: 2020-01-13 | Stop reason: ALTCHOICE

## 2019-09-16 RX ORDER — GABAPENTIN 100 MG/1
CAPSULE ORAL
Qty: 60 CAP | Refills: 5 | Status: SHIPPED | OUTPATIENT
Start: 2019-09-16 | End: 2021-03-03 | Stop reason: SDUPTHER

## 2019-09-16 RX ORDER — ONDANSETRON 4 MG/1
4 TABLET, ORALLY DISINTEGRATING ORAL
Qty: 30 TAB | Refills: 0 | Status: SHIPPED | OUTPATIENT
Start: 2019-09-16

## 2019-09-16 NOTE — PATIENT INSTRUCTIONS
Medicare Wellness Visit, Male    The best way to live healthy is to have a lifestyle where you eat a well-balanced diet, exercise regularly, limit alcohol use, and quit all forms of tobacco/nicotine, if applicable. Regular preventive services are another way to keep healthy. Preventive services (vaccines, screening tests, monitoring & exams) can help personalize your care plan, which helps you manage your own care. Screening tests can find health problems at the earliest stages, when they are easiest to treat. [facility] follows the current, evidence-based guidelines published by the [facility] Neymar Kim ([facility]) when recommending preventive services for our patients. Because we follow these guidelines, sometimes recommendations change over time as research supports it. (For example, a prostate screening blood test is no longer routinely recommended for men with no symptoms.)  Of course, you and your doctor may decide to screen more often for some diseases, based on your risk and co-morbidities (chronic disease you are already diagnosed with). Preventive services for you include:  - Medicare offers their members a free annual wellness visit, which is time for you and your primary care provider to discuss and plan for your preventive service needs. Take advantage of this benefit every year!  -All adults over age 72 should receive the recommended pneumonia vaccines. Current USPSTF guidelines recommend a series of two vaccines for the best pneumonia protection.   -All adults should have a flu vaccine yearly and an ECG.  All adults age 61 and older should receive a shingles vaccine once in their lifetime.    -All adults age 38-68 who are overweight should have a diabetes screening test once every three years.   -Other screening tests & preventive services for persons with diabetes include: an eye exam to screen for diabetic retinopathy, a kidney function test, a foot exam, and stricter control over your cholesterol.   -Cardiovascular screening for adults with routine risk involves an electrocardiogram (ECG) at intervals determined by the provider.   -Colorectal cancer screening should be done for adults age 54-65 with no increased risk factors for colorectal cancer. There are a number of acceptable methods of screening for this type of cancer. Each test has its own benefits and drawbacks. Discuss with your provider what is most appropriate for you during your annual wellness visit. The different tests include: colonoscopy (considered the best screening method), a fecal occult blood test, a fecal DNA test, and sigmoidoscopy.  -All adults born between [facility] should be screened once for Hepatitis C.  -An Abdominal Aortic Aneurysm (AAA) Screening is recommended for men age 73-68 who has ever smoked in their lifetime. Here is a list of your current Health Maintenance items (your personalized list of preventive services) with a due date:  Health Maintenance Due   Topic Date Due    Shingles Vaccine (1 of 2) 01/07/1989    Glaucoma Screening   02/14/2017    Eye Exam  02/14/2017    Diabetic Foot Care  06/04/2019    Flu Vaccine  08/01/2019    Cholesterol Test   10/01/2019     Vaccine Information Statement    Influenza (Flu) Vaccine (Inactivated or Recombinant): What You Need to Know    Many Vaccine Information Statements are available in [place]ese and other languages. See www.immunize.org/vis  Hojas de información sobre vacunas están disponibles en español y en muchos otros idiomas. Visite www.immunize.org/vis    1. Why get vaccinated? Influenza vaccine can prevent influenza (flu). Flu is a contagious disease that spreads around the [place] every year, usually between October and May. Anyone can get the flu, but it is more dangerous for some people.  Infants and young children, people 72years of age and older, pregnant women, and people with certain health conditions or a weakened immune system are at greatest risk of flu complications. Pneumonia, bronchitis, sinus infections and ear infections are examples of flu-related complications. If you have a medical condition, such as heart disease, cancer or diabetes, flu can make it worse. Flu can cause fever and chills, sore throat, muscle aches, fatigue, cough, headache, and runny or stuffy nose. Some people may have vomiting and diarrhea, though this is more common in children than adults. Each year thousands of people in the [facility] die from flu, and many more are hospitalized. Flu vaccine prevents millions of illnesses and flu-related visits to the doctor each year. 2. Influenza vaccines     CDC recommends everyone 10months of age and older get vaccinated every flu season. Children 6 months through 6years of age may need 2 doses during a single flu season. Everyone else needs only 1 dose each flu season. It takes about 2 weeks for protection to develop after vaccination. There are many flu viruses, and they are always changing. Each year a new flu vaccine is made to protect against three or four viruses that are likely to cause disease in the upcoming flu season. Even when the vaccine doesnt exactly match these viruses, it may still provide some protection. Influenza vaccine does not cause flu. Influenza vaccine may be given at the same time as other vaccines. 3. Talk with your health care provider    Tell your vaccine provider if the person getting the vaccine:   Has had an allergic reaction after a previous dose of influenza vaccine, or has any severe, life-threatening allergies.  Has ever had Guillain-Barré Syndrome (also called GBS). In some cases, your health care provider may decide to postpone influenza vaccination to a future visit. People with minor illnesses, such as a cold, may be vaccinated.  People who are moderately or severely ill should usually wait until they recover before getting influenza vaccine. Your health care provider can give you more information. 4. Risks of a reaction     Soreness, redness, and swelling where shot is given, fever, muscle aches, and headache can happen after influenza vaccine.  There may be a very small increased risk of Guillain-Barré Syndrome (GBS) after inactivated influenza vaccine (the flu shot). Kiki Hurst children who get the flu shot along with pneumococcal vaccine (PCV13), and/or DTaP vaccine at the same time might be slightly more likely to have a seizure caused by fever. Tell your health care provider if a child who is getting flu vaccine has ever had a seizure. People sometimes faint after medical procedures, including vaccination. Tell your provider if you feel dizzy or have vision changes or ringing in the ears. As with any medicine, there is a very remote chance of a vaccine causing a severe allergic reaction, other serious injury, or death. 5. What if there is a serious problem? An allergic reaction could occur after the vaccinated person leaves the clinic. If you see signs of a severe allergic reaction (hives, swelling of the face and throat, difficulty breathing, a fast heartbeat, dizziness, or weakness), call 9-1-1 and get the person to the nearest hospital.    For other signs that concern you, call your health care provider. Adverse reactions should be reported to the Vaccine Adverse Event Reporting System (VAERS). Your health care provider will usually file this report, or you can do it yourself. Visit the VAERS website at www.vaers. hhs.gov or call 3-853.888.1111. VAERS is only for reporting reactions, and VAERS staff do not give medical advice. 6. The National Vaccine Injury Compensation Program    The [facility] Vaccine Injury Compensation Program (VICP) is a federal program that was created to compensate people who may have been injured by certain vaccines.  Visit the VICP website at www.hrsa.gov/vaccinecompensation or call 9-141.116.6886 to learn about the program and about filing a claim. There is a time limit to file a claim for compensation. 7. How can I learn more?  Ask your health care provider.  Call your local or state health department.  Contact the Centers for Disease Control and Prevention (CDC):  - Call 6-793.632.6683 (1-800-CDC-INFO) or  - Visit CDCs influenza website at www.cdc.gov/flu    Vaccine Information Statement (Interim)  Inactivated Influenza Vaccine   8/15/2019  [address] [id number]   Department of Health and Human Services  Centers for Disease Control and Prevention    Office Use Only

## 2019-09-16 NOTE — PROGRESS NOTES
This is the Subsequent Medicare Annual Wellness Exam, performed 12 months or more after the Initial AWV or the last Subsequent AWV    I have reviewed the patient's medical history in detail and updated the computerized patient record. History     Past Medical History:   Diagnosis Date    Chronic kidney disease     DJD (degenerative joint disease)     DM (diabetes mellitus) ([address]     type ll    HTN (hypertension)     Other and unspecified hyperlipidemia       No past surgical history on file. Current Outpatient Medications   Medication Sig Dispense Refill    clobetasol (TEMOVATE) 0.05 % ointment APPLY TO BODY TWICE A DAY AS NEEDED  1    cholecalciferol (VITAMIN D3) 1,000 unit cap TAKE 1 CAP BY MOUTH DAILY. 90 Cap 2    betamethasone valerate (VALISONE) 0.1 % ointment Apply  to affected area daily. D/C triamcelone 30 g 2    aspirin 81 mg chewable tablet Take 1 Tab by mouth every other day. 90 Tab 3    acetaminophen (TYLENOL) 500 mg tablet Take 1 Tab by mouth two (2) times daily as needed. 60 Tab 12    fluticasone propionate (FLONASE) 50 mcg/actuation nasal spray USE 1 SPRAY BY BOTH NOSTRILS ROUTE DAILY. 1 Bottle 0    pravastatin (PRAVACHOL) 20 mg tablet TAKE 1 TAB BY MOUTH NIGHTLY. D/C SIMVASTATIN 90 Tab 1    donepezil (ARICEPT) 5 mg tablet Take 1 Tab by mouth nightly. 90 Tab 2    tamsulosin (FLOMAX) 0.4 mg capsule TAKE 1 CAPSULE BY MOUTH EVERY DAY 90 Cap 1    JANUMET 50-1,000 mg per tablet TAKE 1 TAB BY MOUTH TWO (2) TIMES DAILY (WITH MEALS). D/C JANUMET  Tab 1    diclofenac (VOLTAREN) 1 % gel APPLY 2 GRAMS TO AFFECTED AREA TWO (2) TIMES A DAY.  300 g 3    lisinopril (PRINIVIL, ZESTRIL) 10 mg tablet TAKE 1 TABLET BY MOUTH EVERY DAY 90 Tab 2    TRADJENTA 5 mg tablet TAKE 1 TABLET BY MOUTH EVERY DAY 90 Tab 1    hydrOXYzine HCl (ATARAX) 10 mg tablet TAKE 1-2 TABLETS BY MOUTH AT BEDTIME AS NEEDED ITCHING  1    simvastatin (ZOCOR) 10 mg tablet TAKE 1 TABLET BY MOUTH EVERY DAY AT NIGHT  3    predniSONE (DELTASONE) 5 mg tablet Take 1 Tab by mouth two (2) times a day. 14 Tab 0    triamcinolone acetonide (KENALOG) 0.1 % topical cream APPLY THIN LAYER TO AFFECTED AREA TWICE DAILY 15 g 0    SENNA PLUS 8.6-50 mg per tablet TAKE 1 TABLET BY MOUTH EVERY DAY 90 Tab 1    gabapentin (NEURONTIN) 100 mg capsule TAKE 1 CAPSULE BY MOUTH TWICE A  Cap 1    Walker (ULTRA-LIGHT ROLLATOR) misc Use it everyday 1 Each 0    glipiZIDE SR (GLUCOTROL XL) 5 mg CR tablet TAKE 1 TABLET BY MOUTH EVERY DAY 90 Tab 1    meloxicam (MOBIC) 15 mg tablet Take 1 Tab by mouth daily. 30 Tab 5    cyanocobalamin (VITAMIN B12) 500 mcg tablet Take 1 Tab by mouth daily. 90 Tab 0    loratadine (CLARITIN) 10 mg tablet Take 1 Tab by mouth daily as needed for Allergies. 30 Tab 0    triamcinolone (NASACORT AQ) 55 mcg nasal inhaler 1 [place] by Both Nostrils route nightly. 1 Bottle 3    IRON 325 mg (65 mg iron) tablet TAKE 1 TABLET BY MOUTH DAILY (BEFORE BREAKFAST). 30 tablet 1    OTHER 500 mg. [facility] pain relief      peg 400-propylene glycol (SYSTANE) 0.4-0.3 % Drop Administer 1 Drop to left eye as needed.        Allergies   Allergen Reactions    Amoxicillin Nausea and Vomiting    Hydrocod-Cpm-Pe-Acetaminophen Other (comments)     GI Upset    Seafood [Shellfish Containing Products] Rash     Family History   Problem Relation Age of Onset    Hypertension Mother          at 78 yo   Tarri Jonathan Cancer Mother     Hypertension Father          at 80    Diabetes Sister    Tarri Jonathan Cancer Son         colon cancer    Diabetes Brother         15 siblings, most with DM    Diabetes Son     No Known Problems Son     No Known Problems Son     No Known Problems Son     No Known Problems Son     No Known Problems Son     No Known Problems Daughter      Social History     Tobacco Use    Smoking status: Former Smoker     Packs/day: 2.00     Years: 20.00     Pack years: 40.00     Types: Cigarettes     Last attempt to quit: 1982     Years since quittin.7    Smokeless tobacco: Never Used   Substance Use Topics    Alcohol use: No     Patient Active Problem List   Diagnosis Code    Hypertension I10    Hyperlipidemia E78.5    DJD (degenerative joint disease) M19.90    Vitamin D deficiency E55.9    Type II or unspecified type diabetes mellitus without mention of complication, not stated as uncontrolled E11.9    Type 2 diabetes mellitus without complication ([facility] E93.3    Type 2 diabetes mellitus with diabetic neuropathy ([facility] E11.40       Depression Risk Factor Screening:     3 most recent PHQ Screens 2019   Little interest or pleasure in doing things Not at all   Feeling down, depressed, irritable, or hopeless Not at all   Total Score PHQ 2 0     Alcohol Risk Factor Screening: You do not drink alcohol or very rarely. Functional Ability and Level of Safety:   Hearing Loss  The patient needs further evaluation. Activities of Daily Living  The home contains: cane  Patient does total self care    Fall Risk  Fall Risk Assessment, last 12 mths 2019   Able to walk? Yes   Fall in past 12 months?  No   Fall with injury? -   Number of falls in past 12 months -   Fall Risk Score -       Abuse Screen  Patient is not abused    Cognitive Screening   Evaluation of Cognitive Function:  Has your family/caregiver stated any concerns about your memory: yes  Normal, Abnormal    Patient Care Team   Patient Care Team:  Isac Gonzalez MD as PCP - General (Internal Medicine)  [facility]    Assessment/Plan   Education and counseling provided:  Are appropriate based on today's review and evaluation  End-of-Life planning (with patient's consent)  Screening for glaucoma        Health Maintenance Due   Topic Date Due    Shingrix Vaccine Age 49> (1 of 2) 1989    GLAUCOMA SCREENING Q2Y  2017    EYE EXAM RETINAL OR DILATED  2017    FOOT EXAM Q1  2019    Influenza Age 9 to Adult  2019    LIPID PANEL Q1  10/01/2019

## 2019-09-16 NOTE — PROGRESS NOTES
HISTORY OF PRESENT ILLNESS  Paty Valentin is a [de-identified] y.o. male here for follow up . He is accompanied by his son. Report tingling sensation in the both legs. Not taking gabapentin. Report nausea and heartburn. I found out that he has been taking meloxicam 50 [place] twice a day. Advised him to stop it. Has lower back pain from DJD. Advised him to take Tylenol twice a day. He is seen for diabetes. Tommy Patrick He reports medication compliance: compliant all of the time. Diabetic diet compliance: compliant most of the time. Home glucose monitoring: is performed sporadically, values are usually normal. Further diabetic ROS: no polyuria or polydipsia, no chest pain, dyspnea or TIA's, no numbness, tingling or pain in extremities, no hypoglycemia. Lab review: labs are reviewed, up to date and normal, labs reviewed and discussed with patient. Eye exam: Not up to date. Has HTN,BP is stable. Need lab work. Here for Medicare wellness visit. Has no living will. Hypertension    Pertinent negatives include no chest pain, no palpitations and no blurred vision. Diabetes   Pertinent negatives include no chest pain. Cholesterol Problem   Pertinent negatives include no chest pain. Rash      Follow-up   Pertinent negatives include no chest pain. Medication Refill   Pertinent negatives include no chest pain. Review of Systems   Constitutional: Negative. Negative for chills and fever. HENT: Negative. Eyes: Negative. Negative for blurred vision and double vision. Respiratory: Negative. Cardiovascular: Negative. Negative for chest pain and palpitations. Gastrointestinal: Negative. Genitourinary: Negative. Musculoskeletal: Positive for joint pain. Skin: Negative. Neurological: Positive for tingling. Psychiatric/Behavioral: Positive for memory loss. Physical Exam   Constitutional: He appears well-developed and well-nourished. No distress. Neck: Normal range of motion. Neck supple.  No JVD present. No thyromegaly present. Cardiovascular: Normal rate, regular rhythm, normal heart sounds and intact distal pulses. Pulmonary/Chest: Effort normal and breath sounds normal. No respiratory distress. He has no wheezes. Musculoskeletal: He exhibits no edema or tenderness. Diabetic foot exam:     Left Foot:   Visual Exam: normal    Pulse DP: 2+ (normal)   Filament test: normal sensation    Vibratory sensation: normal      Right Foot:   Visual Exam: normal    Pulse DP: 2+ (normal)   Filament test: normal sensation    Vibratory sensation: normal     Psychiatric: He has a normal mood and affect. His behavior is normal. Thought content normal.   Mild dementia present. ASSESSMENT and PLAN    Diagnoses and all orders for this visit:    1. Type 2 diabetes mellitus with diabetic neuropathy, without long-term current use of insulin ([facility])    A1c stable. On multiple medications. Will check,  -     METABOLIC PANEL, COMPREHENSIVE  -     HEMOGLOBIN A1C WITH EAG    2. Essential hypertension    Stable on current regimen: Continue same. Will check,  -     METABOLIC PANEL, COMPREHENSIVE    3. Medicare annual wellness visit, subsequent    4. ACP (advance care planning)    5. Encounter for immunization  -     INFLUENZA VACCINE INACTIVATED (IIV), SUBUNIT, ADJUVANTED, IM  -     ADMIN INFLUENZA VIRUS VAC    6. Vitamin D deficiency    Will check,  -     VITAMIN D, 25 HYDROXY    7. Nausea    Is taking meloxicam 50 mg twice a day unknowingly. Advised him to stop doing it. He can take half to 1 tablet a day as needed for back pain. Avoid spicy food. Will give,  -     raNITIdine (ZANTAC) 150 mg tablet; Take 1 Tab by mouth nightly. -     ondansetron (ZOFRAN ODT) 4 mg disintegrating tablet; Take 1 Tab by mouth daily as needed for Nausea.     8. Other diabetic neurological complication associated with type 2 diabetes mellitus ([facility]    Will give,  -     gabapentin (NEURONTIN) 100 mg capsule; TAKE 1 CAPSULE BY MOUTH TWICE A DAY              Issues reviewed with her: referral to Diabetic Education department, diabetic diet discussed in detail, written exchange diet given, home glucose monitoring emphasized, all medications, side effects and compliance discussed carefully, foot care discussed and Podiatry visits discussed, annual eye examinations at Ophthalmology discussed, long term diabetic complications discussed and labs immediately prior to next visit.

## 2019-09-16 NOTE — ACP (ADVANCE CARE PLANNING)
Advance Care Planning (ACP) Provider Conversation Snapshot    Date of ACP Conversation: 09/16/19  Persons included in Conversation:  patient and family  Length of ACP Conversation in minutes:  16 minutes    Authorized Decision Maker (if patient is incapable of making informed decisions):    This person is:   son,Chay Mandel            For Patients with Decision Making Capacity:   Values/Goals: Exploration of values, goals, and preferences if recovery is not expected, even with continued medical treatment in the event of:  Imminent death    Conversation Outcomes / Follow-Up Plan:   Recommended completion of Advance Directive form after review of ACP materials and conversation with prospective healthcare agent

## 2019-09-16 NOTE — PROGRESS NOTES
Health Maintenance Due   Topic Date Due    Shingrix Vaccine Age 49> (1 of 2) [birth year]/1989    GLAUCOMA SCREENING Q2Y  02/14/2017    EYE EXAM RETINAL OR DILATED  02/14/2017    FOOT EXAM Q1  06/04/2019    Influenza Age 5 to Adult  08/01/2019    LIPID PANEL Q1  10/01/2019       Chief Complaint   Patient presents with    Annual Wellness Visit    Hypertension    Diabetes     foot exam due    Cholesterol Problem    Medication Refill     Zofran       1. Have you been to the ER, urgent care clinic since your last visit? Hospitalized since your last visit? No    2. Have you seen or consulted any other health care providers outside of the [address] [place] [address] since your last visit? Include any pap smears or colon screening. No    3) Do you have an Advance Directive on file? no    4) Are you interested in receiving information on Advance Directives? NO      Patient is accompanied by son I have received verbal consent from Sony Adams to discuss any/all medical information while they are present in the room. Sony Adams is a [de-identified] y.o. male  who presents for routine immunization(s) Fluad. Patient denies any symptoms , reactions or allergies that would exclude them from being immunized today. Risks and adverse reactions were discussed and the VIS was given to them. Patient voiced full understanding and signed Adult Immunization Consent form. All questions were addressed. Patient was observed for 10 min post injection. There were no reactions observed.     Jaida Friedman LPN

## 2019-09-17 DIAGNOSIS — E11.40 TYPE 2 DIABETES MELLITUS WITH DIABETIC NEUROPATHY, WITHOUT LONG-TERM CURRENT USE OF INSULIN (HCC): ICD-10-CM

## 2019-09-17 LAB
25(OH)D3+25(OH)D2 SERPL-MCNC: 29.3 NG/ML (ref 30–100)
ALBUMIN SERPL-MCNC: 4.4 G/DL (ref 3.5–4.7)
ALBUMIN/GLOB SERPL: 1.5 {RATIO} (ref 1.2–2.2)
ALP SERPL-CCNC: 67 IU/L (ref 39–117)
ALT SERPL-CCNC: 14 IU/L (ref 0–44)
AST SERPL-CCNC: 21 IU/L (ref 0–40)
BILIRUB SERPL-MCNC: 0.2 MG/DL (ref 0–1.2)
BUN SERPL-MCNC: 17 MG/DL (ref 8–27)
BUN/CREAT SERPL: 19 (ref 10–24)
CALCIUM SERPL-MCNC: 9.6 MG/DL (ref 8.6–10.2)
CHLORIDE SERPL-SCNC: 103 MMOL/L (ref 96–106)
CO2 SERPL-SCNC: 28 MMOL/L (ref 20–29)
CREAT SERPL-MCNC: 0.88 MG/DL (ref 0.76–1.27)
EST. AVERAGE GLUCOSE BLD GHB EST-MCNC: 166 MG/DL
GLOBULIN SER CALC-MCNC: 3 G/DL (ref 1.5–4.5)
GLUCOSE SERPL-MCNC: 117 MG/DL (ref 65–99)
HBA1C MFR BLD: 7.4 % (ref 4.8–5.6)
POTASSIUM SERPL-SCNC: 4.4 MMOL/L (ref 3.5–5.2)
PROT SERPL-MCNC: 7.4 G/DL (ref 6–8.5)
SODIUM SERPL-SCNC: 147 MMOL/L (ref 134–144)

## 2019-09-17 RX ORDER — GLIPIZIDE 5 MG/1
TABLET, FILM COATED, EXTENDED RELEASE ORAL
Qty: 90 TAB | Refills: 1 | Status: SHIPPED | OUTPATIENT
Start: 2019-09-17 | End: 2020-03-02

## 2019-09-18 NOTE — PROGRESS NOTES
Slightly low vitamin D, advised to take vitamin D3 1000 units once a day for 4 months. All of the labs are stable.

## 2019-09-23 RX ORDER — FLUTICASONE PROPIONATE 50 MCG
SPRAY, SUSPENSION (ML) NASAL
Qty: 16 G | Refills: 0 | Status: SHIPPED | OUTPATIENT
Start: 2019-09-23 | End: 2019-10-16 | Stop reason: SDUPTHER

## 2019-10-26 DIAGNOSIS — L25.9 CONTACT DERMATITIS, UNSPECIFIED CONTACT DERMATITIS TYPE, UNSPECIFIED TRIGGER: ICD-10-CM

## 2019-10-28 RX ORDER — TRIAMCINOLONE ACETONIDE 1 MG/G
CREAM TOPICAL
Qty: 15 G | Refills: 0 | Status: SHIPPED | OUTPATIENT
Start: 2019-10-28 | End: 2019-12-18

## 2019-11-13 DIAGNOSIS — M47.22 OSTEOARTHRITIS OF SPINE WITH RADICULOPATHY, CERVICAL REGION: ICD-10-CM

## 2019-11-13 RX ORDER — MELOXICAM 15 MG/1
15 TABLET ORAL DAILY
Qty: 30 TAB | Refills: 5 | Status: SHIPPED | OUTPATIENT
Start: 2019-11-13 | End: 2019-11-19 | Stop reason: SDUPTHER

## 2019-11-17 DIAGNOSIS — E11.9 TYPE 2 DIABETES MELLITUS WITHOUT COMPLICATION, WITHOUT LONG-TERM CURRENT USE OF INSULIN (HCC): ICD-10-CM

## 2019-11-18 RX ORDER — LINAGLIPTIN 5 MG/1
TABLET, FILM COATED ORAL
Qty: 90 TAB | Refills: 1 | Status: SHIPPED | OUTPATIENT
Start: 2019-11-18 | End: 2020-04-27

## 2019-11-19 DIAGNOSIS — M47.22 OSTEOARTHRITIS OF SPINE WITH RADICULOPATHY, CERVICAL REGION: ICD-10-CM

## 2019-11-19 RX ORDER — MELOXICAM 15 MG/1
15 TABLET ORAL DAILY
Qty: 90 TAB | Refills: 1 | Status: SHIPPED | OUTPATIENT
Start: 2019-11-19 | End: 2021-03-03 | Stop reason: SDUPTHER

## 2019-11-25 RX ORDER — GLUCOSAMINE SULFATE 1500 MG
POWDER IN PACKET (EA) ORAL
Qty: 90 CAP | Refills: 2 | Status: SHIPPED | OUTPATIENT
Start: 2019-11-25 | End: 2020-09-08

## 2019-12-18 DIAGNOSIS — L25.9 CONTACT DERMATITIS, UNSPECIFIED CONTACT DERMATITIS TYPE, UNSPECIFIED TRIGGER: ICD-10-CM

## 2019-12-18 RX ORDER — TRIAMCINOLONE ACETONIDE 1 MG/G
CREAM TOPICAL
Qty: 15 G | Refills: 0 | Status: SHIPPED | OUTPATIENT
Start: 2019-12-18 | End: 2020-01-06

## 2019-12-21 DIAGNOSIS — I10 ESSENTIAL HYPERTENSION: ICD-10-CM

## 2019-12-23 RX ORDER — GUAIFENESIN 100 MG/5ML
LIQUID (ML) ORAL
Qty: 90 TAB | Refills: 3 | Status: SHIPPED | OUTPATIENT
Start: 2019-12-23 | End: 2021-02-03

## 2020-01-04 DIAGNOSIS — L30.9 DERMATITIS: ICD-10-CM

## 2020-01-04 DIAGNOSIS — L25.9 CONTACT DERMATITIS, UNSPECIFIED CONTACT DERMATITIS TYPE, UNSPECIFIED TRIGGER: ICD-10-CM

## 2020-01-06 RX ORDER — BETAMETHASONE VALERATE 1.2 MG/G
OINTMENT TOPICAL DAILY
Qty: 45 G | Refills: 2 | Status: SHIPPED | OUTPATIENT
Start: 2020-01-06 | End: 2020-06-01

## 2020-01-06 RX ORDER — TRIAMCINOLONE ACETONIDE 1 MG/G
CREAM TOPICAL
Qty: 15 G | Refills: 0 | Status: SHIPPED | OUTPATIENT
Start: 2020-01-06 | End: 2020-01-13 | Stop reason: ALTCHOICE

## 2020-01-13 ENCOUNTER — HOSPITAL ENCOUNTER (OUTPATIENT)
Dept: LAB | Age: 80
Discharge: HOME | End: 2020-01-13
Payer: MEDICARE

## 2020-01-13 ENCOUNTER — OFFICE VISIT (OUTPATIENT)
Dept: INTERNAL MEDICINE CLINIC | Age: 80
End: 2020-01-13

## 2020-01-13 VITALS
DIASTOLIC BLOOD PRESSURE: 64 MMHG | SYSTOLIC BLOOD PRESSURE: 156 MMHG | RESPIRATION RATE: 17 BRPM | OXYGEN SATURATION: 95 % | WEIGHT: 149.8 LBS | HEART RATE: 92 BPM | TEMPERATURE: 98.5 F | BODY MASS INDEX: 25.57 KG/M2 | HEIGHT: 64 IN

## 2020-01-13 DIAGNOSIS — F02.80 ALZHEIMER'S DEMENTIA WITHOUT BEHAVIORAL DISTURBANCE, UNSPECIFIED TIMING OF DEMENTIA ONSET: ICD-10-CM

## 2020-01-13 DIAGNOSIS — I10 ESSENTIAL HYPERTENSION: Primary | ICD-10-CM

## 2020-01-13 DIAGNOSIS — R30.0 DYSURIA: ICD-10-CM

## 2020-01-13 DIAGNOSIS — M79.671 PAIN IN BOTH FEET: ICD-10-CM

## 2020-01-13 DIAGNOSIS — M79.672 PAIN IN BOTH FEET: ICD-10-CM

## 2020-01-13 DIAGNOSIS — E11.9 TYPE 2 DIABETES MELLITUS WITHOUT COMPLICATION, WITHOUT LONG-TERM CURRENT USE OF INSULIN (HCC): ICD-10-CM

## 2020-01-13 DIAGNOSIS — E78.00 PURE HYPERCHOLESTEROLEMIA: ICD-10-CM

## 2020-01-13 DIAGNOSIS — G30.9 ALZHEIMER'S DEMENTIA WITHOUT BEHAVIORAL DISTURBANCE, UNSPECIFIED TIMING OF DEMENTIA ONSET: ICD-10-CM

## 2020-01-13 DIAGNOSIS — M47.22 OSTEOARTHRITIS OF SPINE WITH RADICULOPATHY, CERVICAL REGION: ICD-10-CM

## 2020-01-13 PROCEDURE — 36415 COLL VENOUS BLD VENIPUNCTURE: CPT

## 2020-01-13 PROCEDURE — 80053 COMPREHEN METABOLIC PANEL: CPT

## 2020-01-13 PROCEDURE — 82043 UR ALBUMIN QUANTITATIVE: CPT

## 2020-01-13 PROCEDURE — 81003 URINALYSIS AUTO W/O SCOPE: CPT

## 2020-01-13 PROCEDURE — 80061 LIPID PANEL: CPT

## 2020-01-13 PROCEDURE — 83036 HEMOGLOBIN GLYCOSYLATED A1C: CPT

## 2020-01-13 RX ORDER — FLUTICASONE PROPIONATE 50 MCG
SPRAY, SUSPENSION (ML) NASAL
Qty: 1 BOTTLE | Refills: 1 | Status: SHIPPED | OUTPATIENT
Start: 2020-01-13 | End: 2020-02-06

## 2020-01-13 NOTE — PROGRESS NOTES
Health Maintenance Due   Topic Date Due    Shingrix Vaccine Age 49> (1 of 2) 01[birth year]/1989    GLAUCOMA SCREENING Q2Y  02/14/2017    EYE EXAM RETINAL OR DILATED  02/14/2017    LIPID PANEL Q1  10/01/2019       Chief Complaint   Patient presents with    Hypertension    Diabetes    Cholesterol Problem       1. Have you been to the ER, urgent care clinic since your last visit? Hospitalized since your last visit? No    2. Have you seen or consulted any other health care providers outside of the [address] [place] [address] since your last visit? Include any pap smears or colon screening. No    3) Do you have an Advance Directive on file? no    4) Are you interested in receiving information on Advance Directives? NO      Patient is accompanied by self I have received verbal consent from [address] [place] [address] to discuss any/all medical information while they are present in the room.

## 2020-01-13 NOTE — PROGRESS NOTES
HISTORY OF PRESENT ILLNESS  Eric Medeiros is a 79 y.o. male here for follow up . He is accompanied by his son. Report some dysuria while urinating. No fever no frequency. He is seen for diabetes. Hali Gomez He reports medication compliance: compliant all of the time. Diabetic diet compliance: compliant most of the time. Home glucose monitoring: is performed sporadically, values are usually normal. Further diabetic ROS: no polyuria or polydipsia, no chest pain, dyspnea or TIA's, no numbness, tingling or pain in extremities, no hypoglycemia. Lab review: labs are reviewed, up to date and normal, labs reviewed and discussed with patient. Eye exam: Not up to date. Has HTN,BP is stable. Need lab work. Has neuropathic pain in both feet, feet hurts. Need to see foot doctor. Hypertension    Pertinent negatives include no chest pain, no palpitations and no blurred vision. Diabetes   Pertinent negatives include no chest pain. Cholesterol Problem   Pertinent negatives include no chest pain. Medication Refill   Pertinent negatives include no chest pain. Follow-up   Pertinent negatives include no chest pain. Review of Systems   Constitutional: Negative. Negative for chills and fever. HENT: Negative. Eyes: Negative. Negative for blurred vision and double vision. Respiratory: Negative. Cardiovascular: Negative. Negative for chest pain and palpitations. Gastrointestinal: Negative. Genitourinary: Negative. Musculoskeletal: Positive for joint pain. Skin: Positive for rash. Neurological: Positive for tingling. Psychiatric/Behavioral: Positive for memory loss. Physical Exam  Constitutional:       General: He is not in acute distress. Appearance: He is well-developed. Neck:      Musculoskeletal: Normal range of motion and neck supple. Thyroid: No thyromegaly. Vascular: No JVD. Cardiovascular:      Rate and Rhythm: Normal rate and regular rhythm.       Heart sounds: Normal heart sounds. Pulmonary:      Effort: Pulmonary effort is normal. No respiratory distress. Breath sounds: Normal breath sounds. No wheezing. Abdominal:      General: Abdomen is flat. Palpations: Abdomen is soft. Comments: KUB nontender. Musculoskeletal:         General: No tenderness. Psychiatric:         Behavior: Behavior normal.         Thought Content: Thought content normal.      Comments: Mild dementia present. ASSESSMENT and PLAN    Diagnoses and all orders for this visit:    1. Essential hypertension    Stable on current regimen, will continue same. Will check,  -     METABOLIC PANEL, COMPREHENSIVE    2. Osteoarthritis of spine with radiculopathy, cervical region  Stable. On Tylenol and NSAID. 3. Type 2 diabetes mellitus without complication, without long-term current use of insulin ([address]    On Janumet and Fredna Mckeon. Will check,  -     METABOLIC PANEL, COMPREHENSIVE  -     MICROALBUMIN, UR, RAND W/ MICROALB/CREAT RATIO  -     HEMOGLOBIN A1C WITH EAG    4. Alzheimer's dementia without behavioral disturbance, unspecified timing of dementia onset ([address]  On Aricept. Doing well. Staying home. 5. Pure hypercholesterolemia    On statin. Will check,  -     LIPID PANEL    6. Pain in both feet     has neuropathic pain. Will refer,      -     REFERRAL TO PODIATRY    7. Dysuria    Need to drink more  fluid. -     URINALYSIS W/ RFLX MICROSCOPIC              Issues reviewed with her: referral to Diabetic Education department, diabetic diet discussed in detail, written exchange diet given, home glucose monitoring emphasized, all medications, side effects and compliance discussed carefully, foot care discussed and Podiatry visits discussed, annual eye examinations at Ophthalmology discussed, long term diabetic complications discussed and labs immediately prior to next visit.

## 2020-01-14 LAB
ALBUMIN SERPL-MCNC: 4.1 G/DL (ref 3.5–4.7)
ALBUMIN/CREAT UR: <6.6 MG/G CREAT (ref 0–30)
ALBUMIN/GLOB SERPL: 1.5 {RATIO} (ref 1.2–2.2)
ALP SERPL-CCNC: 60 IU/L (ref 39–117)
ALT SERPL-CCNC: 18 IU/L (ref 0–44)
APPEARANCE UR: CLEAR
AST SERPL-CCNC: 21 IU/L (ref 0–40)
BILIRUB SERPL-MCNC: 0.2 MG/DL (ref 0–1.2)
BILIRUB UR QL STRIP: NEGATIVE
BUN SERPL-MCNC: 17 MG/DL (ref 8–27)
BUN/CREAT SERPL: 17 (ref 10–24)
CALCIUM SERPL-MCNC: 9.1 MG/DL (ref 8.6–10.2)
CHLORIDE SERPL-SCNC: 104 MMOL/L (ref 96–106)
CHOLEST SERPL-MCNC: 163 MG/DL (ref 100–199)
CO2 SERPL-SCNC: 27 MMOL/L (ref 20–29)
COLOR UR: YELLOW
CREAT SERPL-MCNC: 0.99 MG/DL (ref 0.76–1.27)
CREAT UR-MCNC: 45.2 MG/DL
EST. AVERAGE GLUCOSE BLD GHB EST-MCNC: 166 MG/DL
GLOBULIN SER CALC-MCNC: 2.8 G/DL (ref 1.5–4.5)
GLUCOSE SERPL-MCNC: 102 MG/DL (ref 65–99)
GLUCOSE UR QL: NEGATIVE
HBA1C MFR BLD: 7.4 % (ref 4.8–5.6)
HDLC SERPL-MCNC: 55 MG/DL
HGB UR QL STRIP: NEGATIVE
INTERPRETATION, 910389: NORMAL
KETONES UR QL STRIP: NEGATIVE
LDLC SERPL CALC-MCNC: 94 MG/DL (ref 0–99)
LEUKOCYTE ESTERASE UR QL STRIP: NEGATIVE
Lab: NORMAL
Lab: NORMAL
MICRO URNS: NORMAL
MICROALBUMIN UR-MCNC: <3 UG/ML
NITRITE UR QL STRIP: NEGATIVE
PH UR STRIP: 7.5 [PH] (ref 5–7.5)
POTASSIUM SERPL-SCNC: 4.2 MMOL/L (ref 3.5–5.2)
PROT SERPL-MCNC: 6.9 G/DL (ref 6–8.5)
PROT UR QL STRIP: NEGATIVE
SODIUM SERPL-SCNC: 145 MMOL/L (ref 134–144)
SP GR UR: 1.01 (ref 1–1.03)
TRIGL SERPL-MCNC: 72 MG/DL (ref 0–149)
UROBILINOGEN UR STRIP-MCNC: 0.2 MG/DL (ref 0.2–1)
VLDLC SERPL CALC-MCNC: 14 MG/DL (ref 5–40)

## 2020-01-23 DIAGNOSIS — M15.9 PRIMARY OSTEOARTHRITIS INVOLVING MULTIPLE JOINTS: ICD-10-CM

## 2020-01-23 RX ORDER — DICLOFENAC SODIUM 10 MG/G
GEL TOPICAL
Qty: 300 G | Refills: 1 | Status: SHIPPED | OUTPATIENT
Start: 2020-01-23 | End: 2020-06-15

## 2020-02-03 RX ORDER — PRAVASTATIN SODIUM 20 MG/1
20 TABLET ORAL
Qty: 90 TAB | Refills: 1 | Status: SHIPPED | OUTPATIENT
Start: 2020-02-03 | End: 2020-08-03

## 2020-02-06 RX ORDER — FLUTICASONE PROPIONATE 50 MCG
SPRAY, SUSPENSION (ML) NASAL
Qty: 1 BOTTLE | Refills: 1 | Status: SHIPPED | OUTPATIENT
Start: 2020-02-06 | End: 2020-03-04

## 2020-02-17 RX ORDER — LISINOPRIL 10 MG/1
TABLET ORAL
Qty: 90 TAB | Refills: 2 | Status: SHIPPED | OUTPATIENT
Start: 2020-02-17 | End: 2020-11-04

## 2020-02-23 DIAGNOSIS — R11.0 NAUSEA: ICD-10-CM

## 2020-02-24 RX ORDER — RANITIDINE 150 MG/1
TABLET, FILM COATED ORAL
Qty: 30 TAB | Refills: 4 | Status: SHIPPED | OUTPATIENT
Start: 2020-02-24 | End: 2020-04-20 | Stop reason: ALTCHOICE

## 2020-03-01 DIAGNOSIS — E11.40 TYPE 2 DIABETES MELLITUS WITH DIABETIC NEUROPATHY, WITHOUT LONG-TERM CURRENT USE OF INSULIN (HCC): ICD-10-CM

## 2020-03-02 ENCOUNTER — DOCUMENTATION ONLY (OUTPATIENT)
Dept: INTERNAL MEDICINE CLINIC | Age: 80
End: 2020-03-02

## 2020-03-02 RX ORDER — GLIPIZIDE 5 MG/1
TABLET, FILM COATED, EXTENDED RELEASE ORAL
Qty: 90 TAB | Refills: 1 | Status: SHIPPED | OUTPATIENT
Start: 2020-03-02 | End: 2020-08-07

## 2020-03-04 RX ORDER — FLUTICASONE PROPIONATE 50 MCG
SPRAY, SUSPENSION (ML) NASAL
Qty: 1 BOTTLE | Refills: 1 | Status: SHIPPED | OUTPATIENT
Start: 2020-03-04 | End: 2020-04-27

## 2020-03-04 RX ORDER — TAMSULOSIN HYDROCHLORIDE 0.4 MG/1
CAPSULE ORAL
Qty: 90 CAP | Refills: 1 | Status: SHIPPED | OUTPATIENT
Start: 2020-03-04 | End: 2020-08-07

## 2020-03-16 ENCOUNTER — TELEPHONE (OUTPATIENT)
Dept: INTERNAL MEDICINE CLINIC | Age: 80
End: 2020-03-16

## 2020-03-16 DIAGNOSIS — E11.9 TYPE 2 DIABETES MELLITUS WITHOUT COMPLICATION, WITHOUT LONG-TERM CURRENT USE OF INSULIN (HCC): ICD-10-CM

## 2020-03-16 RX ORDER — SITAGLIPTIN AND METFORMIN HYDROCHLORIDE 50; 1000 MG/1; MG/1
TABLET, FILM COATED ORAL
Qty: 180 TAB | Refills: 0 | Status: SHIPPED | OUTPATIENT
Start: 2020-03-16 | End: 2020-03-18

## 2020-03-16 NOTE — TELEPHONE ENCOUNTER
The prior needs to be to [address] maykel number [id number]  n [id number] [facility]  Id [phone]  [phone]  The pharmacy needs to prior not anthem

## 2020-03-17 DIAGNOSIS — E11.9 TYPE 2 DIABETES MELLITUS WITHOUT COMPLICATION, WITHOUT LONG-TERM CURRENT USE OF INSULIN (HCC): ICD-10-CM

## 2020-03-18 RX ORDER — SITAGLIPTIN AND METFORMIN HYDROCHLORIDE 50; 1000 MG/1; MG/1
TABLET, FILM COATED ORAL
Qty: 180 TAB | Refills: 0 | Status: SHIPPED | OUTPATIENT
Start: 2020-03-18 | End: 2020-07-23 | Stop reason: ALTCHOICE

## 2020-03-23 DIAGNOSIS — E11.40 TYPE 2 DIABETES MELLITUS WITH DIABETIC NEUROPATHY, WITHOUT LONG-TERM CURRENT USE OF INSULIN (HCC): Primary | ICD-10-CM

## 2020-03-24 RX ORDER — LINAGLIPTIN AND METFORMIN HYDROCHLORIDE 2.5; 1 MG/1; MG/1
1 TABLET, FILM COATED ORAL 2 TIMES DAILY WITH MEALS
Qty: 60 TAB | Refills: 5 | Status: SHIPPED | OUTPATIENT
Start: 2020-03-24 | End: 2020-08-07

## 2020-04-20 RX ORDER — FAMOTIDINE 20 MG/1
20 TABLET, FILM COATED ORAL 2 TIMES DAILY
Qty: 60 TAB | Refills: 5 | Status: SHIPPED | OUTPATIENT
Start: 2020-04-20 | End: 2020-10-10

## 2020-04-21 NOTE — TELEPHONE ENCOUNTER
Patient's daughter called requesting a new glucometer,test strips and lancets to be sent to [facility] pharmacy. She did not know what brand. She called medicare and they told her that they cover almost all of them.

## 2020-04-22 DIAGNOSIS — E11.9 TYPE 2 DIABETES MELLITUS WITHOUT COMPLICATION, WITHOUT LONG-TERM CURRENT USE OF INSULIN (HCC): Primary | ICD-10-CM

## 2020-04-22 RX ORDER — LANCETS
EACH MISCELLANEOUS
Qty: 100 EACH | Refills: 11 | Status: SHIPPED | OUTPATIENT
Start: 2020-04-22

## 2020-04-22 RX ORDER — INSULIN PUMP SYRINGE, 3 ML
EACH MISCELLANEOUS
Qty: 1 KIT | Refills: 0 | Status: SHIPPED | OUTPATIENT
Start: 2020-04-22 | End: 2022-01-13 | Stop reason: SDUPTHER

## 2020-04-25 DIAGNOSIS — E11.9 TYPE 2 DIABETES MELLITUS WITHOUT COMPLICATION, WITHOUT LONG-TERM CURRENT USE OF INSULIN (HCC): ICD-10-CM

## 2020-04-27 RX ORDER — LINAGLIPTIN 5 MG/1
TABLET, FILM COATED ORAL
Qty: 90 TAB | Refills: 1 | Status: SHIPPED | OUTPATIENT
Start: 2020-04-27 | End: 2020-10-22

## 2020-04-27 RX ORDER — FLUTICASONE PROPIONATE 50 MCG
SPRAY, SUSPENSION (ML) NASAL
Qty: 1 BOTTLE | Refills: 1 | Status: SHIPPED | OUTPATIENT
Start: 2020-04-27 | End: 2020-05-21

## 2020-04-27 NOTE — TELEPHONE ENCOUNTER
Medicare will only pay for the meter and strips if he tests once a day please redo and send script to pharmacy

## 2020-04-29 RX ORDER — INSULIN PUMP SYRINGE, 3 ML
EACH MISCELLANEOUS
Qty: 1 KIT | Refills: 0 | Status: SHIPPED | OUTPATIENT
Start: 2020-04-29

## 2020-04-29 RX ORDER — LANCETS
EACH MISCELLANEOUS
Qty: 100 EACH | Refills: 1 | Status: SHIPPED | OUTPATIENT
Start: 2020-04-29

## 2020-04-29 NOTE — TELEPHONE ENCOUNTER
Placed new Rx for strips, lancets and meters to test blood sugars. pts insurance will only accept testing once daily. Michael Castellanos

## 2020-05-21 RX ORDER — FLUTICASONE PROPIONATE 50 MCG
SPRAY, SUSPENSION (ML) NASAL
Qty: 1 BOTTLE | Refills: 1 | Status: SHIPPED | OUTPATIENT
Start: 2020-05-21 | End: 2020-06-12

## 2020-05-26 DIAGNOSIS — G30.9 ALZHEIMER'S DEMENTIA WITHOUT BEHAVIORAL DISTURBANCE, UNSPECIFIED TIMING OF DEMENTIA ONSET: ICD-10-CM

## 2020-05-26 DIAGNOSIS — F02.80 ALZHEIMER'S DEMENTIA WITHOUT BEHAVIORAL DISTURBANCE, UNSPECIFIED TIMING OF DEMENTIA ONSET: ICD-10-CM

## 2020-05-27 RX ORDER — DONEPEZIL HYDROCHLORIDE 5 MG/1
TABLET, FILM COATED ORAL
Qty: 90 TAB | Refills: 2 | Status: SHIPPED | OUTPATIENT
Start: 2020-05-27 | End: 2021-02-03

## 2020-05-30 DIAGNOSIS — L30.9 DERMATITIS: ICD-10-CM

## 2020-06-01 RX ORDER — BETAMETHASONE VALERATE 1.2 MG/G
OINTMENT TOPICAL
Qty: 45 G | Refills: 2 | Status: SHIPPED | OUTPATIENT
Start: 2020-06-01 | End: 2020-09-08

## 2020-06-12 RX ORDER — FLUTICASONE PROPIONATE 50 MCG
SPRAY, SUSPENSION (ML) NASAL
Qty: 1 BOTTLE | Refills: 1 | Status: SHIPPED | OUTPATIENT
Start: 2020-06-12 | End: 2020-10-15

## 2020-06-14 DIAGNOSIS — M15.9 PRIMARY OSTEOARTHRITIS INVOLVING MULTIPLE JOINTS: ICD-10-CM

## 2020-06-15 RX ORDER — DICLOFENAC SODIUM 10 MG/G
GEL TOPICAL
Qty: 300 G | Refills: 1 | Status: SHIPPED | OUTPATIENT
Start: 2020-06-15 | End: 2020-07-23 | Stop reason: SDUPTHER

## 2020-07-23 ENCOUNTER — VIRTUAL VISIT (OUTPATIENT)
Dept: INTERNAL MEDICINE CLINIC | Age: 80
End: 2020-07-23

## 2020-07-23 DIAGNOSIS — E11.40 TYPE 2 DIABETES MELLITUS WITH DIABETIC NEUROPATHY, WITHOUT LONG-TERM CURRENT USE OF INSULIN (HCC): Primary | ICD-10-CM

## 2020-07-23 DIAGNOSIS — I10 ESSENTIAL HYPERTENSION: ICD-10-CM

## 2020-07-23 DIAGNOSIS — E78.00 PURE HYPERCHOLESTEROLEMIA: ICD-10-CM

## 2020-07-23 DIAGNOSIS — M15.9 PRIMARY OSTEOARTHRITIS INVOLVING MULTIPLE JOINTS: ICD-10-CM

## 2020-07-23 RX ORDER — DICLOFENAC SODIUM 10 MG/G
GEL TOPICAL
Qty: 300 G | Refills: 3 | Status: SHIPPED | OUTPATIENT
Start: 2020-07-23 | End: 2020-12-15

## 2020-07-23 NOTE — PROGRESS NOTES
Felipa Tran is a 79 y.o. male who was seen by synchronous (real-time) audio-video technology on 7/23/2020 for Hypertension; Diabetes; and Cholesterol Problem        Assessment & Plan:   Diagnoses and all orders for this visit:    1. Type 2 diabetes mellitus with diabetic neuropathy, without long-term current use of insulin ([facility])    Last A1c 7.4. He is on glipizide, Tradjenta and jantadueto. Will check,  -     METABOLIC PANEL, COMPREHENSIVE  -     MICROALBUMIN, UR, RAND W/ MICROALB/CREAT RATIO  -     HEMOGLOBIN A1C WITH EAG    2. Primary osteoarthritis involving multiple joints    Using meloxicam.  Also using diclofenac gel. Advised him to use Tylenol more. -     diclofenac (VOLTAREN) 1 % gel; Use top BID    3. Pure hypercholesterolemia    Stable lipid panel. On Pravachol.  -     METABOLIC PANEL, COMPREHENSIVE    4. Essential hypertension    Stable blood pressure. On lisinopril.  -     METABOLIC PANEL, COMPREHENSIVE        I spent at least 25 minutes on this visit with this established patient. Subjective:     Vonda Nunez is doing well. He is staying home most of the time. Has diabetes, blood sugar seems within normal limit. Fasting blood sugar is 117 this morning. Eye checkup up-to-date. On multiple medications. Doing well. Has DJD multiple joint. Using diclofenac gel a lot. Would like to get a refill. Has elevated lipids, on statin. No myalgia. Has hypertension, compliant with medicine. Denies chest pain palpitation or shortness of breath. Labs reviewed with him. Prior to Admission medications    Medication Sig Start Date End Date Taking? Authorizing Provider   diclofenac (VOLTAREN) 1 % gel Use top BID 7/23/20  Yes Brittany Adams MD   fluticasone propionate [facility]) 50 mcg/actuation nasal spray USE 1 SPRAY INTO EACH NOSTRIL DAILY 6/12/20  Yes Rosey Main NP   betamethasone valerate (VALISONE) 0.1 % ointment APPLY TO AFFECTED AREA DAILY.  D/C TRIAMCINOLONE 6/1/20  Yes Brittany Adams MD donepeziL (ARICEPT) 5 mg tablet TAKE 1 TABLET BY MOUTH EVERY DAY AT NIGHT 5/27/20  Yes Lalla Felty, MD   glucose blood VI test strips (ASCENSIA AUTODISC VI, ONE TOUCH ULTRA TEST VI) strip Use to test blood sugar once daily. DX: E11.9 Type 2 diabetes mellitus without complication. 4/29/20  Yes Lalla Felty, MD   lancets misc Use to test blood sugars once daily. DX: E11.9 Type 2 diabetes mellitus without complication. 4/29/20  Yes Lalla Felty, MD   Blood-Glucose Meter monitoring kit Use to test blood sugars once daily. DX: E11.9 Type 2 diabetes mellitus without complication. 4/29/20  Yes Lalla Felty, MD   Tradjenta 5 mg tablet TAKE 1 TABLET BY MOUTH EVERY DAY 4/27/20  Yes Lalla Felty, MD   Blood-Glucose Meter monitoring kit Use to monitor blood sugar BID for DX: E11.9 Type 2 diabetes mellitus without complication 8/48/78  Yes Leatha Main NP   glucose blood VI test strips (ASCENSIA AUTODISC VI, ONE TOUCH ULTRA TEST VI) strip Use to monitor blood sugar BID for DX: E11.9 Type 2 diabetes mellitus without complication 7/81/28  Yes Leatha Main NP   lancets misc Use to monitor blood sugar BID for DX: E11.9 Type 2 diabetes mellitus without complication 7/78/90  Yes Leatha Main, NP   famotidine (PEPCID) 20 mg tablet Take 1 Tab by mouth two (2) times a day. 4/20/20  Yes Lalla Felty, MD   linagliptin-metFORMIN (Jentadueto) 2.5-1,000 mg per tablet Take 1 Tab by mouth two (2) times daily (with meals). 3/24/20  Yes Lalla Felty, MD   tamsulosin (FLOMAX) 0.4 mg capsule TAKE 1 CAPSULE BY MOUTH EVERY DAY 3/4/20  Yes Lalla Felty, MD   glipiZIDE SR (GLUCOTROL XL) 5 mg CR tablet TAKE 1 TABLET BY MOUTH EVERY DAY 3/2/20  Yes Lalla Felty, MD   lisinopril (PRINIVIL, ZESTRIL) 10 mg tablet TAKE 1 TABLET BY MOUTH EVERY DAY 2/17/20  Yes Leatha Main NP   pravastatin (PRAVACHOL) 20 mg tablet TAKE 1 TAB BY MOUTH NIGHTLY.  D/C SIMVASTATIN 2/3/20  Yes Leatha Main, NP   aspirin 81 mg chewable tablet TAKE 1 TABLET BY MOUTH EVERY OTHER DAY 12/23/19  Yes Dianne Spring MD   cholecalciferol (VITAMIN D3) 1,000 unit cap TAKE 1 CAPSULE BY MOUTH EVERY DAY 11/25/19  Yes Cheryle Quan, NP   meloxicam (MOBIC) 15 mg tablet Take 1 Tab by mouth daily. 11/19/19  Yes Dianne Spring MD   ondansetron (ZOFRAN ODT) 4 mg disintegrating tablet Take 1 Tab by mouth daily as needed for Nausea. 9/16/19  Yes Dianne Spring MD   gabapentin (NEURONTIN) 100 mg capsule TAKE 1 CAPSULE BY MOUTH TWICE A DAY 9/16/19  Yes Dianne Spring MD   hydrOXYzine HCl (ATARAX) 10 mg tablet TAKE 1-2 TABLETS BY MOUTH AT BEDTIME AS NEEDED ITCHING 5/2/19  Yes Provider, Historical   SENNA PLUS 8.6-50 mg per tablet TAKE 1 TABLET BY MOUTH EVERY DAY 3/25/19  Yes Shayy Castaneda NP   loratadine (CLARITIN) 10 mg tablet Take 1 Tab by mouth daily as needed for Allergies. 6/4/18  Yes Dianne Spring MD   acetaminophen (TYLENOL) 500 mg tablet Take 1 Tab by mouth two (2) times daily as needed. 2/26/18  Yes Dianne Spring MD   IRON 325 mg (65 mg iron) tablet TAKE 1 TABLET BY MOUTH DAILY (BEFORE BREAKFAST). 12/30/14  Yes Dianne Spring MD   peg 400-propylene glycol (SYSTANE) 0.4-0.3 % Drop Administer 1 Drop to left eye as needed. Yes Provider, Historical   diclofenac (VOLTAREN) 1 % gel APPLY 2 GRAMS TO AFFECTED AREA TWO (2) TIMES A DAY. 6/15/20 7/23/20  Dianne Spring MD   Janumet 50-1,000 mg per tablet TAKE 1 TAB BY MOUTH TWO (2) TIMES DAILY (WITH MEALS). D/C JANUMET XR 3/18/20 7/23/20  Dianne Spring MD   Junious Locket (ULTRA-LIGHT ROLLATOR) misc Use it everyday 1/14/19   Dianne Spring MD     Past Medical History:   Diagnosis Date    Chronic kidney disease     DJD (degenerative joint disease)     DM (diabetes mellitus) ([address]     type ll    HTN (hypertension)     Other and unspecified hyperlipidemia        ROS significant for joint pain.     Objective:     Patient-Reported Vitals 7/23/2020   Patient-Reported Weight 150lb   Patient-Reported Height 5f4        Constitutional: [x] Appears well-developed and well-nourished [x] No apparent distress      [] Abnormal -     Mental status: [x] Alert and awake  [x] Oriented to person/place/time [x] Able to follow commands    [] Abnormal -     -     HENT: [x] Normocephalic, atraumatic  [] Abnormal -   [x] Mouth/Throat: Mucous membranes are moist    External Ears [x] Normal  [] Abnormal -    Neck: [x] No visualized mass [] Abnormal -     Pulmonary/Chest: [x] Respiratory effort normal   [x] No visualized signs of difficulty breathing or respiratory distress        [] Abnormal -      Musculoskeletal:   [x] Normal gait with no signs of ataxia        Knee joint: Pain and tenderness present, range of motion restricted. Neurological:        [x] No Facial Asymmetry (Cranial nerve 7 motor function) (limited exam due to video visit)          [x] No gaze palsy        [] Abnormal -             Psychiatric:       [x] Normal Affect [] Abnormal -        [x] No Hallucinations    Other pertinent observable physical exam findings:-        We discussed the expected course, resolution and complications of the diagnosis(es) in detail. Medication risks, benefits, costs, interactions, and alternatives were discussed as indicated. I advised him to contact the office if his condition worsens, changes or fails to improve as anticipated. He expressed understanding with the diagnosis(es) and plan. Hesham Sathish, who was evaluated through a patient-initiated, synchronous (real-time) audio-video encounter, and/or his healthcare decision maker, is aware that it is a billable service, with coverage as determined by his insurance carrier. He provided verbal consent to proceed: Yes, and patient identification was verified. It was conducted pursuant to the emergency declaration under the [address] [place] [address], [address] [place] [address] and the [employer] and [employer] General Act. A caregiver was present when appropriate.  Ability to conduct physical exam was limited. I was in the office. The patient was at home.       Valarie Masterson MD

## 2020-07-23 NOTE — PROGRESS NOTES
Health Maintenance Due   Topic Date Due    Shingrix Vaccine Age 49> (1 of 2) 01/07/1989    GLAUCOMA SCREENING Q2Y  02/14/2017    Eye Exam Retinal or Dilated  02/14/2017    A1C test (Diabetic or Prediabetic)  07/13/2020       Chief Complaint   Patient presents with    Hypertension    Diabetes    Cholesterol Problem       1. Have you been to the ER, urgent care clinic since your last visit? Hospitalized since your last visit? No    2. Have you seen or consulted any other health care providers outside of the [address] [place] [address] since your last visit? Include any pap smears or colon screening. No    3) Do you have an Advance Directive on file? no    4) Are you interested in receiving information on Advance Directives? NO      Patient is accompanied by son I have received verbal consent from [address] [place] [address] to discuss any/all medical information while they are present in the room.

## 2020-08-03 RX ORDER — PRAVASTATIN SODIUM 20 MG/1
20 TABLET ORAL
Qty: 90 TAB | Refills: 1 | Status: SHIPPED | OUTPATIENT
Start: 2020-08-03 | End: 2021-01-29

## 2020-08-07 DIAGNOSIS — E11.40 TYPE 2 DIABETES MELLITUS WITH DIABETIC NEUROPATHY, WITHOUT LONG-TERM CURRENT USE OF INSULIN (HCC): ICD-10-CM

## 2020-08-07 RX ORDER — GLIPIZIDE 5 MG/1
TABLET, FILM COATED, EXTENDED RELEASE ORAL
Qty: 90 TAB | Refills: 1 | Status: SHIPPED | OUTPATIENT
Start: 2020-08-07 | End: 2021-01-13

## 2020-08-07 RX ORDER — TAMSULOSIN HYDROCHLORIDE 0.4 MG/1
CAPSULE ORAL
Qty: 90 CAP | Refills: 1 | Status: SHIPPED | OUTPATIENT
Start: 2020-08-07 | End: 2021-02-03

## 2020-08-07 RX ORDER — LINAGLIPTIN AND METFORMIN HYDROCHLORIDE 2.5; 1 MG/1; MG/1
TABLET, FILM COATED ORAL
Qty: 60 TAB | Refills: 5 | Status: SHIPPED | OUTPATIENT
Start: 2020-08-07 | End: 2021-01-08 | Stop reason: SDUPTHER

## 2020-08-13 DIAGNOSIS — L25.9 CONTACT DERMATITIS, UNSPECIFIED CONTACT DERMATITIS TYPE, UNSPECIFIED TRIGGER: ICD-10-CM

## 2020-08-13 RX ORDER — TRIAMCINOLONE ACETONIDE 1 MG/G
CREAM TOPICAL
Qty: 15 G | Refills: 0 | Status: SHIPPED | OUTPATIENT
Start: 2020-08-13 | End: 2020-09-08

## 2020-08-19 ENCOUNTER — HOSPITAL ENCOUNTER (OUTPATIENT)
Dept: LAB | Age: 80
Discharge: HOME | End: 2020-08-19
Payer: MEDICARE

## 2020-08-19 PROCEDURE — 82043 UR ALBUMIN QUANTITATIVE: CPT

## 2020-08-19 PROCEDURE — 80053 COMPREHEN METABOLIC PANEL: CPT

## 2020-08-19 PROCEDURE — 36415 COLL VENOUS BLD VENIPUNCTURE: CPT

## 2020-08-19 PROCEDURE — 83036 HEMOGLOBIN GLYCOSYLATED A1C: CPT

## 2020-08-20 LAB
ALBUMIN SERPL-MCNC: 4.1 G/DL (ref 3.6–4.6)
ALBUMIN/CREAT UR: 11 MG/G CREAT (ref 0–29)
ALBUMIN/GLOB SERPL: 1.5 {RATIO} (ref 1.2–2.2)
ALP SERPL-CCNC: 56 IU/L (ref 39–117)
ALT SERPL-CCNC: 17 IU/L (ref 0–44)
AST SERPL-CCNC: 21 IU/L (ref 0–40)
BILIRUB SERPL-MCNC: 0.2 MG/DL (ref 0–1.2)
BUN SERPL-MCNC: 17 MG/DL (ref 8–27)
BUN/CREAT SERPL: 17 (ref 10–24)
CALCIUM SERPL-MCNC: 9.3 MG/DL (ref 8.6–10.2)
CHLORIDE SERPL-SCNC: 103 MMOL/L (ref 96–106)
CO2 SERPL-SCNC: 27 MMOL/L (ref 20–29)
CREAT SERPL-MCNC: 0.98 MG/DL (ref 0.76–1.27)
CREAT UR-MCNC: 51.6 MG/DL
EST. AVERAGE GLUCOSE BLD GHB EST-MCNC: 177 MG/DL
GLOBULIN SER CALC-MCNC: 2.7 G/DL (ref 1.5–4.5)
GLUCOSE SERPL-MCNC: 104 MG/DL (ref 65–99)
HBA1C MFR BLD: 7.8 % (ref 4.8–5.6)
MICROALBUMIN UR-MCNC: 5.9 UG/ML
POTASSIUM SERPL-SCNC: 4.2 MMOL/L (ref 3.5–5.2)
PROT SERPL-MCNC: 6.8 G/DL (ref 6–8.5)
SODIUM SERPL-SCNC: 143 MMOL/L (ref 134–144)

## 2020-09-07 DIAGNOSIS — L25.9 CONTACT DERMATITIS, UNSPECIFIED CONTACT DERMATITIS TYPE, UNSPECIFIED TRIGGER: ICD-10-CM

## 2020-09-07 DIAGNOSIS — L30.9 DERMATITIS: ICD-10-CM

## 2020-09-08 RX ORDER — BETAMETHASONE VALERATE 1.2 MG/G
OINTMENT TOPICAL
Qty: 45 G | Refills: 2 | Status: SHIPPED | OUTPATIENT
Start: 2020-09-08 | End: 2021-05-19

## 2020-09-08 RX ORDER — GLUCOSAMINE SULFATE 1500 MG
POWDER IN PACKET (EA) ORAL
Qty: 30 CAP | Refills: 8 | Status: SHIPPED | OUTPATIENT
Start: 2020-09-08 | End: 2021-06-07

## 2020-09-08 RX ORDER — TRIAMCINOLONE ACETONIDE 1 MG/G
CREAM TOPICAL
Qty: 15 G | Refills: 0 | Status: SHIPPED | OUTPATIENT
Start: 2020-09-08 | End: 2020-10-26

## 2020-10-10 RX ORDER — FAMOTIDINE 20 MG/1
TABLET, FILM COATED ORAL
Qty: 180 TAB | Refills: 1 | Status: SHIPPED | OUTPATIENT
Start: 2020-10-10 | End: 2021-05-03

## 2020-10-15 RX ORDER — FLUTICASONE PROPIONATE 50 MCG
SPRAY, SUSPENSION (ML) NASAL
Qty: 1 BOTTLE | Refills: 1 | Status: SHIPPED | OUTPATIENT
Start: 2020-10-15 | End: 2020-11-09

## 2020-10-22 DIAGNOSIS — E11.9 TYPE 2 DIABETES MELLITUS WITHOUT COMPLICATION, WITHOUT LONG-TERM CURRENT USE OF INSULIN (HCC): ICD-10-CM

## 2020-10-22 RX ORDER — LINAGLIPTIN 5 MG/1
TABLET, FILM COATED ORAL
Qty: 90 TAB | Refills: 1 | Status: SHIPPED | OUTPATIENT
Start: 2020-10-22 | End: 2021-07-15 | Stop reason: ALTCHOICE

## 2020-10-25 DIAGNOSIS — L25.9 CONTACT DERMATITIS, UNSPECIFIED CONTACT DERMATITIS TYPE, UNSPECIFIED TRIGGER: ICD-10-CM

## 2020-10-26 RX ORDER — TRIAMCINOLONE ACETONIDE 1 MG/G
CREAM TOPICAL
Qty: 15 G | Refills: 0 | Status: SHIPPED | OUTPATIENT
Start: 2020-10-26 | End: 2021-03-22

## 2020-11-04 RX ORDER — LISINOPRIL 10 MG/1
TABLET ORAL
Qty: 90 TAB | Refills: 2 | Status: SHIPPED | OUTPATIENT
Start: 2020-11-04 | End: 2021-07-15 | Stop reason: SDUPTHER

## 2020-11-09 RX ORDER — FLUTICASONE PROPIONATE 50 MCG
SPRAY, SUSPENSION (ML) NASAL
Qty: 1 BOTTLE | Refills: 1 | Status: SHIPPED | OUTPATIENT
Start: 2020-11-09 | End: 2020-12-07

## 2020-12-07 RX ORDER — FLUTICASONE PROPIONATE 50 MCG
SPRAY, SUSPENSION (ML) NASAL
Qty: 1 BOTTLE | Refills: 1 | Status: SHIPPED | OUTPATIENT
Start: 2020-12-07 | End: 2021-01-05

## 2020-12-15 DIAGNOSIS — M15.9 PRIMARY OSTEOARTHRITIS INVOLVING MULTIPLE JOINTS: ICD-10-CM

## 2020-12-15 RX ORDER — DICLOFENAC SODIUM 10 MG/G
GEL TOPICAL
Qty: 300 G | Refills: 3 | Status: SHIPPED | OUTPATIENT
Start: 2020-12-15 | End: 2021-05-06

## 2021-01-05 RX ORDER — FLUTICASONE PROPIONATE 50 MCG
SPRAY, SUSPENSION (ML) NASAL
Qty: 1 BOTTLE | Refills: 1 | Status: SHIPPED | OUTPATIENT
Start: 2021-01-05 | End: 2021-01-14 | Stop reason: SDUPTHER

## 2021-01-08 ENCOUNTER — VIRTUAL VISIT (OUTPATIENT)
Dept: INTERNAL MEDICINE CLINIC | Age: 81
End: 2021-01-08
Payer: MEDICARE

## 2021-01-08 DIAGNOSIS — E78.00 PURE HYPERCHOLESTEROLEMIA: ICD-10-CM

## 2021-01-08 DIAGNOSIS — I10 ESSENTIAL HYPERTENSION: ICD-10-CM

## 2021-01-08 DIAGNOSIS — F02.80 ALZHEIMER'S DEMENTIA WITHOUT BEHAVIORAL DISTURBANCE, UNSPECIFIED TIMING OF DEMENTIA ONSET: ICD-10-CM

## 2021-01-08 DIAGNOSIS — M15.9 PRIMARY OSTEOARTHRITIS INVOLVING MULTIPLE JOINTS: Primary | ICD-10-CM

## 2021-01-08 DIAGNOSIS — G30.9 ALZHEIMER'S DEMENTIA WITHOUT BEHAVIORAL DISTURBANCE, UNSPECIFIED TIMING OF DEMENTIA ONSET: ICD-10-CM

## 2021-01-08 DIAGNOSIS — E11.40 TYPE 2 DIABETES MELLITUS WITH DIABETIC NEUROPATHY, WITHOUT LONG-TERM CURRENT USE OF INSULIN (HCC): ICD-10-CM

## 2021-01-08 PROCEDURE — 99442 PR PHYS/QHP TELEPHONE EVALUATION 11-20 MIN: CPT | Performed by: NURSE PRACTITIONER

## 2021-01-08 RX ORDER — LINAGLIPTIN AND METFORMIN HYDROCHLORIDE 2.5; 1 MG/1; MG/1
TABLET, FILM COATED ORAL
Qty: 60 TAB | Refills: 5 | Status: SHIPPED | OUTPATIENT
Start: 2021-01-08 | End: 2021-07-15 | Stop reason: SDUPTHER

## 2021-01-08 NOTE — PROGRESS NOTES
ADVISED PATIENT OF THE FOLLOWING HEALTH MAINTAINCE DUE  Health Maintenance Due   Topic Date Due    Shingrix Vaccine Age 49> (1 of 2) 01/07/1989    GLAUCOMA SCREENING Q2Y  02/14/2017    Eye Exam Retinal or Dilated  02/14/2017    Medicare Yearly Exam  09/16/2020    Foot Exam Q1  09/16/2020    Lipid Screen  01/13/2021      Chief Complaint   Patient presents with    Hypertension     f/u     Diabetes    Cholesterol Problem    Vitamin D Deficiency    Gait Problem     family is worried he may fall ,        1. Have you been to the ER, urgent care clinic since your last visit? Hospitalized since your last visit? No    2. Have you seen or consulted any other health care providers outside of the [address] [place] [address] since your last visit? Include any DEXA scan, mammography  or colon screening. No    3. Do you have an Advance Directive on file? no    4. Do you have a DNR on file? no    Patient is accompanied by self I have received verbal consent from [address] [place] [address] to discuss any/all medical information while they are present in the room. No flowsheet data found. [facility][id number] Marcello Saxena, [address] [address]  Phone: [phone] Fax: [phone]        Patient reminded during visit to bring all medication bottles, OTC medications to all appointments.

## 2021-01-08 NOTE — PROGRESS NOTES
Slava Thorpe is a 79 y.o. male, evaluated via audio-only technology on 1/8/2021 for Hypertension (f/u ), Diabetes, Cholesterol Problem, Vitamin D Deficiency, and Gait Problem (family is worried he may fall , )  . Assessment & Plan:   Diagnoses and all orders for this visit:    1. Primary osteoarthritis involving multiple joints    2. Type 2 diabetes mellitus with diabetic neuropathy, without long-term current use of insulin ([facility]    3. Essential hypertension    4. Pure hypercholesterolemia    5. Alzheimer's dementia without behavioral disturbance, unspecified timing of dementia onset ([facility]    Will order   CBC WITH AUTOMATED DIFF    METABOLIC PANEL, COMPREHENSIVE    TSH 3RD GENERATION    LIPID PANEL    HEMOGLOBIN A1C WITH EAG     Will refill, per request:   linagliptin-metFORMIN (Jentadueto) 2.5-1,000 mg per tablet     Sig: TAKE 1 TABLET BY MOUTH TWICE A DAY WITH MEALS     Dispense:  60 Tab     Refill:  5     Fall precautions. Patient encouraged to call or return to office if symptoms do not improve or worsen. Medications reviewed. Reviewed plan of care with patient who acknowledges understanding and agrees. Follow-up with Dr. Jr Álvarez in 6 months, or sooner as needed. 12  Subjective: This is a patient of Dr. Jr Álvarez who presents today for follow-up and medication refills. The patient is generally feeling well at this time. He and his family are practicing Covid precautions and the patient has been primarily staying at home with his son. Patient's son is providing assistance with ADLs. Patient uses a walker for ambulation and has ongoing joint pain from arthritis. Prior to Admission medications    Medication Sig Start Date End Date Taking?  Authorizing Provider   fluticasone propionate (FLONASE) 50 mcg/actuation nasal spray USE 1 SPRAY INTO EACH NOSTRIL DAILY 1/5/21  Yes Nica Main NP   diclofenac (VOLTAREN) 1 % gel USE AS DIRECTED TWICE A DAY 12/15/20  Yes Jammie Cartwright MD lisinopriL (PRINIVIL, ZESTRIL) 10 mg tablet TAKE 1 TABLET BY MOUTH EVERY DAY 11/4/20  Yes Swetha Main NP   triamcinolone acetonide (KENALOG) 0.1 % topical cream APPLY THIN LAYER TO AFFECTED AREA TWICE DAILY 10/26/20  Yes Brooklyn Costa MD   Tradjenta 5 mg tablet TAKE 1 TABLET BY MOUTH EVERY DAY 10/22/20  Yes Brooklyn Costa MD   OneTouch Ultra Blue Test Strip strip USE TO TEST BLOOD SUGAR ONCE DAILY. DX: E11.9 TYPE 2 DIABETES MELLITUS WITHOUT COMPLICATION. 10/19/20  Yes Brooklyn Costa MD   famotidine (PEPCID) 20 mg tablet TAKE 1 TABLET BY MOUTH TWICE A DAY 10/10/20  Yes Sola Oliver NP   cholecalciferol (VITAMIN D3) 25 mcg (1,000 unit) cap TAKE 1 CAPSULE BY MOUTH EVERY DAY 9/8/20  Yes Swetha Main NP   betamethasone valerate (VALISONE) 0.1 % ointment APPLY TO AFFECTED AREA DAILY. D/C TRIAMCINOLONE 9/8/20  Yes Brooklyn Costa MD   glipiZIDE SR (GLUCOTROL XL) 5 mg CR tablet TAKE 1 TABLET BY MOUTH EVERY DAY 8/7/20  Yes Sola Oliver NP   tamsulosin (FLOMAX) 0.4 mg capsule TAKE 1 CAPSULE BY MOUTH EVERY DAY 8/7/20  Yes Sola Oliver NP   Jentadueto 2.5-1,000 mg per tablet TAKE 1 TABLET BY MOUTH TWICE A DAY WITH MEALS 8/7/20  Yes Sola Oliver NP   pravastatin (PRAVACHOL) 20 mg tablet TAKE 1 TAB BY MOUTH NIGHTLY. D/C SIMVASTATIN 8/3/20  Yes Swetha Main NP   donepeziL (ARICEPT) 5 mg tablet TAKE 1 TABLET BY MOUTH EVERY DAY AT NIGHT 5/27/20  Yes Brooklyn Costa MD   lancets misc Use to test blood sugars once daily. DX: E11.9 Type 2 diabetes mellitus without complication. 4/29/20  Yes Brooklyn Costa MD   Blood-Glucose Meter monitoring kit Use to test blood sugars once daily. DX: E11.9 Type 2 diabetes mellitus without complication.  4/29/20  Yes Brooklyn Costa MD   Blood-Glucose Meter monitoring kit Use to monitor blood sugar BID for DX: E11.9 Type 2 diabetes mellitus without complication 1/79/87  Yes Swetha Main NP   glucose blood VI test strips (ASCENSIA AUTODISC VI, ONE TOUCH ULTRA TEST VI) strip Use to monitor blood sugar BID for DX: E11.9 Type 2 diabetes mellitus without complication 8/70/97  Yes Chris Main, NP   lancets misc Use to monitor blood sugar BID for DX: E11.9 Type 2 diabetes mellitus without complication 5/72/15  Yes Chris Main, NP   aspirin 81 mg chewable tablet TAKE 1 TABLET BY MOUTH EVERY OTHER DAY 12/23/19  Yes Arias Pike MD   meloxicam (MOBIC) 15 mg tablet Take 1 Tab by mouth daily. 11/19/19  Yes Arias Pike MD   ondansetron (ZOFRAN ODT) 4 mg disintegrating tablet Take 1 Tab by mouth daily as needed for Nausea. 9/16/19  Yes Arias Pike MD   gabapentin (NEURONTIN) 100 mg capsule TAKE 1 CAPSULE BY MOUTH TWICE A DAY 9/16/19  Yes Arias Pike MD   hydrOXYzine HCl (ATARAX) 10 mg tablet TAKE 1-2 TABLETS BY MOUTH AT BEDTIME AS NEEDED ITCHING 5/2/19  Yes Provider, Historical   SENNA PLUS 8.6-50 mg per tablet TAKE 1 TABLET BY MOUTH EVERY DAY 3/25/19  Yes Paula Castaneda NP   Walker (ULTRA-LIGHT ROLLATOR) misc Use it everyday 1/14/19  Yes Arias Pike MD   loratadine (CLARITIN) 10 mg tablet Take 1 Tab by mouth daily as needed for Allergies. 6/4/18  Yes Arias Pike MD   acetaminophen (TYLENOL) 500 mg tablet Take 1 Tab by mouth two (2) times daily as needed. 2/26/18  Yes Arias Pike MD   IRON 325 mg (65 mg iron) tablet TAKE 1 TABLET BY MOUTH DAILY (BEFORE BREAKFAST). 12/30/14  Yes Arias Pike MD   peg 400-propylene glycol (SYSTANE) 0.4-0.3 % Drop Administer 1 Drop to left eye as needed. Yes Provider, Historical     Allergies   Allergen Reactions    Amoxicillin Nausea and Vomiting    Hydrocod-Cpm-Pe-Acetaminophen Other (comments)     GI Upset    Seafood [Shellfish Containing Products] Rash     Past Medical History:   Diagnosis Date    Chronic kidney disease     DJD (degenerative joint disease)     DM [facility]diabetes mellitus) ([facility]     type ll    HTN (hypertension)     Other and unspecified hyperlipidemia      History reviewed.  No pertinent surgical history. Review of Systems   Constitutional: Negative for chills and fever. HENT: Negative. Respiratory: Negative. Cardiovascular: Negative. Gastrointestinal: Negative. Genitourinary: Negative. Musculoskeletal: Positive for joint pain. Skin: Negative. Neurological: Negative. Endo/Heme/Allergies: Negative. Psychiatric/Behavioral: Negative. Patient-Reported Vitals 7/23/2020   Patient-Reported Weight 150lb   Patient-Reported Height 5f4        Sana Platt, who was evaluated through a patient-initiated, synchronous (real-time) audio only encounter, and/or her healthcare decision maker, is aware that it is a billable service, with coverage as determined by his insurance carrier. He provided verbal consent to proceed: Yes. He has not had a related appointment within my department in the past 7 days or scheduled within the next 24 hours.       Total Time: minutes: 11-20 minutes    Bryant Erwin NP

## 2021-01-13 DIAGNOSIS — E11.40 TYPE 2 DIABETES MELLITUS WITH DIABETIC NEUROPATHY, WITHOUT LONG-TERM CURRENT USE OF INSULIN (HCC): ICD-10-CM

## 2021-01-13 RX ORDER — GLIPIZIDE 5 MG/1
TABLET, FILM COATED, EXTENDED RELEASE ORAL
Qty: 90 TAB | Refills: 1 | Status: SHIPPED | OUTPATIENT
Start: 2021-01-13 | End: 2021-06-23

## 2021-01-14 RX ORDER — FLUTICASONE PROPIONATE 50 MCG
SPRAY, SUSPENSION (ML) NASAL
Qty: 1 BOTTLE | Refills: 1 | Status: SHIPPED | OUTPATIENT
Start: 2021-01-14 | End: 2021-04-01

## 2021-01-14 NOTE — TELEPHONE ENCOUNTER
[facility] requested 90 day supply of   Requested Prescriptions     Pending Prescriptions Disp Refills    fluticasone propionate (FLONASE) 50 mcg/actuation nasal spray 1 Bottle 1     01/08/2021  No upcoming

## 2021-01-29 RX ORDER — PRAVASTATIN SODIUM 20 MG/1
TABLET ORAL
Qty: 90 TAB | Refills: 1 | Status: SHIPPED | OUTPATIENT
Start: 2021-01-29 | End: 2021-07-15 | Stop reason: SDUPTHER

## 2021-02-03 RX ORDER — TAMSULOSIN HYDROCHLORIDE 0.4 MG/1
CAPSULE ORAL
Qty: 90 CAP | Refills: 1 | Status: SHIPPED | OUTPATIENT
Start: 2021-02-03 | End: 2021-08-02

## 2021-02-26 DIAGNOSIS — D64.9 ANEMIA, UNSPECIFIED TYPE: Primary | ICD-10-CM

## 2021-02-26 LAB
ALBUMIN SERPL-MCNC: 4.2 G/DL (ref 3.6–4.6)
ALBUMIN/GLOB SERPL: 1.5 {RATIO} (ref 1.2–2.2)
ALP SERPL-CCNC: 70 IU/L (ref 39–117)
ALT SERPL-CCNC: 11 IU/L (ref 0–44)
AST SERPL-CCNC: 17 IU/L (ref 0–40)
BASOPHILS # BLD AUTO: 0 X10E3/UL (ref 0–0.2)
BASOPHILS NFR BLD AUTO: 1 %
BILIRUB SERPL-MCNC: 0.2 MG/DL (ref 0–1.2)
BUN SERPL-MCNC: 15 MG/DL (ref 8–27)
BUN/CREAT SERPL: 18 (ref 10–24)
CALCIUM SERPL-MCNC: 9.3 MG/DL (ref 8.6–10.2)
CHLORIDE SERPL-SCNC: 103 MMOL/L (ref 96–106)
CHOLEST SERPL-MCNC: 139 MG/DL (ref 100–199)
CO2 SERPL-SCNC: 26 MMOL/L (ref 20–29)
CREAT SERPL-MCNC: 0.83 MG/DL (ref 0.76–1.27)
EOSINOPHIL # BLD AUTO: 0.3 X10E3/UL (ref 0–0.4)
EOSINOPHIL NFR BLD AUTO: 5 %
ERYTHROCYTE [DISTWIDTH] IN BLOOD BY AUTOMATED COUNT: 17.2 % (ref 11.6–15.4)
EST. AVERAGE GLUCOSE BLD GHB EST-MCNC: 163 MG/DL
GLOBULIN SER CALC-MCNC: 2.8 G/DL (ref 1.5–4.5)
GLUCOSE SERPL-MCNC: 94 MG/DL (ref 65–99)
HBA1C MFR BLD: 7.3 % (ref 4.8–5.6)
HCT VFR BLD AUTO: 36.7 % (ref 37.5–51)
HDLC SERPL-MCNC: 59 MG/DL
HGB BLD-MCNC: 10.5 G/DL (ref 13–17.7)
IMM GRANULOCYTES # BLD AUTO: 0 X10E3/UL (ref 0–0.1)
IMM GRANULOCYTES NFR BLD AUTO: 0 %
INTERPRETATION, 910389: NORMAL
LDLC SERPL CALC-MCNC: 67 MG/DL (ref 0–99)
LYMPHOCYTES # BLD AUTO: 2.2 X10E3/UL (ref 0.7–3.1)
LYMPHOCYTES NFR BLD AUTO: 34 %
Lab: NORMAL
MCH RBC QN AUTO: 21.9 PG (ref 26.6–33)
MCHC RBC AUTO-ENTMCNC: 28.6 G/DL (ref 31.5–35.7)
MCV RBC AUTO: 77 FL (ref 79–97)
MONOCYTES # BLD AUTO: 0.6 X10E3/UL (ref 0.1–0.9)
MONOCYTES NFR BLD AUTO: 9 %
NEUTROPHILS # BLD AUTO: 3.4 X10E3/UL (ref 1.4–7)
NEUTROPHILS NFR BLD AUTO: 51 %
PLATELET # BLD AUTO: 324 X10E3/UL (ref 150–450)
POTASSIUM SERPL-SCNC: 4.2 MMOL/L (ref 3.5–5.2)
PROT SERPL-MCNC: 7 G/DL (ref 6–8.5)
RBC # BLD AUTO: 4.8 X10E6/UL (ref 4.14–5.8)
SODIUM SERPL-SCNC: 144 MMOL/L (ref 134–144)
TRIGL SERPL-MCNC: 64 MG/DL (ref 0–149)
TSH SERPL DL<=0.005 MIU/L-ACNC: 0.57 UIU/ML (ref 0.45–4.5)
VLDLC SERPL CALC-MCNC: 13 MG/DL (ref 5–40)
WBC # BLD AUTO: 6.6 X10E3/UL (ref 3.4–10.8)

## 2021-02-26 NOTE — PROGRESS NOTES
Hgb decreased. Please notify of any s/sx of bleeding. Repeat CBC, iron panel, ferritin in 4 weeks. Otherwise, stable labs.

## 2021-03-03 ENCOUNTER — VIRTUAL VISIT (OUTPATIENT)
Dept: INTERNAL MEDICINE CLINIC | Age: 81
End: 2021-03-03
Payer: MEDICARE

## 2021-03-03 DIAGNOSIS — E11.9 TYPE 2 DIABETES MELLITUS WITHOUT COMPLICATION, WITHOUT LONG-TERM CURRENT USE OF INSULIN (HCC): Primary | ICD-10-CM

## 2021-03-03 DIAGNOSIS — I10 ESSENTIAL HYPERTENSION: ICD-10-CM

## 2021-03-03 DIAGNOSIS — D64.9 ANEMIA, UNSPECIFIED TYPE: ICD-10-CM

## 2021-03-03 DIAGNOSIS — M47.22 OSTEOARTHRITIS OF SPINE WITH RADICULOPATHY, CERVICAL REGION: ICD-10-CM

## 2021-03-03 DIAGNOSIS — E11.49 OTHER DIABETIC NEUROLOGICAL COMPLICATION ASSOCIATED WITH TYPE 2 DIABETES MELLITUS (HCC): ICD-10-CM

## 2021-03-03 DIAGNOSIS — E78.00 PURE HYPERCHOLESTEROLEMIA: ICD-10-CM

## 2021-03-03 PROCEDURE — G0463 HOSPITAL OUTPT CLINIC VISIT: HCPCS | Performed by: INTERNAL MEDICINE

## 2021-03-03 PROCEDURE — G8756 NO BP MEASURE DOC: HCPCS | Performed by: INTERNAL MEDICINE

## 2021-03-03 PROCEDURE — 1101F PT FALLS ASSESS-DOCD LE1/YR: CPT | Performed by: INTERNAL MEDICINE

## 2021-03-03 PROCEDURE — G8536 NO DOC ELDER MAL SCRN: HCPCS | Performed by: INTERNAL MEDICINE

## 2021-03-03 PROCEDURE — 99214 OFFICE O/P EST MOD 30 MIN: CPT | Performed by: INTERNAL MEDICINE

## 2021-03-03 PROCEDURE — G8421 BMI NOT CALCULATED: HCPCS | Performed by: INTERNAL MEDICINE

## 2021-03-03 PROCEDURE — G8510 SCR DEP NEG, NO PLAN REQD: HCPCS | Performed by: INTERNAL MEDICINE

## 2021-03-03 PROCEDURE — G8428 CUR MEDS NOT DOCUMENT: HCPCS | Performed by: INTERNAL MEDICINE

## 2021-03-03 RX ORDER — MELOXICAM 15 MG/1
15 TABLET ORAL DAILY
Qty: 90 TAB | Refills: 1 | Status: SHIPPED | OUTPATIENT
Start: 2021-03-03 | End: 2021-08-30

## 2021-03-03 RX ORDER — IRON,CARBONYL/ASCORBIC ACID 65MG-125MG
TABLET, DELAYED RELEASE (ENTERIC COATED) ORAL
Qty: 30 TAB | Refills: 2 | Status: SHIPPED | OUTPATIENT
Start: 2021-03-03 | End: 2021-03-26 | Stop reason: DRUGHIGH

## 2021-03-03 RX ORDER — GABAPENTIN 100 MG/1
CAPSULE ORAL
Qty: 60 CAP | Refills: 5 | Status: SHIPPED | OUTPATIENT
Start: 2021-03-03 | End: 2021-07-15 | Stop reason: SDUPTHER

## 2021-03-03 NOTE — PROGRESS NOTES
Evens Sanderson is a 79 y.o. male who was seen by synchronous (real-time) audio-video technology on 3/3/2021 for Leg Pain, Medication Refill, and Other (discuss covid vaccine)        Assessment & Plan:   Diagnoses and all orders for this visit:    1. Type 2 diabetes mellitus without complication, without long-term current use of insulin (HCC)  Stable. He is on glipizide, Tradjenta Metformin with the linagliptin. A1c seems stable. Advised to be an ADA diet and exercise. 2. Other diabetic neurological complication associated with type 2 diabetes mellitus (HCC)    We will refill,  -     gabapentin (NEURONTIN) 100 mg capsule; TAKE 1 CAPSULE BY MOUTH TWICE A DAY    3. Osteoarthritis of spine with radiculopathy, cervical region    We will refill,  -     meloxicam (MOBIC) 15 mg tablet; Take 1 Tab by mouth daily. 4. Essential hypertension  Stable blood pressure. On lisinopril. 5. Pure hypercholesterolemia  Stable lipids. On pravastatin. 6. Anemia, unspecified type  -     iron,carbonyl-vitamin C (Vitron-C) 65 mg iron- 125 mg TbEC; 1 tab po am        I spent at least 30 minutes on this visit with this established patient. Subjective:     Mr. Georgean Kocher is here for follow-up. She is accompanied by his son. He is staying home. Has diabetes, on multiple medications. Compliant with medicine. Not checking blood sugar regularly. Eye checkup is not up-to-date. Need foot exam.  Has hypertension, compliant with medicine. Denies chest pain palpitation or shortness of breath. Has elevated lipids, on statin. No myalgia. Has multiple joint pain and aches. He ran out of meloxicam, needed refill. Has neuropathic pain, need gabapentin refill. Lab work done recently, all discussed. Prior to Admission medications    Medication Sig Start Date End Date Taking?  Authorizing Provider   gabapentin (NEURONTIN) 100 mg capsule TAKE 1 CAPSULE BY MOUTH TWICE A DAY 3/3/21  Yes Leilani Alvarado MD   meloxicam (MOBIC) 15 mg tablet Take 1 Tab by mouth daily. 3/3/21  Yes Bradly Russell MD   iron,carbonyl-vitamin C (Vitron-C) 65 mg iron- 125 mg TbEC 1 tab po am 3/3/21  Yes Bradly Russell MD   tamsulosin (FLOMAX) 0.4 mg capsule TAKE 1 CAPSULE BY MOUTH EVERY DAY 2/3/21  Yes Nita Quijano NP   donepeziL (ARICEPT) 5 mg tablet TAKE 1 TABLET BY MOUTH EVERY DAY AT NIGHT 2/3/21  Yes Nita Quijano NP   aspirin 81 mg chewable tablet TAKE 1 TABLET BY MOUTH EVERY OTHER DAY 2/3/21  Yes Nita Quijano NP   pravastatin (PRAVACHOL) 20 mg tablet TAKE 1 TAB BY MOUTH NIGHTLY. STOP SIMVASTATIN 1/29/21  Yes Nita Quijano NP   fluticasone propionate (FLONASE) 50 mcg/actuation nasal spray USE 1 SPRAY INTO EACH NOSTRIL DAILY 1/14/21  Yes Bradly Russell MD   glipiZIDE SR (GLUCOTROL XL) 5 mg CR tablet TAKE 1 TABLET BY MOUTH EVERY DAY 1/13/21  Yes Nita Quijano NP   linagliptin-metFORMIN (Jentadueto) 2.5-1,000 mg per tablet TAKE 1 TABLET BY MOUTH TWICE A DAY WITH MEALS 1/8/21  Yes Nita Quijano NP   diclofenac (VOLTAREN) 1 % gel USE AS DIRECTED TWICE A DAY 12/15/20  Yes Bradly Russell MD   lisinopriL (PRINIVIL, ZESTRIL) 10 mg tablet TAKE 1 TABLET BY MOUTH EVERY DAY 11/4/20  Yes Shahrzad Main NP   triamcinolone acetonide (KENALOG) 0.1 % topical cream APPLY THIN LAYER TO AFFECTED AREA TWICE DAILY 10/26/20  Yes Bradly Russell MD   Tradjenta 5 mg tablet TAKE 1 TABLET BY MOUTH EVERY DAY 10/22/20  Yes Bradly Russell MD   OneTouch Ultra Blue Test Strip strip USE TO TEST BLOOD SUGAR ONCE DAILY. DX: E11.9 TYPE 2 DIABETES MELLITUS WITHOUT COMPLICATION. 10/19/20  Yes Bradly Russell MD   famotidine (PEPCID) 20 mg tablet TAKE 1 TABLET BY MOUTH TWICE A DAY 10/10/20  Yes Nita Quijano NP   cholecalciferol (VITAMIN D3) 25 mcg (1,000 unit) cap TAKE 1 CAPSULE BY MOUTH EVERY DAY 9/8/20  Yes Shahrzad Main NP   betamethasone valerate (VALISONE) 0.1 % ointment APPLY TO AFFECTED AREA DAILY.  D/C TRIAMCINOLONE 9/8/20  Yes Bradly Russell MD   lancets misc Use to test blood sugars once daily. DX: E11.9 Type 2 diabetes mellitus without complication. 4/29/20  Yes Lexis Fritz MD   Blood-Glucose Meter monitoring kit Use to test blood sugars once daily. DX: E11.9 Type 2 diabetes mellitus without complication. 4/29/20  Yes Lexis Fritz MD   Blood-Glucose Meter monitoring kit Use to monitor blood sugar BID for DX: E11.9 Type 2 diabetes mellitus without complication 4/51/09  Yes Yahaira Main, NP   glucose blood VI test strips (ASCENSIA AUTODISC VI, ONE TOUCH ULTRA TEST VI) strip Use to monitor blood sugar BID for DX: E11.9 Type 2 diabetes mellitus without complication 9/84/07  Yes Yahaira Main NP   lancets misc Use to monitor blood sugar BID for DX: E11.9 Type 2 diabetes mellitus without complication 2/79/39  Yes Yahaira Main, NP   ondansetron (ZOFRAN ODT) 4 mg disintegrating tablet Take 1 Tab by mouth daily as needed for Nausea. 9/16/19  Yes Lexis Fritz MD   hydrOXYzine HCl (ATARAX) 10 mg tablet TAKE 1-2 TABLETS BY MOUTH AT BEDTIME AS NEEDED ITCHING 5/2/19  Yes Provider, Historical   SENNA PLUS 8.6-50 mg per tablet TAKE 1 TABLET BY MOUTH EVERY DAY 3/25/19  Yes Tiny Castaneda, FLAVIA   Walker (ULTRA-LIGHT ROLLATOR) misc Use it everyday 1/14/19  Yes Lexis Fritz MD   acetaminophen (TYLENOL) 500 mg tablet Take 1 Tab by mouth two (2) times daily as needed. 2/26/18  Yes Lexis Fritz MD   IRON 325 mg (65 mg iron) tablet TAKE 1 TABLET BY MOUTH DAILY (BEFORE BREAKFAST). 12/30/14  Yes Lexis Fritz MD   peg 400-propylene glycol (SYSTANE) 0.4-0.3 % Drop Administer 1 Drop to left eye as needed. Yes Provider, Historical   meloxicam (MOBIC) 15 mg tablet Take 1 Tab by mouth daily. 11/19/19 3/3/21  Lexis Fritz MD   gabapentin (NEURONTIN) 100 mg capsule TAKE 1 CAPSULE BY MOUTH TWICE A DAY 9/16/19 3/3/21  Lexis Fritz MD   loratadine (CLARITIN) 10 mg tablet Take 1 Tab by mouth daily as needed for Allergies.  6/4/18 3/3/21  Lexis Fritz MD     Past Medical History:   Diagnosis Date  Chronic kidney disease     DJD (degenerative joint disease)     DM (diabetes mellitus) ([facility] [address]     type ll    HTN (hypertension)     Other and unspecified hyperlipidemia        ROS negative    Objective:     Patient-Reported Vitals 7/23/2020   Patient-Reported Weight 150lb   Patient-Reported Height 5f4          Constitutional: [x] Appears well-developed and well-nourished [x] No apparent distress      [] Abnormal -     Mental status: [x] Alert and awake  [x] Oriented to person/place/time [x] Able to follow commands    [] Abnormal -       HENT: [x] Normocephalic, atraumatic  [] Abnormal -   [x] Mouth/Throat: Mucous membranes are moist    External Ears [x] Normal  [] Abnormal -    Neck: [x] No visualized mass [] Abnormal -     Pulmonary/Chest: [x] Respiratory effort normal   [x] No visualized signs of difficulty breathing or respiratory distress        [] Abnormal -      Musculoskeletal:   [x] Normal gait with no signs of ataxia         [x] Normal range of motion of neck        [] Abnormal -     Neurological:        [x] No Facial Asymmetry (Cranial nerve 7 motor function) (limited exam due to video visit)          [x] No gaze palsy        [] Abnormal -                   Psychiatric:       [x] Normal Affect [] Abnormal -        [x] No Hallucinations    Other pertinent observable physical exam findings:-        We discussed the expected course, resolution and complications of the diagnosis(es) in detail. Medication risks, benefits, costs, interactions, and alternatives were discussed as indicated. I advised him to contact the office if his condition worsens, changes or fails to improve as anticipated. He expressed understanding with the diagnosis(es) and plan. Gisele Currie, was evaluated through a synchronous (real-time) audio-video encounter. The patient (or guardian if applicable) is aware that this is a billable service. Verbal consent to proceed has been obtained within the past 12 months.  The visit was conducted pursuant to the emergency declaration under the [facility] [address] authority and the [employer] Act. Patient identification was verified, and a caregiver was present when appropriate. The patient was located in a state where the provider was credentialed to provide care.       Candi Patel MD

## 2021-03-03 NOTE — PROGRESS NOTES
Results for orders placed or performed in visit on 01/08/21   CBC WITH AUTOMATED DIFF   Result Value Ref Range    WBC 6.6 3.4 - 10.8 x10E3/uL    RBC 4.80 4. 14 - 5.80 x10E6/uL    HGB 10.5 (L) 13.0 - 17.7 g/dL    HCT 36.7 (L) 37.5 - 51.0 %    MCV 77 (L) 79 - 97 fL    MCH 21.9 (L) 26.6 - 33.0 pg    MCHC 28.6 (L) 31.5 - 35.7 g/dL    RDW 17.2 (H) 11.6 - 15.4 %    PLATELET 271 745 - 944 x10E3/uL    NEUTROPHILS 51 Not Estab. %    Lymphocytes 34 Not Estab. %    MONOCYTES 9 Not Estab. %    EOSINOPHILS 5 Not Estab. %    BASOPHILS 1 Not Estab. %    ABS. NEUTROPHILS 3.4 1.4 - 7.0 x10E3/uL    Abs Lymphocytes 2.2 0.7 - 3.1 x10E3/uL    ABS. MONOCYTES 0.6 0.1 - 0.9 x10E3/uL    ABS. EOSINOPHILS 0.3 0.0 - 0.4 x10E3/uL    ABS. BASOPHILS 0.0 0.0 - 0.2 x10E3/uL    IMMATURE GRANULOCYTES 0 Not Estab. %    ABS. IMM. GRANS. 0.0 0.0 - 0.1 X31G5/TZ   METABOLIC PANEL, COMPREHENSIVE   Result Value Ref Range    Glucose 94 65 - 99 mg/dL    BUN 15 8 - 27 mg/dL    Creatinine 0.83 0.76 - 1.27 mg/dL    GFR est non-AA 82 >59 mL/min/1.73    GFR est AA 95 >59 mL/min/1.73    BUN/Creatinine ratio 18 10 - 24    Sodium 144 134 - 144 mmol/L    Potassium 4.2 3.5 - 5.2 mmol/L    Chloride 103 96 - 106 mmol/L    CO2 26 20 - 29 mmol/L    Calcium 9.3 8.6 - 10.2 mg/dL    Protein, total 7.0 6.0 - 8.5 g/dL    Albumin 4.2 3.6 - 4.6 g/dL    GLOBULIN, TOTAL 2.8 1.5 - 4.5 g/dL    A-G Ratio 1.5 1.2 - 2.2    Bilirubin, total 0.2 0.0 - 1.2 mg/dL    Alk.  phosphatase 70 39 - 117 IU/L    AST (SGOT) 17 0 - 40 IU/L    ALT (SGPT) 11 0 - 44 IU/L   TSH 3RD GENERATION   Result Value Ref Range    TSH 0.575 0.450 - 4.500 uIU/mL   LIPID PANEL   Result Value Ref Range    Cholesterol, total 139 100 - 199 mg/dL    Triglyceride 64 0 - 149 mg/dL    HDL Cholesterol 59 >39 mg/dL    VLDL, calculated 13 5 - 40 mg/dL    LDL, calculated 67 0 - 99 mg/dL   HEMOGLOBIN A1C WITH EAG   Result Value Ref Range    Hemoglobin A1c 7.3 (H) 4.8 - 5.6 %    Estimated average glucose 163 mg/dL   CVD REPORT   Result Value Ref Range    INTERPRETATION Note    DIABETES PATIENT EDUCATION   Result Value Ref Range    PDF Image Not applicable        Health Maintenance Due   Topic Date Due   • COVID-19 Vaccine (1 of 2) Never done   • Shingrix Vaccine Age 50> (1 of 2) Never done   • GLAUCOMA SCREENING Q2Y  02/14/2017   • Eye Exam Retinal or Dilated  02/14/2017   • Medicare Yearly Exam  09/16/2020   • Foot Exam Q1  09/16/2020       No chief complaint on file.      1. Have you been to the ER, urgent care clinic since your last visit?  Hospitalized since your last visit?No    2. Have you seen or consulted any other health care providers outside of the [facility] since your last visit?  Include any pap smears or colon screening. No    3) Do you have an Advance Directive on file? no    4) Are you interested in receiving information on Advance Directives? NO      Patient is accompanied by self I have received verbal consent from Sana Mosqueda to discuss any/all medical information while they are present in the room.

## 2021-03-20 DIAGNOSIS — L25.9 CONTACT DERMATITIS, UNSPECIFIED CONTACT DERMATITIS TYPE, UNSPECIFIED TRIGGER: ICD-10-CM

## 2021-03-22 RX ORDER — TRIAMCINOLONE ACETONIDE 1 MG/G
CREAM TOPICAL
Qty: 30 G | Refills: 1 | Status: SHIPPED | OUTPATIENT
Start: 2021-03-22 | End: 2021-10-21

## 2021-03-26 DIAGNOSIS — D64.9 ANEMIA, UNSPECIFIED TYPE: Primary | ICD-10-CM

## 2021-03-26 LAB
BASOPHILS # BLD AUTO: 0 X10E3/UL (ref 0–0.2)
BASOPHILS NFR BLD AUTO: 1 %
EOSINOPHIL # BLD AUTO: 0.2 X10E3/UL (ref 0–0.4)
EOSINOPHIL NFR BLD AUTO: 4 %
ERYTHROCYTE [DISTWIDTH] IN BLOOD BY AUTOMATED COUNT: 17.3 % (ref 11.6–15.4)
FERRITIN SERPL-MCNC: 11 NG/ML (ref 30–400)
HCT VFR BLD AUTO: 35.2 % (ref 37.5–51)
HGB BLD-MCNC: 10.3 G/DL (ref 13–17.7)
IMM GRANULOCYTES # BLD AUTO: 0 X10E3/UL (ref 0–0.1)
IMM GRANULOCYTES NFR BLD AUTO: 0 %
IRON SATN MFR SERPL: 5 % (ref 15–55)
IRON SERPL-MCNC: 25 UG/DL (ref 38–169)
LYMPHOCYTES # BLD AUTO: 1.6 X10E3/UL (ref 0.7–3.1)
LYMPHOCYTES NFR BLD AUTO: 28 %
MCH RBC QN AUTO: 21.8 PG (ref 26.6–33)
MCHC RBC AUTO-ENTMCNC: 29.3 G/DL (ref 31.5–35.7)
MCV RBC AUTO: 75 FL (ref 79–97)
MONOCYTES # BLD AUTO: 0.6 X10E3/UL (ref 0.1–0.9)
MONOCYTES NFR BLD AUTO: 11 %
NEUTROPHILS # BLD AUTO: 3.3 X10E3/UL (ref 1.4–7)
NEUTROPHILS NFR BLD AUTO: 56 %
PLATELET # BLD AUTO: 303 X10E3/UL (ref 150–450)
RBC # BLD AUTO: 4.72 X10E6/UL (ref 4.14–5.8)
TIBC SERPL-MCNC: 463 UG/DL (ref 250–450)
UIBC SERPL-MCNC: 438 UG/DL (ref 111–343)
WBC # BLD AUTO: 5.7 X10E3/UL (ref 3.4–10.8)

## 2021-03-26 RX ORDER — LANOLIN ALCOHOL/MO/W.PET/CERES
325 CREAM (GRAM) TOPICAL 2 TIMES DAILY WITH MEALS
Qty: 60 TAB | Refills: 1 | Status: SHIPPED | OUTPATIENT
Start: 2021-03-26 | End: 2021-04-20

## 2021-03-26 NOTE — PROGRESS NOTES
Hemoglobin remains low with low iron level. Increase vitron-C to twice a day. Repeat CBC in 6 weeks. Notify of any s/sx of bleeding.

## 2021-03-29 ENCOUNTER — TELEPHONE (OUTPATIENT)
Dept: INTERNAL MEDICINE CLINIC | Age: 81
End: 2021-03-29

## 2021-04-01 RX ORDER — FLUTICASONE PROPIONATE 50 MCG
SPRAY, SUSPENSION (ML) NASAL
Qty: 1 BOTTLE | Refills: 1 | Status: SHIPPED | OUTPATIENT
Start: 2021-04-01 | End: 2021-08-06 | Stop reason: SDUPTHER

## 2021-04-20 RX ORDER — LANOLIN ALCOHOL/MO/W.PET/CERES
CREAM (GRAM) TOPICAL
Qty: 60 TAB | Refills: 1 | Status: SHIPPED | OUTPATIENT
Start: 2021-04-20 | End: 2021-05-12

## 2021-04-21 DIAGNOSIS — J30.1 ALLERGIC RHINITIS DUE TO POLLEN, UNSPECIFIED SEASONALITY: ICD-10-CM

## 2021-04-21 RX ORDER — LORATADINE 10 MG/1
10 TABLET ORAL
Qty: 30 TAB | Refills: 0 | Status: SHIPPED | OUTPATIENT
Start: 2021-04-21 | End: 2021-05-19

## 2021-04-21 NOTE — TELEPHONE ENCOUNTER
Pharmacy called requesting refill for   Requested Prescriptions     Pending Prescriptions Disp Refills    loratadine (Claritin) 10 mg tablet 30 Tab 0     Sig: Take 1 Tab by mouth daily as needed for Allergies.    03/03/2021  07/15/2021  [facility]

## 2021-05-03 RX ORDER — FAMOTIDINE 20 MG/1
TABLET, FILM COATED ORAL
Qty: 180 TAB | Refills: 1 | Status: SHIPPED | OUTPATIENT
Start: 2021-05-03 | End: 2021-11-03

## 2021-05-06 DIAGNOSIS — M15.9 PRIMARY OSTEOARTHRITIS INVOLVING MULTIPLE JOINTS: ICD-10-CM

## 2021-05-06 RX ORDER — DICLOFENAC SODIUM 10 MG/G
GEL TOPICAL
Qty: 300 G | Refills: 3 | Status: SHIPPED | OUTPATIENT
Start: 2021-05-06 | End: 2021-07-15 | Stop reason: SDUPTHER

## 2021-05-12 RX ORDER — LANOLIN ALCOHOL/MO/W.PET/CERES
CREAM (GRAM) TOPICAL
Qty: 60 TAB | Refills: 1 | Status: SHIPPED | OUTPATIENT
Start: 2021-05-12 | End: 2022-01-19

## 2021-05-19 DIAGNOSIS — L30.9 DERMATITIS: ICD-10-CM

## 2021-05-19 RX ORDER — BETAMETHASONE VALERATE 1.2 MG/G
OINTMENT TOPICAL
Qty: 45 G | Refills: 2 | Status: SHIPPED | OUTPATIENT
Start: 2021-05-19

## 2021-05-20 LAB
BASOPHILS # BLD AUTO: 0 X10E3/UL (ref 0–0.2)
BASOPHILS NFR BLD AUTO: 1 %
EOSINOPHIL # BLD AUTO: 0.2 X10E3/UL (ref 0–0.4)
EOSINOPHIL NFR BLD AUTO: 4 %
ERYTHROCYTE [DISTWIDTH] IN BLOOD BY AUTOMATED COUNT: 21.2 % (ref 11.6–15.4)
FERRITIN SERPL-MCNC: 28 NG/ML (ref 30–400)
HCT VFR BLD AUTO: 44.7 % (ref 37.5–51)
HGB BLD-MCNC: 14 G/DL (ref 13–17.7)
IMM GRANULOCYTES # BLD AUTO: 0 X10E3/UL (ref 0–0.1)
IMM GRANULOCYTES NFR BLD AUTO: 0 %
IRON SATN MFR SERPL: 32 % (ref 15–55)
IRON SERPL-MCNC: 125 UG/DL (ref 38–169)
LYMPHOCYTES # BLD AUTO: 1.8 X10E3/UL (ref 0.7–3.1)
LYMPHOCYTES NFR BLD AUTO: 35 %
MCH RBC QN AUTO: 26.6 PG (ref 26.6–33)
MCHC RBC AUTO-ENTMCNC: 31.3 G/DL (ref 31.5–35.7)
MCV RBC AUTO: 85 FL (ref 79–97)
MONOCYTES # BLD AUTO: 0.5 X10E3/UL (ref 0.1–0.9)
MONOCYTES NFR BLD AUTO: 10 %
NEUTROPHILS # BLD AUTO: 2.7 X10E3/UL (ref 1.4–7)
NEUTROPHILS NFR BLD AUTO: 50 %
PLATELET # BLD AUTO: 256 X10E3/UL (ref 150–450)
RBC # BLD AUTO: 5.26 X10E6/UL (ref 4.14–5.8)
TIBC SERPL-MCNC: 387 UG/DL (ref 250–450)
UIBC SERPL-MCNC: 262 UG/DL (ref 111–343)
WBC # BLD AUTO: 5.3 X10E3/UL (ref 3.4–10.8)

## 2021-05-21 NOTE — PROGRESS NOTES
Hemoglobin improved to normal range. Iron levels improving.     Continue ferrous sulfate twice a day

## 2021-06-07 RX ORDER — GLUCOSAMINE SULFATE 1500 MG
POWDER IN PACKET (EA) ORAL
Qty: 90 CAPSULE | Refills: 2 | Status: SHIPPED | OUTPATIENT
Start: 2021-06-07 | End: 2022-03-03

## 2021-06-23 DIAGNOSIS — E11.40 TYPE 2 DIABETES MELLITUS WITH DIABETIC NEUROPATHY, WITHOUT LONG-TERM CURRENT USE OF INSULIN (HCC): ICD-10-CM

## 2021-06-23 RX ORDER — GLIPIZIDE 5 MG/1
TABLET, FILM COATED, EXTENDED RELEASE ORAL
Qty: 90 TABLET | Refills: 1 | Status: SHIPPED | OUTPATIENT
Start: 2021-06-23 | End: 2021-11-18 | Stop reason: DRUGHIGH

## 2021-07-15 ENCOUNTER — OFFICE VISIT (OUTPATIENT)
Dept: INTERNAL MEDICINE CLINIC | Age: 81
End: 2021-07-15
Payer: MEDICARE

## 2021-07-15 VITALS
HEIGHT: 64 IN | HEART RATE: 86 BPM | WEIGHT: 133 LBS | BODY MASS INDEX: 22.71 KG/M2 | RESPIRATION RATE: 16 BRPM | DIASTOLIC BLOOD PRESSURE: 72 MMHG | TEMPERATURE: 98 F | OXYGEN SATURATION: 95 % | SYSTOLIC BLOOD PRESSURE: 142 MMHG

## 2021-07-15 DIAGNOSIS — Z00.00 MEDICARE ANNUAL WELLNESS VISIT, SUBSEQUENT: ICD-10-CM

## 2021-07-15 DIAGNOSIS — E11.49 OTHER DIABETIC NEUROLOGICAL COMPLICATION ASSOCIATED WITH TYPE 2 DIABETES MELLITUS (HCC): ICD-10-CM

## 2021-07-15 DIAGNOSIS — M15.9 PRIMARY OSTEOARTHRITIS INVOLVING MULTIPLE JOINTS: ICD-10-CM

## 2021-07-15 DIAGNOSIS — D50.9 IRON DEFICIENCY ANEMIA, UNSPECIFIED IRON DEFICIENCY ANEMIA TYPE: ICD-10-CM

## 2021-07-15 DIAGNOSIS — E11.40 TYPE 2 DIABETES MELLITUS WITH DIABETIC NEUROPATHY, WITHOUT LONG-TERM CURRENT USE OF INSULIN (HCC): Primary | ICD-10-CM

## 2021-07-15 DIAGNOSIS — F02.80 ALZHEIMER'S DEMENTIA WITHOUT BEHAVIORAL DISTURBANCE, UNSPECIFIED TIMING OF DEMENTIA ONSET: ICD-10-CM

## 2021-07-15 DIAGNOSIS — Z71.89 ACP (ADVANCE CARE PLANNING): ICD-10-CM

## 2021-07-15 DIAGNOSIS — E78.00 PURE HYPERCHOLESTEROLEMIA: ICD-10-CM

## 2021-07-15 DIAGNOSIS — I10 ESSENTIAL HYPERTENSION: ICD-10-CM

## 2021-07-15 DIAGNOSIS — G30.9 ALZHEIMER'S DEMENTIA WITHOUT BEHAVIORAL DISTURBANCE, UNSPECIFIED TIMING OF DEMENTIA ONSET: ICD-10-CM

## 2021-07-15 PROCEDURE — 99497 ADVNCD CARE PLAN 30 MIN: CPT | Performed by: INTERNAL MEDICINE

## 2021-07-15 PROCEDURE — G0439 PPPS, SUBSEQ VISIT: HCPCS | Performed by: INTERNAL MEDICINE

## 2021-07-15 PROCEDURE — G8510 SCR DEP NEG, NO PLAN REQD: HCPCS | Performed by: INTERNAL MEDICINE

## 2021-07-15 PROCEDURE — G8536 NO DOC ELDER MAL SCRN: HCPCS | Performed by: INTERNAL MEDICINE

## 2021-07-15 PROCEDURE — G8427 DOCREV CUR MEDS BY ELIG CLIN: HCPCS | Performed by: INTERNAL MEDICINE

## 2021-07-15 PROCEDURE — 1101F PT FALLS ASSESS-DOCD LE1/YR: CPT | Performed by: INTERNAL MEDICINE

## 2021-07-15 PROCEDURE — G0463 HOSPITAL OUTPT CLINIC VISIT: HCPCS | Performed by: INTERNAL MEDICINE

## 2021-07-15 PROCEDURE — G8420 CALC BMI NORM PARAMETERS: HCPCS | Performed by: INTERNAL MEDICINE

## 2021-07-15 PROCEDURE — 99214 OFFICE O/P EST MOD 30 MIN: CPT | Performed by: INTERNAL MEDICINE

## 2021-07-15 PROCEDURE — G8753 SYS BP > OR = 140: HCPCS | Performed by: INTERNAL MEDICINE

## 2021-07-15 PROCEDURE — G8754 DIAS BP LESS 90: HCPCS | Performed by: INTERNAL MEDICINE

## 2021-07-15 RX ORDER — LISINOPRIL 10 MG/1
TABLET ORAL
Qty: 90 TABLET | Refills: 1 | Status: SHIPPED | OUTPATIENT
Start: 2021-07-15

## 2021-07-15 RX ORDER — DICLOFENAC SODIUM 10 MG/G
GEL TOPICAL
Qty: 300 G | Refills: 3 | Status: SHIPPED | OUTPATIENT
Start: 2021-07-15 | End: 2022-02-01

## 2021-07-15 RX ORDER — LINAGLIPTIN AND METFORMIN HYDROCHLORIDE 2.5; 1 MG/1; MG/1
TABLET, FILM COATED ORAL
Qty: 180 TABLET | Refills: 1 | Status: SHIPPED | OUTPATIENT
Start: 2021-07-15 | End: 2022-02-08

## 2021-07-15 RX ORDER — GABAPENTIN 100 MG/1
CAPSULE ORAL
Qty: 60 CAPSULE | Refills: 5 | Status: SHIPPED | OUTPATIENT
Start: 2021-07-15 | End: 2021-10-21

## 2021-07-15 RX ORDER — DONEPEZIL HYDROCHLORIDE 5 MG/1
TABLET, FILM COATED ORAL
Qty: 90 TABLET | Refills: 1 | Status: SHIPPED | OUTPATIENT
Start: 2021-07-15

## 2021-07-15 RX ORDER — PRAVASTATIN SODIUM 20 MG/1
TABLET ORAL
Qty: 90 TABLET | Refills: 1 | Status: SHIPPED | OUTPATIENT
Start: 2021-07-15 | End: 2022-01-12

## 2021-07-15 NOTE — PROGRESS NOTES
This is the Subsequent Medicare Annual Wellness Exam, performed 12 months or more after the Initial AWV or the last Subsequent AWV    I have reviewed the patient's medical history in detail and updated the computerized patient record. Assessment/Plan   Education and counseling provided:  Are appropriate based on today's review and evaluation  End-of-Life planning (with patient's consent)  Prostate cancer screening tests (PSA, covered annually)  Screening for glaucoma    1. Type 2 diabetes mellitus with diabetic neuropathy, without long-term current use of insulin (HCC)  -     linagliptin-metFORMIN (Jentadueto) 2.5-1,000 mg per tablet; TAKE 1 TABLET BY MOUTH TWICE A DAY WITH MEALS, Normal, Disp-180 Tablet, R-1  -     METABOLIC PANEL, COMPREHENSIVE  -     HEMOGLOBIN A1C WITH EAG  -     MICROALBUMIN, UR, RAND W/ MICROALB/CREAT RATIO  2. Alzheimer's dementia without behavioral disturbance, unspecified timing of dementia onset ([facility]  -     donepeziL (ARICEPT) 5 mg tablet; TAKE 1 TABLET BY MOUTH EVERY DAY AT NIGHT, Normal, Disp-90 Tablet, R-1  3. Pure hypercholesterolemia  -     pravastatin (PRAVACHOL) 20 mg tablet; TAKE 1 TAB BY MOUTH NIGHTLY. STOP SIMVASTATIN, Normal, Disp-90 Tablet, R-1  -     METABOLIC PANEL, COMPREHENSIVE  4. Essential hypertension  -     lisinopriL (PRINIVIL, ZESTRIL) 10 mg tablet; TAKE 1 TABLET BY MOUTH EVERY DAY, Normal, Disp-90 Tablet, R-1  -     CBC WITH AUTOMATED DIFF  -     METABOLIC PANEL, COMPREHENSIVE  5. Other diabetic neurological complication associated with type 2 diabetes mellitus (HCC)  -     gabapentin (NEURONTIN) 100 mg capsule; TAKE 1 CAPSULE BY MOUTH TWICE A DAY, Normal, Disp-60 Capsule, R-5  6.  Iron deficiency anemia, unspecified iron deficiency anemia type  -     FERRITIN  -     IRON       Depression Risk Factor Screening     3 most recent PHQ Screens 7/15/2021   Little interest or pleasure in doing things Not at all   Feeling down, depressed, irritable, or hopeless Not at all   Total Score PHQ 2 0       Alcohol Risk Screen    Do you average more than 1 drink per night or more than 7 drinks a week: No    In the past three months have you have had more than 4 drinks containing alcohol on one occasion: No        Functional Ability and Level of Safety    Hearing: Hearing is good. Activities of Daily Living: The home contains: cane  Patient needs help with:  phone, transportation, shopping, preparing meals, laundry, housework and managing money      Ambulation: with mild difficulty     Fall Risk:  Fall Risk Assessment, last 12 mths 7/15/2021   Able to walk? Yes   Fall in past 12 months? 0   Do you feel unsteady? 0   Are you worried about falling 0   Number of falls in past 12 months -   Fall with injury?  -      Abuse Screen:  Patient is not abused       Cognitive Screening    Has your family/caregiver stated any concerns about your memory: yes - demented     Cognitive Screening: Normal - MMSE (Mini Mental Status Exam)    Health Maintenance Due     Health Maintenance Due   Topic Date Due    COVID-19 Vaccine (1) Never done    Shingrix Vaccine Age 50> (1 of 2) Never done    Eye Exam Retinal or Dilated  02/14/2017       Patient Care Team   Patient Care Team:  Savana Torres MD as PCP - General (Internal Medicine)  Savana Torres [facility]    History     Patient Active Problem List   Diagnosis Code    Hypertension I10    Hyperlipidemia E78.5    DJD (degenerative joint disease) M19.90    Vitamin D deficiency E55.9    Type II or unspecified type diabetes mellitus without mention of complication, not stated as uncontrolled E11.9    Type 2 diabetes mellitus without complication ([address] K23.6    Type 2 diabetes mellitus with diabetic neuropathy ([address] E11.40     Past Medical History:   Diagnosis Date    Chronic kidney disease     DJD (degenerative joint disease)     DM (diabetes mellitus) ([address]     type ll    HTN (hypertension)     Other and unspecified hyperlipidemia       History reviewed. No pertinent surgical history. Current Outpatient Medications   Medication Sig Dispense Refill    donepeziL (ARICEPT) 5 mg tablet TAKE 1 TABLET BY MOUTH EVERY DAY AT NIGHT 90 Tablet 1    linagliptin-metFORMIN (Jentadueto) 2.5-1,000 mg per tablet TAKE 1 TABLET BY MOUTH TWICE A DAY WITH MEALS 180 Tablet 1    lisinopriL (PRINIVIL, ZESTRIL) 10 mg tablet TAKE 1 TABLET BY MOUTH EVERY DAY 90 Tablet 1    pravastatin (PRAVACHOL) 20 mg tablet TAKE 1 TAB BY MOUTH NIGHTLY. STOP SIMVASTATIN 90 Tablet 1    gabapentin (NEURONTIN) 100 mg capsule TAKE 1 CAPSULE BY MOUTH TWICE A DAY 60 Capsule 5    glipiZIDE SR (GLUCOTROL XL) 5 mg CR tablet TAKE 1 TABLET BY MOUTH EVERY DAY 90 Tablet 1    cholecalciferol (VITAMIN D3) 25 mcg (1,000 unit) cap TAKE 1 CAPSULE BY MOUTH EVERY DAY 90 Capsule 2    loratadine (CLARITIN) 10 mg tablet TAKE 1 TAB BY MOUTH DAILY AS NEEDED FOR ALLERGIES. 30 Tablet 4    betamethasone valerate (VALISONE) 0.1 % ointment APPLY TO AFFECTED AREA DAILY. D/C TRIAMCINOLONE 45 g 2    ferrous sulfate 325 mg (65 mg iron) tablet TAKE 1 TABLET BY MOUTH TWICE A DAY WITH MEALS 60 Tab 1    diclofenac (VOLTAREN) 1 % gel USE AS DIRECTED TWICE A  g 3    famotidine (PEPCID) 20 mg tablet TAKE 1 TABLET BY MOUTH TWICE A  Tab 1    OneTouch Ultra Blue Test Strip strip USE TO TEST BLOOD SUGAR ONCE DAILY. *FILL ON 10/21* 100 Strip 1    fluticasone propionate (FLONASE) 50 mcg/actuation nasal spray USE 1 SPRAY INTO EACH NOSTRIL DAILY 1 Bottle 1    triamcinolone acetonide (KENALOG) 0.1 % topical cream APPLY THIN LAYER TO AFFECTED AREA TWICE DAILY 30 g 1    meloxicam (MOBIC) 15 mg tablet Take 1 Tab by mouth daily. 90 Tab 1    tamsulosin (FLOMAX) 0.4 mg capsule TAKE 1 CAPSULE BY MOUTH EVERY DAY 90 Cap 1    aspirin 81 mg chewable tablet TAKE 1 TABLET BY MOUTH EVERY OTHER DAY 45 Tab 1    lancets misc Use to test blood sugars once daily.  DX: E11.9 Type 2 diabetes mellitus without complication. 100 Each 1    Blood-Glucose Meter monitoring kit Use to test blood sugars once daily. DX: E11.9 Type 2 diabetes mellitus without complication. 1 Kit 0    Blood-Glucose Meter monitoring kit Use to monitor blood sugar BID for DX: E11.9 Type 2 diabetes mellitus without complication 1 Kit 0    glucose blood VI test strips (ASCENSIA AUTODISC VI, ONE TOUCH ULTRA TEST VI) strip Use to monitor blood sugar BID for DX: E11.9 Type 2 diabetes mellitus without complication 654 Strip 11    lancets misc Use to monitor blood sugar BID for DX: E11.9 Type 2 diabetes mellitus without complication 024 Each 11    ondansetron (ZOFRAN ODT) 4 mg disintegrating tablet Take 1 Tab by mouth daily as needed for Nausea. 30 Tab 0    hydrOXYzine HCl (ATARAX) 10 mg tablet TAKE 1-2 TABLETS BY MOUTH AT BEDTIME AS NEEDED ITCHING  1    SENNA PLUS 8.6-50 mg per tablet TAKE 1 TABLET BY MOUTH EVERY DAY 90 Tab 1    Walker (ULTRA-LIGHT ROLLATOR) misc Use it everyday 1 Each 0    acetaminophen (TYLENOL) 500 mg tablet Take 1 Tab by mouth two (2) times daily as needed. 60 Tab 12    peg 400-propylene glycol (SYSTANE) 0.4-0.3 % Drop Administer 1 Drop to left eye as needed.        Allergies   Allergen Reactions    Amoxicillin Nausea and Vomiting    Hydrocod-Cpm-Pe-Acetaminophen Other (comments)     GI Upset    Seafood [Shellfish Containing Products] Rash       Family History   Problem Relation Age of Onset    Hypertension Mother          at 80 yo   [employer] Cancer Mother     Hypertension Father          at 80    Diabetes Sister    [employer] Cancer Son         colon cancer    Diabetes Brother         15 siblings, most with DM    Diabetes Son     No Known Problems Son     No Known Problems Son     No Known Problems Son     No Known Problems Son     No Known Problems Son     No Known Problems Daughter      Social History     Tobacco Use    Smoking status: Former Smoker     Packs/day: 2.00     Years: 20.00 Pack years: 40.00     Types: Cigarettes     Quit date: 1982     Years since quittin.5    Smokeless tobacco: Never Used   Substance Use Topics    Alcohol use: No         Lorena Kelley MD

## 2021-07-15 NOTE — PROGRESS NOTES
HISTORY OF PRESENT ILLNESS  Cyn Koo is a 79 y.o. male here for follow-up. She is accompanied by her son. He has lost approximately 30 pounds weight over last 1 and half year. He has dementia. According to son he is eating well. Which I doubt. He is not able to remember to eat. Has diabetes, compliant with medication. Need foot exam.  Eye checkup up-to-date. Has hypertension, compliant with medicine. Denies chest pain palpitation or shortness of breath. Has some multiple joint pain. Would like to get refill on diclofenac gel. Here for Medicare wellness visit. Has no living will. HPI    Review of Systems   Constitutional: Positive for weight loss. HENT: Negative. Eyes: Negative. Respiratory: Negative. Cardiovascular: Negative. Gastrointestinal: Negative. Genitourinary: Negative. Musculoskeletal: Positive for joint pain. Skin: Negative. Neurological: Negative. Endo/Heme/Allergies: Negative. Psychiatric/Behavioral: Positive for memory loss. Physical Exam  Constitutional:       Appearance: Normal appearance. He is normal weight. Cardiovascular:      Rate and Rhythm: Normal rate and regular rhythm. Pulses: Normal pulses. Heart sounds: Normal heart sounds. Pulmonary:      Effort: Pulmonary effort is normal.      Breath sounds: Normal breath sounds. Abdominal:      General: Abdomen is flat. Bowel sounds are normal.      Palpations: Abdomen is soft. Musculoskeletal:         General: Normal range of motion. Cervical back: Normal range of motion and neck supple. Comments: Diabetic foot exam:     Left Foot:   Visual Exam: normal    Pulse DP: 2+ (normal)   Filament test: reduced sensation    Vibratory sensation: diminished      Right Foot:   Visual Exam: normal    Pulse DP: 2+ (normal)   Filament test: reduced sensation    Vibratory sensation: normal     Neurological:      General: No focal deficit present.       Mental Status: He is alert. Mental status is at baseline. Psychiatric:         Mood and Affect: Mood normal.         Behavior: Behavior normal.         Thought Content: Thought content normal.      Comments: Demented. ASSESSMENT and PLAN  Diagnoses and all orders for this visit:    1. Type 2 diabetes mellitus with diabetic neuropathy, without long-term current use of insulin (HCC)    Stable diabetes. Will refill,  -     linagliptin-metFORMIN (Jentadueto) 2.5-1,000 mg per tablet; TAKE 1 TABLET BY MOUTH TWICE A DAY WITH MEALS  -     METABOLIC PANEL, COMPREHENSIVE  -     HEMOGLOBIN A1C WITH EAG  -     MICROALBUMIN, UR, RAND W/ MICROALB/CREAT RATIO    2. Alzheimer's dementia without behavioral disturbance, unspecified timing of dementia onset ([facility]  Doing well. Will refill,    -     donepeziL (ARICEPT) 5 mg tablet; TAKE 1 TABLET BY MOUTH EVERY DAY AT NIGHT    3. Pure hypercholesterolemia    We will refill,  -     pravastatin (PRAVACHOL) 20 mg tablet; TAKE 1 TAB BY MOUTH NIGHTLY. STOP SIMVASTATIN  -     METABOLIC PANEL, COMPREHENSIVE    4. Essential hypertension    Stable blood pressure. Will refill,  -     lisinopriL (PRINIVIL, ZESTRIL) 10 mg tablet; TAKE 1 TABLET BY MOUTH EVERY DAY  -     CBC WITH AUTOMATED DIFF  -     METABOLIC PANEL, COMPREHENSIVE    5. Other diabetic neurological complication associated with type 2 diabetes mellitus ([facility] [address]    We will give,  -     gabapentin (NEURONTIN) 100 mg capsule; TAKE 1 CAPSULE BY MOUTH TWICE A DAY    6. Iron deficiency anemia, unspecified iron deficiency anemia type    We will check,  -     FERRITIN  -     IRON    7. Medicare annual wellness visit, subsequent    8. ACP (advance care planning)    9.  Primary osteoarthritis involving multiple joints    We will refill,  -     diclofenac (VOLTAREN) 1 % gel; Use top BID    Issues reviewed with her: referral to Diabetic Education department, diabetic diet discussed in detail, written exchange diet given, home glucose monitoring emphasized, all medications, side effects and compliance discussed carefully, foot care discussed and Podiatry visits discussed, annual eye examinations at Ophthalmology discussed, long term diabetic complications discussed and labs immediately prior to next visit.

## 2021-07-15 NOTE — ACP (ADVANCE CARE PLANNING)

## 2021-07-15 NOTE — PATIENT INSTRUCTIONS
Medicare Wellness Visit, Male    The best way to live healthy is to have a lifestyle where you eat a well-balanced diet, exercise regularly, limit alcohol use, and quit all forms of tobacco/nicotine, if applicable. Regular preventive services are another way to keep healthy. Preventive services (vaccines, screening tests, monitoring & exams) can help personalize your care plan, which helps you manage your own care. Screening tests can find health problems at the earliest stages, when they are easiest to treat. [facility] follows the current, evidence-based guidelines published by the [facility] Neymar Kim ([facility]) when recommending preventive services for our patients. Because we follow these guidelines, sometimes recommendations change over time as research supports it. (For example, a prostate screening blood test is no longer routinely recommended for men with no symptoms). Of course, you and your doctor may decide to screen more often for some diseases, based on your risk and co-morbidities (chronic disease you are already diagnosed with). Preventive services for you include:  - Medicare offers their members a free annual wellness visit, which is time for you and your primary care provider to discuss and plan for your preventive service needs. Take advantage of this benefit every year!  -All adults over age 72 should receive the recommended pneumonia vaccines. Current USPSTF guidelines recommend a series of two vaccines for the best pneumonia protection.   -All adults should have a flu vaccine yearly and tetanus vaccine every 10 years.  -All adults age 48 and older should receive the shingles vaccines (series of two vaccines).        -All adults age 38-68 who are overweight should have a diabetes screening test once every three years.   -Other screening tests & preventive services for persons with diabetes include: an eye exam to screen for diabetic retinopathy, a kidney function test, a foot exam, and stricter control over your cholesterol.   -Cardiovascular screening for adults with routine risk involves an electrocardiogram (ECG) at intervals determined by the provider.   -Colorectal cancer screening should be done for adults age 54-65 with no increased risk factors for colorectal cancer. There are a number of acceptable methods of screening for this type of cancer. Each test has its own benefits and drawbacks. Discuss with your provider what is most appropriate for you during your annual wellness visit. The different tests include: colonoscopy (considered the best screening method), a fecal occult blood test, a fecal DNA test, and sigmoidoscopy.  -All adults born between [facility] should be screened once for Hepatitis C.  -An Abdominal Aortic Aneurysm (AAA) Screening is recommended for men age 73-68 who has ever smoked in their lifetime.      Here is a list of your current Health Maintenance items (your personalized list of preventive services) with a due date:  Health Maintenance Due   Topic Date Due    COVID-19 Vaccine (1) Never done    Shingles Vaccine (1 of 2) Never done    Eye Exam  02/14/2017

## 2021-07-15 NOTE — PROGRESS NOTES
Health Maintenance Due   Topic Date Due    COVID-19 Vaccine (1) Never done    Shingrix Vaccine Age 50> (1 of 2) Never done    Eye Exam Retinal or Dilated  02/14/2017    Medicare Yearly Exam  09/16/2020    Foot Exam Q1  09/16/2020       Chief Complaint   Patient presents with    Hypertension    Diabetes    Leg Pain    Medication Refill       1. Have you been to the ER, urgent care clinic since your last visit? Hospitalized since your last visit? No    2. Have you seen or consulted any other health care providers outside of the [address] [place] [address] since your last visit? Include any pap smears or colon screening. No    3) Do you have an Advance Directive on file? no    4) Are you interested in receiving information on Advance Directives? NO      Patient is accompanied by son I have received verbal consent [facility] to discuss any/all medical information while they are present in the room.

## 2021-07-16 LAB
ALBUMIN SERPL-MCNC: 4.2 G/DL (ref 3.6–4.6)
ALBUMIN/CREAT UR: <9 MG/G CREAT (ref 0–29)
ALBUMIN/GLOB SERPL: 1.4 {RATIO} (ref 1.2–2.2)
ALP SERPL-CCNC: 66 IU/L (ref 48–121)
ALT SERPL-CCNC: 17 IU/L (ref 0–44)
AST SERPL-CCNC: 18 IU/L (ref 0–40)
BASOPHILS # BLD AUTO: 0 X10E3/UL (ref 0–0.2)
BASOPHILS NFR BLD AUTO: 1 %
BILIRUB SERPL-MCNC: 0.2 MG/DL (ref 0–1.2)
BUN SERPL-MCNC: 16 MG/DL (ref 8–27)
BUN/CREAT SERPL: 20 (ref 10–24)
CALCIUM SERPL-MCNC: 9.5 MG/DL (ref 8.6–10.2)
CHLORIDE SERPL-SCNC: 104 MMOL/L (ref 96–106)
CO2 SERPL-SCNC: 27 MMOL/L (ref 20–29)
CREAT SERPL-MCNC: 0.8 MG/DL (ref 0.76–1.27)
CREAT UR-MCNC: 31.8 MG/DL
EOSINOPHIL # BLD AUTO: 0.3 X10E3/UL (ref 0–0.4)
EOSINOPHIL NFR BLD AUTO: 5 %
ERYTHROCYTE [DISTWIDTH] IN BLOOD BY AUTOMATED COUNT: 15.6 % (ref 11.6–15.4)
EST. AVERAGE GLUCOSE BLD GHB EST-MCNC: 169 MG/DL
FERRITIN SERPL-MCNC: 19 NG/ML (ref 30–400)
GLOBULIN SER CALC-MCNC: 2.9 G/DL (ref 1.5–4.5)
GLUCOSE SERPL-MCNC: 109 MG/DL (ref 65–99)
HBA1C MFR BLD: 7.5 % (ref 4.8–5.6)
HCT VFR BLD AUTO: 44 % (ref 37.5–51)
HGB BLD-MCNC: 14.5 G/DL (ref 13–17.7)
IMM GRANULOCYTES # BLD AUTO: 0 X10E3/UL (ref 0–0.1)
IMM GRANULOCYTES NFR BLD AUTO: 0 %
IRON SERPL-MCNC: 77 UG/DL (ref 38–169)
LYMPHOCYTES # BLD AUTO: 1.9 X10E3/UL (ref 0.7–3.1)
LYMPHOCYTES NFR BLD AUTO: 32 %
MCH RBC QN AUTO: 27.9 PG (ref 26.6–33)
MCHC RBC AUTO-ENTMCNC: 33 G/DL (ref 31.5–35.7)
MCV RBC AUTO: 85 FL (ref 79–97)
MICROALBUMIN UR-MCNC: <3 UG/ML
MONOCYTES # BLD AUTO: 0.6 X10E3/UL (ref 0.1–0.9)
MONOCYTES NFR BLD AUTO: 10 %
NEUTROPHILS # BLD AUTO: 3.2 X10E3/UL (ref 1.4–7)
NEUTROPHILS NFR BLD AUTO: 52 %
PLATELET # BLD AUTO: 263 X10E3/UL (ref 150–450)
POTASSIUM SERPL-SCNC: 4.1 MMOL/L (ref 3.5–5.2)
PROT SERPL-MCNC: 7.1 G/DL (ref 6–8.5)
RBC # BLD AUTO: 5.2 X10E6/UL (ref 4.14–5.8)
SODIUM SERPL-SCNC: 144 MMOL/L (ref 134–144)
WBC # BLD AUTO: 6.1 X10E3/UL (ref 3.4–10.8)

## 2021-07-21 NOTE — PROGRESS NOTES
HgbA1c 7.5. Watch diet for sugars and carbohydrates. Ferritin level low, 19. Hgb within normal range. May continue iron supplement twice a day.

## 2021-07-29 ENCOUNTER — TELEPHONE (OUTPATIENT)
Dept: INTERNAL MEDICINE CLINIC | Age: 81
End: 2021-07-29

## 2021-07-29 DIAGNOSIS — E11.9 TYPE 2 DIABETES MELLITUS WITHOUT COMPLICATION, WITHOUT LONG-TERM CURRENT USE OF INSULIN (HCC): ICD-10-CM

## 2021-07-29 NOTE — TELEPHONE ENCOUNTER
Pt completely out of test trips for about 2 weeks now.     Pt stated that the pharmacy is requesting a prior authorization for OneTouch Ultra Blue Test Strips

## 2021-07-31 DIAGNOSIS — N40.1 HYPERPLASIA OF PROSTATE WITH URINARY RETENTION: Primary | ICD-10-CM

## 2021-07-31 DIAGNOSIS — R33.8 HYPERPLASIA OF PROSTATE WITH URINARY RETENTION: Primary | ICD-10-CM

## 2021-07-31 DIAGNOSIS — E11.9 TYPE 2 DIABETES MELLITUS WITHOUT COMPLICATION, WITHOUT LONG-TERM CURRENT USE OF INSULIN (HCC): ICD-10-CM

## 2021-08-02 RX ORDER — TAMSULOSIN HYDROCHLORIDE 0.4 MG/1
CAPSULE ORAL
Qty: 90 CAPSULE | Refills: 1 | Status: SHIPPED | OUTPATIENT
Start: 2021-08-02 | End: 2022-01-31

## 2021-08-06 DIAGNOSIS — J30.1 ALLERGIC RHINITIS DUE TO POLLEN, UNSPECIFIED SEASONALITY: Primary | ICD-10-CM

## 2021-08-06 RX ORDER — FLUTICASONE PROPIONATE 50 MCG
SPRAY, SUSPENSION (ML) NASAL
Qty: 1 BOTTLE | Refills: 5 | Status: SHIPPED | OUTPATIENT
Start: 2021-08-06

## 2021-08-30 DIAGNOSIS — M47.22 OSTEOARTHRITIS OF SPINE WITH RADICULOPATHY, CERVICAL REGION: ICD-10-CM

## 2021-08-30 RX ORDER — MELOXICAM 15 MG/1
TABLET ORAL
Qty: 90 TABLET | Refills: 1 | Status: SHIPPED | OUTPATIENT
Start: 2021-08-30 | End: 2022-01-24

## 2021-10-15 DIAGNOSIS — J30.1 ALLERGIC RHINITIS DUE TO POLLEN, UNSPECIFIED SEASONALITY: ICD-10-CM

## 2021-10-15 RX ORDER — LORATADINE 10 MG/1
10 TABLET ORAL
Qty: 90 TABLET | Refills: 1 | Status: SHIPPED | OUTPATIENT
Start: 2021-10-15

## 2021-10-21 DIAGNOSIS — E11.49 OTHER DIABETIC NEUROLOGICAL COMPLICATION ASSOCIATED WITH TYPE 2 DIABETES MELLITUS (HCC): ICD-10-CM

## 2021-10-21 DIAGNOSIS — L25.9 CONTACT DERMATITIS, UNSPECIFIED CONTACT DERMATITIS TYPE, UNSPECIFIED TRIGGER: ICD-10-CM

## 2021-10-21 RX ORDER — GABAPENTIN 100 MG/1
CAPSULE ORAL
Qty: 60 CAPSULE | Refills: 1 | Status: SHIPPED | OUTPATIENT
Start: 2021-10-21 | End: 2022-01-24

## 2021-10-21 RX ORDER — TRIAMCINOLONE ACETONIDE 1 MG/G
CREAM TOPICAL
Qty: 30 G | Refills: 1 | Status: SHIPPED | OUTPATIENT
Start: 2021-10-21

## 2021-11-03 RX ORDER — FAMOTIDINE 20 MG/1
TABLET, FILM COATED ORAL
Qty: 180 TABLET | Refills: 1 | Status: SHIPPED | OUTPATIENT
Start: 2021-11-03 | End: 2022-03-02

## 2021-11-18 ENCOUNTER — OFFICE VISIT (OUTPATIENT)
Dept: INTERNAL MEDICINE CLINIC | Age: 81
End: 2021-11-18
Payer: MEDICARE

## 2021-11-18 VITALS
HEIGHT: 64 IN | DIASTOLIC BLOOD PRESSURE: 80 MMHG | TEMPERATURE: 98 F | RESPIRATION RATE: 18 BRPM | WEIGHT: 134 LBS | OXYGEN SATURATION: 97 % | HEART RATE: 98 BPM | SYSTOLIC BLOOD PRESSURE: 140 MMHG | BODY MASS INDEX: 22.88 KG/M2

## 2021-11-18 DIAGNOSIS — I10 ESSENTIAL HYPERTENSION: ICD-10-CM

## 2021-11-18 DIAGNOSIS — E16.2 LOW BLOOD SUGAR: Primary | ICD-10-CM

## 2021-11-18 DIAGNOSIS — E61.1 IRON DEFICIENCY: ICD-10-CM

## 2021-11-18 DIAGNOSIS — R60.0 BILATERAL LEG EDEMA: ICD-10-CM

## 2021-11-18 DIAGNOSIS — E78.00 PURE HYPERCHOLESTEROLEMIA: ICD-10-CM

## 2021-11-18 DIAGNOSIS — E11.9 TYPE 2 DIABETES MELLITUS WITHOUT COMPLICATION, WITHOUT LONG-TERM CURRENT USE OF INSULIN (HCC): ICD-10-CM

## 2021-11-18 PROBLEM — E11.22 TYPE 2 DIABETES MELLITUS WITH CHRONIC KIDNEY DISEASE (HCC): Status: ACTIVE | Noted: 2021-11-18

## 2021-11-18 PROCEDURE — G8536 NO DOC ELDER MAL SCRN: HCPCS | Performed by: INTERNAL MEDICINE

## 2021-11-18 PROCEDURE — 99214 OFFICE O/P EST MOD 30 MIN: CPT | Performed by: INTERNAL MEDICINE

## 2021-11-18 PROCEDURE — G8420 CALC BMI NORM PARAMETERS: HCPCS | Performed by: INTERNAL MEDICINE

## 2021-11-18 PROCEDURE — G8753 SYS BP > OR = 140: HCPCS | Performed by: INTERNAL MEDICINE

## 2021-11-18 PROCEDURE — G0463 HOSPITAL OUTPT CLINIC VISIT: HCPCS | Performed by: INTERNAL MEDICINE

## 2021-11-18 PROCEDURE — 1101F PT FALLS ASSESS-DOCD LE1/YR: CPT | Performed by: INTERNAL MEDICINE

## 2021-11-18 PROCEDURE — G8427 DOCREV CUR MEDS BY ELIG CLIN: HCPCS | Performed by: INTERNAL MEDICINE

## 2021-11-18 PROCEDURE — G8754 DIAS BP LESS 90: HCPCS | Performed by: INTERNAL MEDICINE

## 2021-11-18 PROCEDURE — G8510 SCR DEP NEG, NO PLAN REQD: HCPCS | Performed by: INTERNAL MEDICINE

## 2021-11-18 RX ORDER — GLIPIZIDE 2.5 MG/1
2.5 TABLET, EXTENDED RELEASE ORAL DAILY
Qty: 30 TABLET | Refills: 2 | Status: SHIPPED | OUTPATIENT
Start: 2021-11-18 | End: 2022-02-24

## 2021-11-18 NOTE — PROGRESS NOTES
HISTORY OF PRESENT ILLNESS  Edmundo Marks is a 79 y.o. male here for follow-up. She is accompanied by her son. Noticed to have bilateral leg swelling, sometimes he  believe he has hand swelling too. No shortness of breath or orthopnea. Has diabetes, compliant with medication. Need foot exam.  Eye checkup up-to-date. Blood sugar is dropping too low. Some days fasting blood sugar is 65 or 70. Rarely it comes up to 100. Taking glipizide XL 5 mg a day. Has hypertension, compliant with medicine. Denies chest pain palpitation or shortness of breath. He looks frail. Eating okay but not able to gain much weight. Memory is stable, has dementia. Taking Aricept. Has DJD multiple joint, taking medication. Leg Pain     Leg Swelling    Diabetes        Review of Systems   HENT: Negative. Eyes: Negative. Respiratory: Negative. Cardiovascular: Positive for leg swelling. Gastrointestinal: Negative. Genitourinary: Negative. Musculoskeletal: Positive for joint pain. Skin: Negative. Neurological: Negative. Endo/Heme/Allergies: Negative. Psychiatric/Behavioral: Positive for memory loss. Physical Exam  Constitutional:       Appearance: Normal appearance. He is normal weight. Cardiovascular:      Rate and Rhythm: Normal rate and regular rhythm. Pulses: Normal pulses. Heart sounds: Normal heart sounds. Pulmonary:      Effort: Pulmonary effort is normal.      Breath sounds: Normal breath sounds. Abdominal:      General: Abdomen is flat. Bowel sounds are normal.      Palpations: Abdomen is soft. Musculoskeletal:         General: Normal range of motion. Cervical back: Normal range of motion and neck supple. Comments: Bilateral trace leg edema present. Neurological:      General: No focal deficit present. Mental Status: He is alert. Mental status is at baseline.    Psychiatric:         Mood and Affect: Mood normal.         Behavior: Behavior normal. Thought Content: Thought content normal.      Comments: Demented. ASSESSMENT and PLAN  Diagnoses and all orders for this visit:    1. Low blood sugar    We will reduce,  -     glipiZIDE SR (Glucotrol XL) 2.5 mg CR tablet; Take 1 Tablet by mouth daily. DC glucotrol Xl 5 mg    2. Type 2 diabetes mellitus without complication, without long-term current use of insulin ([address]  On multiple medication. On Jentadueto and glipizide XL. Will reduce glipizide dosage. Will check,    -     METABOLIC PANEL, COMPREHENSIVE  -     HEMOGLOBIN A1C WITH EAG    3. Essential hypertension  Stable blood pressure. On lisinopril. 4. Pure hypercholesterolemia  Stable. On Pravachol. 5. Iron deficiency    Taking iron supplement, making him constipated. We will  recheck,  -     FERRITIN  -     IRON    6. Bilateral leg edema    No medication. Advised to do leg elevation and watch salt. Will check,  -     METABOLIC PANEL, COMPREHENSIVE  Discussed expected course/resolution/complications of diagnosis in detail with patient. Medication risks/benefits/costs/interactions/alternatives discussed with patient. Discussed COVID-19 infection precaution with patient. Pt was given an after visit summary which includes diagnoses, current medications & vitals. Pt expressed understanding with the diagnosis and plan.

## 2021-11-18 NOTE — PROGRESS NOTES
Health Maintenance Due   Topic Date Due    COVID-19 Vaccine (1) Never done    Shingrix Vaccine Age 50> (1 of 2) Never done    Eye Exam Retinal or Dilated  02/14/2017       Chief Complaint   Patient presents with    Leg Pain    Leg Swelling    Hand Swelling    Diabetes       1. Have you been to the ER, urgent care clinic since your last visit? Hospitalized since your last visit? No    2. Have you seen or consulted any other health care providers outside of the [address] [place] [address] since your last visit? Include any pap smears or colon screening. No    3) Do you have an Advance Directive on file? no    4) Are you interested in receiving information on Advance Directives? NO      Patient is accompanied by self I have received verbal consent from [address] [place] [address] to discuss any/all medical information while they are present in the room.

## 2021-11-19 LAB
ALBUMIN SERPL-MCNC: 4.3 G/DL (ref 3.6–4.6)
ALBUMIN/GLOB SERPL: 1.7 {RATIO} (ref 1.2–2.2)
ALP SERPL-CCNC: 63 IU/L (ref 44–121)
ALT SERPL-CCNC: 19 IU/L (ref 0–44)
AST SERPL-CCNC: 21 IU/L (ref 0–40)
BILIRUB SERPL-MCNC: 0.2 MG/DL (ref 0–1.2)
BUN SERPL-MCNC: 17 MG/DL (ref 8–27)
BUN/CREAT SERPL: 19 (ref 10–24)
CALCIUM SERPL-MCNC: 9.2 MG/DL (ref 8.6–10.2)
CHLORIDE SERPL-SCNC: 104 MMOL/L (ref 96–106)
CO2 SERPL-SCNC: 26 MMOL/L (ref 20–29)
CREAT SERPL-MCNC: 0.88 MG/DL (ref 0.76–1.27)
EST. AVERAGE GLUCOSE BLD GHB EST-MCNC: 163 MG/DL
FERRITIN SERPL-MCNC: 18 NG/ML (ref 30–400)
GLOBULIN SER CALC-MCNC: 2.5 G/DL (ref 1.5–4.5)
GLUCOSE SERPL-MCNC: 169 MG/DL (ref 65–99)
HBA1C MFR BLD: 7.3 % (ref 4.8–5.6)
IRON SERPL-MCNC: 56 UG/DL (ref 38–169)
POTASSIUM SERPL-SCNC: 4.1 MMOL/L (ref 3.5–5.2)
PROT SERPL-MCNC: 6.8 G/DL (ref 6–8.5)
SODIUM SERPL-SCNC: 142 MMOL/L (ref 134–144)

## 2021-11-30 NOTE — PROGRESS NOTES
Stable diabetes. Ferritin level low, advised to take Vitron C1 tablet every day for 3 months. Be on iron rich diet.

## 2022-01-12 DIAGNOSIS — E78.00 PURE HYPERCHOLESTEROLEMIA: ICD-10-CM

## 2022-01-12 RX ORDER — PRAVASTATIN SODIUM 20 MG/1
TABLET ORAL
Qty: 90 TABLET | Refills: 1 | Status: SHIPPED | OUTPATIENT
Start: 2022-01-12

## 2022-01-13 DIAGNOSIS — E11.9 TYPE 2 DIABETES MELLITUS WITHOUT COMPLICATION, WITHOUT LONG-TERM CURRENT USE OF INSULIN (HCC): ICD-10-CM

## 2022-01-13 RX ORDER — INSULIN PUMP SYRINGE, 3 ML
EACH MISCELLANEOUS
Qty: 1 KIT | Refills: 0 | Status: SHIPPED | OUTPATIENT
Start: 2022-01-13

## 2022-01-19 RX ORDER — LANOLIN ALCOHOL/MO/W.PET/CERES
CREAM (GRAM) TOPICAL
Qty: 60 TABLET | Refills: 1 | Status: SHIPPED | OUTPATIENT
Start: 2022-01-19 | End: 2022-02-14

## 2022-01-24 DIAGNOSIS — E11.49 OTHER DIABETIC NEUROLOGICAL COMPLICATION ASSOCIATED WITH TYPE 2 DIABETES MELLITUS (HCC): ICD-10-CM

## 2022-01-24 DIAGNOSIS — M47.22 OSTEOARTHRITIS OF SPINE WITH RADICULOPATHY, CERVICAL REGION: ICD-10-CM

## 2022-01-24 RX ORDER — GABAPENTIN 100 MG/1
CAPSULE ORAL
Qty: 60 CAPSULE | Refills: 1 | Status: SHIPPED | OUTPATIENT
Start: 2022-01-24

## 2022-01-24 RX ORDER — MELOXICAM 15 MG/1
TABLET ORAL
Qty: 90 TABLET | Refills: 1 | Status: SHIPPED | OUTPATIENT
Start: 2022-01-24

## 2022-01-30 DIAGNOSIS — N40.1 HYPERPLASIA OF PROSTATE WITH URINARY RETENTION: ICD-10-CM

## 2022-01-30 DIAGNOSIS — R33.8 HYPERPLASIA OF PROSTATE WITH URINARY RETENTION: ICD-10-CM

## 2022-01-31 RX ORDER — TAMSULOSIN HYDROCHLORIDE 0.4 MG/1
CAPSULE ORAL
Qty: 90 CAPSULE | Refills: 1 | Status: SHIPPED | OUTPATIENT
Start: 2022-01-31

## 2022-02-01 DIAGNOSIS — M15.9 PRIMARY OSTEOARTHRITIS INVOLVING MULTIPLE JOINTS: ICD-10-CM

## 2022-02-01 RX ORDER — DICLOFENAC SODIUM 10 MG/G
GEL TOPICAL
Qty: 300 G | Refills: 3 | Status: SHIPPED | OUTPATIENT
Start: 2022-02-01

## 2022-02-08 DIAGNOSIS — E11.40 TYPE 2 DIABETES MELLITUS WITH DIABETIC NEUROPATHY, WITHOUT LONG-TERM CURRENT USE OF INSULIN (HCC): ICD-10-CM

## 2022-02-08 RX ORDER — LINAGLIPTIN AND METFORMIN HYDROCHLORIDE 2.5; 1 MG/1; MG/1
TABLET, FILM COATED ORAL
Qty: 180 TABLET | Refills: 1 | Status: SHIPPED | OUTPATIENT
Start: 2022-02-08

## 2022-02-14 RX ORDER — LANOLIN ALCOHOL/MO/W.PET/CERES
CREAM (GRAM) TOPICAL
Qty: 60 TABLET | Refills: 1 | Status: SHIPPED | OUTPATIENT
Start: 2022-02-14 | End: 2022-03-08

## 2022-02-23 DIAGNOSIS — E16.2 LOW BLOOD SUGAR: ICD-10-CM

## 2022-02-24 RX ORDER — GLIPIZIDE 2.5 MG/1
2.5 TABLET, EXTENDED RELEASE ORAL DAILY
Qty: 90 TABLET | Refills: 1 | Status: SHIPPED | OUTPATIENT
Start: 2022-02-24

## 2022-03-02 RX ORDER — FAMOTIDINE 20 MG/1
TABLET, FILM COATED ORAL
Qty: 180 TABLET | Refills: 1 | Status: SHIPPED | OUTPATIENT
Start: 2022-03-02

## 2022-03-03 RX ORDER — GLUCOSAMINE SULFATE 1500 MG
POWDER IN PACKET (EA) ORAL
Qty: 90 CAPSULE | Refills: 2 | Status: SHIPPED | OUTPATIENT
Start: 2022-03-03

## 2022-03-08 RX ORDER — LANOLIN ALCOHOL/MO/W.PET/CERES
CREAM (GRAM) TOPICAL
Qty: 60 TABLET | Refills: 1 | Status: SHIPPED | OUTPATIENT
Start: 2022-03-08 | End: 2022-03-23 | Stop reason: SDUPTHER

## 2022-03-19 PROBLEM — E11.40 TYPE 2 DIABETES MELLITUS WITH DIABETIC NEUROPATHY (HCC): Status: ACTIVE | Noted: 2018-06-04

## 2022-03-20 PROBLEM — E11.22 TYPE 2 DIABETES MELLITUS WITH CHRONIC KIDNEY DISEASE (HCC): Status: ACTIVE | Noted: 2021-11-18

## 2022-03-23 DIAGNOSIS — E61.1 IRON DEFICIENCY: Primary | ICD-10-CM

## 2022-03-24 RX ORDER — LANOLIN ALCOHOL/MO/W.PET/CERES
325 CREAM (GRAM) TOPICAL 2 TIMES DAILY WITH MEALS
Qty: 90 TABLET | Refills: 1 | Status: SHIPPED | OUTPATIENT
Start: 2022-03-24 | End: 2022-03-25

## 2022-03-25 RX ORDER — FERROUS SULFATE 325(65) MG
TABLET, DELAYED RELEASE (ENTERIC COATED) ORAL
Qty: 30 TABLET | Refills: 2 | Status: SHIPPED | OUTPATIENT
Start: 2022-03-25

## 2022-05-24 NOTE — TELEPHONE ENCOUNTER
Called and spoke with pts daughter and reviewed lab results and recommendations per provider. No s/s of bleeding noted at this time, She will f/u if that changes.
Returning call that was left by [facility]
The patient is a 44y Male complaining of throat, pain.

## 2022-06-02 NOTE — TELEPHONE ENCOUNTER
----- Message from Ayad Bustillos sent at 2/14/2019  5:12 PM EST -----  Regarding: Dr. Deepika Tirado calling practice to inform doctor that Medicare reached out to the pharmacy stated that the form from the practice was not fully complete for pts \"Janumet\"   Insurance is requesting form to be completed. Insurance requestiong doctor to call the PA department with medicare part d. This is for a prior authorization.    Medicare contact [phone]    [facility] contact:[phone]
Call placed to insurance and PA initiated on nathanielt XR case # [id number] with 72 hrs determination
Patients daughter is asking to re submit the prior auth for Janumet XR- insurance should have faxed over the info they need today.  Lisa is asking for the information to be completed today so that her father can get his med  If the fax does not come through please contact Lisa  [facility] [phone] pharmacist
[phone]

## 2023-05-09 RX ORDER — LANCETS 30 GAUGE
EACH MISCELLANEOUS
COMMUNITY
Start: 2020-04-29

## 2023-08-15 NOTE — PROGRESS NOTES
Requested Prescriptions     Signed Prescriptions Disp Refills    SITagliptin-metFORMIN (JANUMET) 50-1,000 mg per tablet 60 Tab 5     Sig: Take 1 Tab by mouth two (2) times daily (with meals). D/C Janumet XR    PerDr. Dereck Homans, verbal order received.
0

## 2024-11-08 NOTE — MR AVS SNAPSHOT
-Keep your wound dressing clean, dry, and intact. Only change dressing if it's over saturated, soiled or falls off.     -Should you experience any significant changes in your wound(s), such as infection (redness, swelling, localized heat, increased pain, fever > 101 F, chills) or have any questions regarding your home care instructions, please contact the wound center at [phone]. If after hours, contact your primary care physician or go to the hospital emergency room.     Visit Information Date & Time Provider Department Dept. Phone Encounter #  
 4/12/2017 11:15 AM Raudel Irby, [facility] Internal [facility] [phone] [id number] Your Appointments 6/12/2017  9:45 AM  
ROUTINE CARE with Eyad Barrientos MD  
[facility] Internal Medicine [facility]) Appt Note: 4 month follow up 15[facility] [address] [id number]  
[phone]  
  
   
 [address] Upcoming Health Maintenance Date Due DTaP/Tdap/Td series (1 - Tdap) 1/7/1961 ZOSTER VACCINE AGE 60> 1/7/1999 Pneumococcal 65+ Low/Medium Risk (2 of 2 - PCV13) 10/29/2015 EYE EXAM RETINAL OR DILATED Q1 2/14/2016 GLAUCOMA SCREENING Q2Y 2/14/2017 MEDICARE YEARLY EXAM 5/3/2017 HEMOGLOBIN A1C Q6M 8/13/2017 MICROALBUMIN Q1 10/10/2017 LIPID PANEL Q1 10/10/2017 FOOT EXAM Q1 1/18/2018 Allergies as of 4/12/2017  Review Complete On: 4/12/2017 By: Raudel Irby NP Severity Noted Reaction Type Reactions Amoxicillin  02/23/2012   Systemic Nausea and Vomiting Hydrocod-cpm-pe-acetaminophen  03/19/2010    Other (comments) GI Upset Seafood [Shellfish Containing Products]  04/20/2012   Topical Rash Current Immunizations  Reviewed on 11/20/2014 Name Date Influenza Vaccine 11/18/2014,  Incomplete Influenza Vaccine Split 11/2/2012, 9/27/2011, 10/14/2010 Pneumococcal Polysaccharide (PPSV-23) 10/29/2014 Not reviewed this visit You Were Diagnosed With   
  
 Codes Comments Hematoma    -  Primary ICD-10-CM: T14.8 ICD-9-CM: 924.9 Vitals BP Pulse Temp Resp Height(growth percentile) Weight(growth percentile) 154/75 89 97.9 °F (36.6 °C) (Oral) 20 5' 4\" (1.626 m) 148 lb (67.1 kg) SpO2 BMI Smoking Status 96% 25.4 kg/m2 Former Smoker BMI and BSA Data Body Mass Index Body Surface Area  
 25.4 kg/m 2 1.74 m 2 Preferred Pharmacy Pharmacy Name Phone [id number] Ravindra [address] [place] [address] [phone] Your Updated Medication List  
  
   
This list is accurate as of: 4/12/17 11:41 AM.  Always use your most recent med list.  
  
  
  
  
 acetaminophen 500 mg tablet Commonly known as:  TYLENOL Take  by mouth every six (6) hours as needed. aspirin 81 mg chewable tablet Take 1 Tab by mouth every other day. clobetasol 0.05 % ointment Commonly known as:  Lestine Shoemaker Apply  to affected area two (2) times a day. fluticasone 50 mcg/actuation nasal spray Commonly known as:  Carilyn Mckusick INHALE 1 SPRAY EACH NOSTRIL AT BEDTIME  
  
 glipiZIDE SR 2.5 mg CR tablet Commonly known as:  GLUCOTROL XL  
TAKE 1 TABLET BY MOUTH EVERY DAY  
  
 hydrocortisone 2.5 % lotion Commonly known as:  HYTONE  
APPLY A THIN LAYER TO AFFECTED AREA TWICE DAILY Iron 325 mg (65 mg iron) tablet Generic drug:  ferrous sulfate TAKE 1 TABLET BY MOUTH DAILY (BEFORE BREAKFAST). lisinopril 10 mg tablet Commonly known as:  PRINIVIL, ZESTRIL  
TAKE 1 TABLET BY MOUTH DAILY DISCONTINUE HCTZ  
  
 meloxicam 7.5 mg tablet Commonly known as:  MOBIC Take 1 Tab by mouth daily. OTHER  
500 mg. [facility] pain relief  
  
 senna-docusate 8.6-50 mg per tablet Commonly known as:  SENNA PLUS Take 1 Tab by mouth daily. simvastatin 10 mg tablet Commonly known as:  ZOCOR  
TAKE 1 TABLET BY MOUTH AT BEDTIME  
  
 * JANUMET XR 50-1,000 mg Tm24 Generic drug:  SITagliptin-metFORMIN  
TAKE 2 TABLETS BY MOUTH EVERY DAY  
  
 * SITagliptin-metFORMIN 50-1,000 mg Tm24 Commonly known as:  JANUMET XR  
TAKE 2 TABLETS BY MOUTH EVERY DAY  
  
 SYSTANE (PROPYLENE GLYCOL) 0.4-0.3 % Drop Generic drug:  peg 400-propylene glycol Administer 1 Drop to left eye as needed. tamsulosin 0.4 mg capsule Commonly known as:  FLOMAX TAKE ONE CAPSULE BY MOUTH EVERY DAY  
  
 traMADol 50 mg tablet Commonly known as:  ULTRAM  
TAKE 1 TABLET BY MOUTH EVERY DAY AS NEEDED FOR PAIN  
  
 triamcinolone 55 mcg nasal inhaler Commonly known as:  NASACORT AQ  
1 [place] by Both Nostrils route nightly. VITAMIN D3 1,000 unit Cap Generic drug:  cholecalciferol TAKE ONE CAPSULE BY MOUTH EVERY DAY * VOLTAREN 1 % Gel Generic drug:  diclofenac APPLY 2 G TO AFFECTED AREA TWO (2) TIMES A DAY. * diclofenac 1 % Gel Commonly known as:  VOLTAREN  
APPLY 2 GRAMS TO AFFECTED AREA TWO (2) TIMES A DAY. * Notice: This list has 4 medication(s) that are the same as other medications prescribed for you. Read the directions carefully, and ask your doctor or other care provider to review them with you. We Performed the Following CBC WITH AUTOMATED DIFF [88508 CPT(R)] METABOLIC PANEL, COMPREHENSIVE [31935 CPT(R)] PROTHROMBIN TIME + INR [86817 CPT(R)] TSH 3RD GENERATION [72849 CPT(R)] Patient Instructions Hematoma: Care Instructions Your Care Instructions A hematoma is a bad bruise. It happens when an injury causes blood to collect and pool under the skin. The pooling blood gives the skin a spongy, rubbery, lumpy feel. A hematoma usually is not a cause for concern. It is not the same thing as a blood clot in a vein, and it does not cause blood clots. Follow-up care is a key part of your treatment and safety. Be sure to make and go to all appointments, and call your doctor if you are having problems. It's also a good idea to know your test results and keep a list of the medicines you take. How can you care for yourself at home? · Rest and protect the bruised area. · Put ice or a cold pack on the area for 10 to 20 minutes at a time. · Prop up the bruised area on a pillow when you ice it or anytime you sit or lie down during the next 3 days. Try to keep it above the level of your heart. This will help reduce swelling. · Wrapping the bruised area with an elastic bandage such as an Ace wrap will help decrease swelling. Don't wrap it too tightly, as this can cause more swelling below the affected area. · Take an over-the-counter pain medicine, such as acetaminophen (Tylenol), ibuprofen (Advil, Motrin), or naproxen (Aleve). · Do not take two or more pain medicines at the same time unless the doctor told you to. Many pain medicines have acetaminophen, which is Tylenol. Too much acetaminophen (Tylenol) can be harmful. When should you call for help? Call your doctor now or seek immediate medical care if: 
· You have signs of skin infection, such as: 
¨ Increased pain, swelling, warmth, or redness. ¨ Red streaks leading from the area. ¨ Pus draining from the area. ¨ A fever. Watch closely for changes in your health, and be sure to contact your doctor if: · The bruise lasts longer than 4 weeks. · The bruise gets bigger or becomes more painful. · You do not get better as expected. Where can you learn more? Go to http://aliyah-leigh.info/. Enter P911 in the search box to learn more about \"Hematoma: Care Instructions. \" Current as of: May 27, 2016 Content Version: 11.2 © 4536-2707 [employer]. Care instructions adapted under license by [employer] (which disclaims liability or warranty for this information). If you have questions about a medical condition or this instruction, always ask your healthcare professional. [place] [address] any warranty or liability for your use of this information. Introducing [facility] & HEALTH SERVICES! Dear [employer] [place]: 
Thank you for requesting a [employer] account. Our records indicate that you have previously registered for a [employer] account but its currently inactive. Please call our [employer] support line at [phone]. Additional Information If you have questions, please visit the Frequently Asked Questions section of the [employer] website at [email or web address] Remember, [employer] is NOT to be used for urgent needs. For medical emergencies, dial 911. Now available from your iPhone and Android! Please provide this summary of care documentation to your next provider. Your primary care clinician is listed as Melodie Walden. If you have any questions after today's visit, please call [phone].